# Patient Record
Sex: FEMALE | Race: WHITE | NOT HISPANIC OR LATINO
[De-identification: names, ages, dates, MRNs, and addresses within clinical notes are randomized per-mention and may not be internally consistent; named-entity substitution may affect disease eponyms.]

---

## 2017-09-21 ENCOUNTER — APPOINTMENT (OUTPATIENT)
Dept: ORTHOPEDIC SURGERY | Facility: CLINIC | Age: 67
End: 2017-09-21
Payer: COMMERCIAL

## 2017-09-21 VITALS
BODY MASS INDEX: 28.7 KG/M2 | WEIGHT: 152 LBS | HEART RATE: 78 BPM | SYSTOLIC BLOOD PRESSURE: 145 MMHG | DIASTOLIC BLOOD PRESSURE: 80 MMHG | HEIGHT: 61 IN

## 2017-09-21 VITALS — BODY MASS INDEX: 28.7 KG/M2 | HEIGHT: 61 IN | WEIGHT: 152 LBS

## 2017-09-21 DIAGNOSIS — Z78.9 OTHER SPECIFIED HEALTH STATUS: ICD-10-CM

## 2017-09-21 DIAGNOSIS — Z80.9 FAMILY HISTORY OF MALIGNANT NEOPLASM, UNSPECIFIED: ICD-10-CM

## 2017-09-21 DIAGNOSIS — Z82.61 FAMILY HISTORY OF ARTHRITIS: ICD-10-CM

## 2017-09-21 DIAGNOSIS — Z87.39 PERSONAL HISTORY OF OTHER DISEASES OF THE MUSCULOSKELETAL SYSTEM AND CONNECTIVE TISSUE: ICD-10-CM

## 2017-09-21 PROCEDURE — 73562 X-RAY EXAM OF KNEE 3: CPT | Mod: RT

## 2017-09-21 PROCEDURE — 99205 OFFICE O/P NEW HI 60 MIN: CPT

## 2017-09-21 PROCEDURE — 72170 X-RAY EXAM OF PELVIS: CPT

## 2017-10-05 ENCOUNTER — RX RENEWAL (OUTPATIENT)
Age: 67
End: 2017-10-05

## 2017-10-12 ENCOUNTER — APPOINTMENT (OUTPATIENT)
Dept: VASCULAR SURGERY | Facility: CLINIC | Age: 67
End: 2017-10-12
Payer: COMMERCIAL

## 2017-10-12 VITALS
DIASTOLIC BLOOD PRESSURE: 66 MMHG | HEIGHT: 61 IN | HEART RATE: 63 BPM | TEMPERATURE: 97.7 F | SYSTOLIC BLOOD PRESSURE: 112 MMHG | BODY MASS INDEX: 28.7 KG/M2 | WEIGHT: 152 LBS

## 2017-10-12 PROCEDURE — 99203 OFFICE O/P NEW LOW 30 MIN: CPT | Mod: 25

## 2017-10-12 PROCEDURE — 93970 EXTREMITY STUDY: CPT

## 2017-10-27 ENCOUNTER — APPOINTMENT (OUTPATIENT)
Dept: VASCULAR SURGERY | Facility: CLINIC | Age: 67
End: 2017-10-27

## 2017-11-03 ENCOUNTER — RX RENEWAL (OUTPATIENT)
Age: 67
End: 2017-11-03

## 2017-11-21 ENCOUNTER — OUTPATIENT (OUTPATIENT)
Dept: OUTPATIENT SERVICES | Facility: HOSPITAL | Age: 67
LOS: 1 days | End: 2017-11-21
Payer: COMMERCIAL

## 2017-11-21 VITALS
WEIGHT: 153 LBS | OXYGEN SATURATION: 97 % | RESPIRATION RATE: 16 BRPM | HEART RATE: 76 BPM | SYSTOLIC BLOOD PRESSURE: 102 MMHG | HEIGHT: 60.5 IN | TEMPERATURE: 97 F | DIASTOLIC BLOOD PRESSURE: 62 MMHG

## 2017-11-21 DIAGNOSIS — E03.9 HYPOTHYROIDISM, UNSPECIFIED: ICD-10-CM

## 2017-11-21 DIAGNOSIS — M34.1 CR(E)ST SYNDROME: ICD-10-CM

## 2017-11-21 DIAGNOSIS — Z98.890 OTHER SPECIFIED POSTPROCEDURAL STATES: Chronic | ICD-10-CM

## 2017-11-21 DIAGNOSIS — M17.11 UNILATERAL PRIMARY OSTEOARTHRITIS, RIGHT KNEE: ICD-10-CM

## 2017-11-21 DIAGNOSIS — I83.90 ASYMPTOMATIC VARICOSE VEINS OF UNSPECIFIED LOWER EXTREMITY: ICD-10-CM

## 2017-11-21 DIAGNOSIS — Z92.3 PERSONAL HISTORY OF IRRADIATION: Chronic | ICD-10-CM

## 2017-11-21 LAB
ALBUMIN SERPL ELPH-MCNC: 4.1 G/DL — SIGNIFICANT CHANGE UP (ref 3.3–5)
ALP SERPL-CCNC: 61 U/L — SIGNIFICANT CHANGE UP (ref 40–120)
ALT FLD-CCNC: 12 U/L — SIGNIFICANT CHANGE UP (ref 4–33)
APPEARANCE UR: CLEAR — SIGNIFICANT CHANGE UP
AST SERPL-CCNC: 14 U/L — SIGNIFICANT CHANGE UP (ref 4–32)
BACTERIA # UR AUTO: SIGNIFICANT CHANGE UP
BILIRUB SERPL-MCNC: 0.2 MG/DL — SIGNIFICANT CHANGE UP (ref 0.2–1.2)
BILIRUB UR-MCNC: NEGATIVE — SIGNIFICANT CHANGE UP
BLD GP AB SCN SERPL QL: NEGATIVE — SIGNIFICANT CHANGE UP
BLOOD UR QL VISUAL: NEGATIVE — SIGNIFICANT CHANGE UP
BUN SERPL-MCNC: 21 MG/DL — SIGNIFICANT CHANGE UP (ref 7–23)
CALCIUM SERPL-MCNC: 9.6 MG/DL — SIGNIFICANT CHANGE UP (ref 8.4–10.5)
CHLORIDE SERPL-SCNC: 108 MMOL/L — HIGH (ref 98–107)
CO2 SERPL-SCNC: 23 MMOL/L — SIGNIFICANT CHANGE UP (ref 22–31)
COLOR SPEC: YELLOW — SIGNIFICANT CHANGE UP
CREAT SERPL-MCNC: 0.8 MG/DL — SIGNIFICANT CHANGE UP (ref 0.5–1.3)
GLUCOSE SERPL-MCNC: 72 MG/DL — SIGNIFICANT CHANGE UP (ref 70–99)
GLUCOSE UR-MCNC: NEGATIVE — SIGNIFICANT CHANGE UP
GRAN CASTS # UR COMP ASSIST: SIGNIFICANT CHANGE UP
HBA1C BLD-MCNC: 5.7 % — HIGH (ref 4–5.6)
HCT VFR BLD CALC: 42.4 % — SIGNIFICANT CHANGE UP (ref 34.5–45)
HGB BLD-MCNC: 13 G/DL — SIGNIFICANT CHANGE UP (ref 11.5–15.5)
HYALINE CASTS # UR AUTO: SIGNIFICANT CHANGE UP (ref 0–?)
KETONES UR-MCNC: NEGATIVE — SIGNIFICANT CHANGE UP
LEUKOCYTE ESTERASE UR-ACNC: NEGATIVE — SIGNIFICANT CHANGE UP
MCHC RBC-ENTMCNC: 27.6 PG — SIGNIFICANT CHANGE UP (ref 27–34)
MCHC RBC-ENTMCNC: 30.7 % — LOW (ref 32–36)
MCV RBC AUTO: 90 FL — SIGNIFICANT CHANGE UP (ref 80–100)
MUCOUS THREADS # UR AUTO: SIGNIFICANT CHANGE UP
NITRITE UR-MCNC: NEGATIVE — SIGNIFICANT CHANGE UP
NRBC # FLD: 0 — SIGNIFICANT CHANGE UP
PH UR: 6 — SIGNIFICANT CHANGE UP (ref 4.6–8)
PLATELET # BLD AUTO: 246 K/UL — SIGNIFICANT CHANGE UP (ref 150–400)
PMV BLD: 12.1 FL — SIGNIFICANT CHANGE UP (ref 7–13)
POTASSIUM SERPL-MCNC: 4.1 MMOL/L — SIGNIFICANT CHANGE UP (ref 3.5–5.3)
POTASSIUM SERPL-SCNC: 4.1 MMOL/L — SIGNIFICANT CHANGE UP (ref 3.5–5.3)
PROT SERPL-MCNC: 7 G/DL — SIGNIFICANT CHANGE UP (ref 6–8.3)
PROT UR-MCNC: 20 — SIGNIFICANT CHANGE UP
RBC # BLD: 4.71 M/UL — SIGNIFICANT CHANGE UP (ref 3.8–5.2)
RBC # FLD: 13.4 % — SIGNIFICANT CHANGE UP (ref 10.3–14.5)
RBC CASTS # UR COMP ASSIST: SIGNIFICANT CHANGE UP (ref 0–?)
RH IG SCN BLD-IMP: POSITIVE — SIGNIFICANT CHANGE UP
SODIUM SERPL-SCNC: 145 MMOL/L — SIGNIFICANT CHANGE UP (ref 135–145)
SP GR SPEC: 1.02 — SIGNIFICANT CHANGE UP (ref 1–1.03)
SQUAMOUS # UR AUTO: SIGNIFICANT CHANGE UP
UROBILINOGEN FLD QL: NORMAL E.U. — SIGNIFICANT CHANGE UP (ref 0.1–0.2)
WBC # BLD: 7.66 K/UL — SIGNIFICANT CHANGE UP (ref 3.8–10.5)
WBC # FLD AUTO: 7.66 K/UL — SIGNIFICANT CHANGE UP (ref 3.8–10.5)
WBC UR QL: HIGH (ref 0–?)

## 2017-11-21 PROCEDURE — 93010 ELECTROCARDIOGRAM REPORT: CPT

## 2017-11-21 RX ORDER — SODIUM CHLORIDE 9 MG/ML
3 INJECTION INTRAMUSCULAR; INTRAVENOUS; SUBCUTANEOUS EVERY 8 HOURS
Qty: 0 | Refills: 0 | Status: DISCONTINUED | OUTPATIENT
Start: 2017-12-04 | End: 2017-12-06

## 2017-11-21 RX ORDER — TRAMADOL HYDROCHLORIDE 50 MG/1
50 TABLET ORAL ONCE
Qty: 0 | Refills: 0 | Status: DISCONTINUED | OUTPATIENT
Start: 2017-12-04 | End: 2017-12-04

## 2017-11-21 RX ORDER — SODIUM CHLORIDE 9 MG/ML
1000 INJECTION, SOLUTION INTRAVENOUS
Qty: 0 | Refills: 0 | Status: DISCONTINUED | OUTPATIENT
Start: 2017-12-04 | End: 2017-12-04

## 2017-11-21 RX ORDER — PANTOPRAZOLE SODIUM 20 MG/1
40 TABLET, DELAYED RELEASE ORAL ONCE
Qty: 0 | Refills: 0 | Status: COMPLETED | OUTPATIENT
Start: 2017-12-04 | End: 2017-12-04

## 2017-11-21 RX ORDER — ACETAMINOPHEN 500 MG
975 TABLET ORAL ONCE
Qty: 0 | Refills: 0 | Status: COMPLETED | OUTPATIENT
Start: 2017-12-04 | End: 2017-12-04

## 2017-11-21 RX ORDER — GABAPENTIN 400 MG/1
300 CAPSULE ORAL ONCE
Qty: 0 | Refills: 0 | Status: COMPLETED | OUTPATIENT
Start: 2017-12-04 | End: 2017-12-04

## 2017-11-21 NOTE — H&P PST ADULT - PROBLEM SELECTOR PLAN 3
Pt states she received clearance from her Rheumatologist Dr. Goldberg secondary to CREST syndrome   Copy of clearance requested  Pt also saw a Pulmonologist preop. States CXR and PFT's were performed  Copy of clearance and all reports requested

## 2017-11-21 NOTE — H&P PST ADULT - NEGATIVE ENMT SYMPTOMS
no gum bleeding/no hearing difficulty/no ear pain/no tinnitus/no abnormal taste sensation/no vertigo/no dysphagia/no nose bleeds

## 2017-11-21 NOTE — H&P PST ADULT - GENERAL GENITOURINARY SYMPTOMS
recently treated for bladder infection earlier this month/bladder infections flank pain L/recently treated for bladder infection earlier this month/bladder infections

## 2017-11-21 NOTE — H&P PST ADULT - OTHER CARE PROVIDERS
Pulmonary Dr. Clayton [phone] Rheumatology Dr. Goldberg [phone] Pulmonary Dr. Clayton [phone] Rheumatology Dr. Goldberg [phone] Vascular Dr. Briggs [phone]

## 2017-11-21 NOTE — H&P PST ADULT - NEGATIVE CARDIOVASCULAR SYMPTOMS
no orthopnea/no paroxysmal nocturnal dyspnea/no chest pain/no palpitations/no dyspnea on exertion/no claudication

## 2017-11-21 NOTE — H&P PST ADULT - NEUROLOGICAL DETAILS
alert and oriented x 3/responds to verbal commands/normal strength/responds to pain/sensation intact/no spontaneous movement

## 2017-11-21 NOTE — H&P PST ADULT - PSH
H/O lumpectomy  right breast in 2005 H/O lumpectomy  of right breast in 2005 with removal of 2 lymph nodes  S/P radiation therapy  for 9 weeks

## 2017-11-21 NOTE — H&P PST ADULT - NEGATIVE NEUROLOGICAL SYMPTOMS
no weakness/no generalized seizures/no focal seizures/no syncope/no paresthesias/no loss of consciousness/no hemiparesis/no headache/no tremors/no facial palsy/no loss of sensation/no transient paralysis/no confusion

## 2017-11-21 NOTE — H&P PST ADULT - PROBLEM SELECTOR PLAN 4
Pt states she was instructed to see a vascular surgeon secondary to her h/o varicose veins  Seen by Dr. Briggs for clearance   Copy of Note requested

## 2017-11-21 NOTE — H&P PST ADULT - NEGATIVE GENERAL SYMPTOMS
no weight loss/no chills/no polyuria/no fatigue/no weight gain/no polydipsia/no fever/no sweating/no polyphagia

## 2017-11-21 NOTE — H&P PST ADULT - PROBLEM SELECTOR PLAN 1
Scheduled for Right Total Knee Replacement on 12/4/2017.   Pre op instructions given , pt verbalized understanding   Chlorhexidine wash provided  Pt instructed to take her prescribed GI prophylaxis Omeprazole AM of surgery with a sip of water   She has an appt on 11/27 for medical evaluation  Evaluation note requested from PCP Dr. Amico  ECHO report requested

## 2017-11-21 NOTE — H&P PST ADULT - PMH
Bladder infection, chronic    Breast cancer  right breast CA in 2005  Chronic sinusitis    CREST (calcinosis, Raynaud's phenomenon, esophageal dysfunction, sclerodactyly, telangiectasia)    Hypothyroid    Lymphedema  right arm  Primary osteoarthritis of right knee    Scleroderma    Varicose veins Bladder infection, chronic    Breast cancer  right breast CA in 2005  Chronic sinusitis    CREST (calcinosis, Raynaud's phenomenon, esophageal dysfunction, sclerodactyly, telangiectasia)  affecting lungs, bladder, kidney's, esophagus, skin, eyes and ears  Hypothyroid    Lymphedema  right arm  Primary osteoarthritis of right knee    Scleroderma    Varicose veins

## 2017-11-21 NOTE — H&P PST ADULT - NEGATIVE GENERAL GENITOURINARY SYMPTOMS
no hematuria/no dysuria/no flank pain L/no incontinence/no flank pain R no incontinence/no dysuria/no hematuria/no flank pain R

## 2017-11-21 NOTE — H&P PST ADULT - HISTORY OF PRESENT ILLNESS
66 y/o female presents to PST for preoperative evaluation with dx of unilateral primary osteoarthritis right knee. Pt reports right knee pain started in December 2016. c/o chronic sharp throbbing pain that is made worse with activity limiting her ability to perform some ADLs. Scheduled for Right Total Knee Replacement on 12/4/2017. 68 y/o female presents to PST for preoperative evaluation with dx of unilateral primary osteoarthritis right knee. Pt reports right knee pain started in December 2016. c/o chronic sharp throbbing pain that is made worse with activity limiting her ability to perform some ADLs. Pt also states right knee pain keeps her up at night and despite conservative measures, right knee pain persists. Scheduled for Right Total Knee Replacement on 12/4/2017. 66 y/o female presents to PST for preoperative evaluation with dx of unilateral primary osteoarthritis right knee. Pt reports right knee pain started in December 2016. c/o chronic sharp throbbing pain that is made worse with activity limiting her ability to perform some ADLs. Pt also states right knee pain keeps her up at night and despite conservative measures, pain persists. Scheduled for Right Total Knee Replacement on 12/4/2017.

## 2017-11-21 NOTE — H&P PST ADULT - NEGATIVE GASTROINTESTINAL SYMPTOMS
no nausea/no abdominal pain/no diarrhea/no vomiting/no melena/no change in bowel habits/no constipation

## 2017-11-21 NOTE — H&P PST ADULT - NSANTHOSAYNRD_GEN_A_CORE
No. ANDRAE screening performed.  STOP BANG Legend: 0-2 = LOW Risk; 3-4 = INTERMEDIATE Risk; 5-8 = HIGH Risk

## 2017-11-21 NOTE — H&P PST ADULT - FAMILY HISTORY
Grandparent  Still living? Unknown  Family history of breast cancer, Age at diagnosis: Age Unknown  Family history of pancreatic cancer, Age at diagnosis: Age Unknown     Mother  Still living? Unknown  Family history of heart attack, Age at diagnosis: Age Unknown

## 2017-11-21 NOTE — H&P PST ADULT - LYMPHATIC
anterior cervical L/supraclavicular L/posterior cervical R/supraclavicular R/anterior cervical R/posterior cervical L

## 2017-11-21 NOTE — H&P PST ADULT - NEGATIVE OPHTHALMOLOGIC SYMPTOMS
no pain L/no photophobia/no loss of vision L/no loss of vision R/no diplopia/no blurred vision L/no blurred vision R/no pain R

## 2017-11-22 LAB
SPECIMEN SOURCE: SIGNIFICANT CHANGE UP
SPECIMEN SOURCE: SIGNIFICANT CHANGE UP

## 2017-11-23 LAB
BACTERIA NPH CULT: SIGNIFICANT CHANGE UP
BACTERIA UR CULT: SIGNIFICANT CHANGE UP

## 2017-11-27 ENCOUNTER — RX RENEWAL (OUTPATIENT)
Age: 67
End: 2017-11-27

## 2017-12-01 NOTE — ASU PATIENT PROFILE, ADULT - PSH
H/O lumpectomy  of right breast in 2005 with removal of 2 lymph nodes  S/P radiation therapy  for 9 weeks

## 2017-12-01 NOTE — ASU PATIENT PROFILE, ADULT - PMH
Bladder infection, chronic    Breast cancer  right breast CA in 2005  Chronic sinusitis    CREST (calcinosis, Raynaud's phenomenon, esophageal dysfunction, sclerodactyly, telangiectasia)  affecting lungs, bladder, kidney's, esophagus, skin, eyes and ears  Hypothyroid    Lymphedema  right arm  Primary osteoarthritis of right knee    Scleroderma    Varicose veins

## 2017-12-03 ENCOUNTER — FORM ENCOUNTER (OUTPATIENT)
Age: 67
End: 2017-12-03

## 2017-12-04 ENCOUNTER — APPOINTMENT (OUTPATIENT)
Dept: ORTHOPEDIC SURGERY | Facility: HOSPITAL | Age: 67
End: 2017-12-04

## 2017-12-04 ENCOUNTER — RESULT REVIEW (OUTPATIENT)
Age: 67
End: 2017-12-04

## 2017-12-04 ENCOUNTER — INPATIENT (INPATIENT)
Facility: HOSPITAL | Age: 67
LOS: 1 days | Discharge: HOME HEALTH | End: 2017-12-06
Attending: ORTHOPAEDIC SURGERY | Admitting: ORTHOPAEDIC SURGERY
Payer: COMMERCIAL

## 2017-12-04 VITALS
RESPIRATION RATE: 16 BRPM | WEIGHT: 153 LBS | OXYGEN SATURATION: 98 % | SYSTOLIC BLOOD PRESSURE: 131 MMHG | HEART RATE: 66 BPM | HEIGHT: 60.5 IN | TEMPERATURE: 97 F | DIASTOLIC BLOOD PRESSURE: 90 MMHG

## 2017-12-04 DIAGNOSIS — M17.11 UNILATERAL PRIMARY OSTEOARTHRITIS, RIGHT KNEE: ICD-10-CM

## 2017-12-04 DIAGNOSIS — Z98.890 OTHER SPECIFIED POSTPROCEDURAL STATES: Chronic | ICD-10-CM

## 2017-12-04 DIAGNOSIS — Z92.3 PERSONAL HISTORY OF IRRADIATION: Chronic | ICD-10-CM

## 2017-12-04 LAB
BUN SERPL-MCNC: 19 MG/DL — SIGNIFICANT CHANGE UP (ref 7–23)
CALCIUM SERPL-MCNC: 9.2 MG/DL — SIGNIFICANT CHANGE UP (ref 8.4–10.5)
CHLORIDE SERPL-SCNC: 108 MMOL/L — HIGH (ref 98–107)
CO2 SERPL-SCNC: 22 MMOL/L — SIGNIFICANT CHANGE UP (ref 22–31)
CREAT SERPL-MCNC: 0.76 MG/DL — SIGNIFICANT CHANGE UP (ref 0.5–1.3)
GLUCOSE SERPL-MCNC: 136 MG/DL — HIGH (ref 70–99)
HCT VFR BLD CALC: 36.9 % — SIGNIFICANT CHANGE UP (ref 34.5–45)
HGB BLD-MCNC: 11.3 G/DL — LOW (ref 11.5–15.5)
MCHC RBC-ENTMCNC: 27.4 PG — SIGNIFICANT CHANGE UP (ref 27–34)
MCHC RBC-ENTMCNC: 30.6 % — LOW (ref 32–36)
MCV RBC AUTO: 89.6 FL — SIGNIFICANT CHANGE UP (ref 80–100)
NRBC # FLD: 0 — SIGNIFICANT CHANGE UP
PLATELET # BLD AUTO: 214 K/UL — SIGNIFICANT CHANGE UP (ref 150–400)
PMV BLD: 12.2 FL — SIGNIFICANT CHANGE UP (ref 7–13)
POTASSIUM SERPL-MCNC: 4.2 MMOL/L — SIGNIFICANT CHANGE UP (ref 3.5–5.3)
POTASSIUM SERPL-SCNC: 4.2 MMOL/L — SIGNIFICANT CHANGE UP (ref 3.5–5.3)
RBC # BLD: 4.12 M/UL — SIGNIFICANT CHANGE UP (ref 3.8–5.2)
RBC # FLD: 13.3 % — SIGNIFICANT CHANGE UP (ref 10.3–14.5)
RH IG SCN BLD-IMP: POSITIVE — SIGNIFICANT CHANGE UP
SODIUM SERPL-SCNC: 143 MMOL/L — SIGNIFICANT CHANGE UP (ref 135–145)
WBC # BLD: 7.64 K/UL — SIGNIFICANT CHANGE UP (ref 3.8–10.5)
WBC # FLD AUTO: 7.64 K/UL — SIGNIFICANT CHANGE UP (ref 3.8–10.5)

## 2017-12-04 PROCEDURE — 73560 X-RAY EXAM OF KNEE 1 OR 2: CPT | Mod: 26,RT

## 2017-12-04 PROCEDURE — 88305 TISSUE EXAM BY PATHOLOGIST: CPT | Mod: 26

## 2017-12-04 PROCEDURE — 27447 TOTAL KNEE ARTHROPLASTY: CPT | Mod: RT

## 2017-12-04 PROCEDURE — 88311 DECALCIFY TISSUE: CPT | Mod: 26

## 2017-12-04 RX ORDER — OXYCODONE HYDROCHLORIDE 5 MG/1
10 TABLET ORAL EVERY 4 HOURS
Qty: 0 | Refills: 0 | Status: DISCONTINUED | OUTPATIENT
Start: 2017-12-04 | End: 2017-12-06

## 2017-12-04 RX ORDER — LORATADINE 10 MG/1
10 TABLET ORAL DAILY
Qty: 0 | Refills: 0 | Status: DISCONTINUED | OUTPATIENT
Start: 2017-12-04 | End: 2017-12-06

## 2017-12-04 RX ORDER — SODIUM CHLORIDE 9 MG/ML
1000 INJECTION INTRAMUSCULAR; INTRAVENOUS; SUBCUTANEOUS ONCE
Qty: 0 | Refills: 0 | Status: COMPLETED | OUTPATIENT
Start: 2017-12-04 | End: 2017-12-04

## 2017-12-04 RX ORDER — FLUTICASONE PROPIONATE 50 MCG
2 SPRAY, SUSPENSION NASAL DAILY
Qty: 0 | Refills: 0 | Status: DISCONTINUED | OUTPATIENT
Start: 2017-12-04 | End: 2017-12-06

## 2017-12-04 RX ORDER — KETOROLAC TROMETHAMINE 30 MG/ML
15 SYRINGE (ML) INJECTION EVERY 6 HOURS
Qty: 0 | Refills: 0 | Status: DISCONTINUED | OUTPATIENT
Start: 2017-12-04 | End: 2017-12-04

## 2017-12-04 RX ORDER — PANTOPRAZOLE SODIUM 20 MG/1
40 TABLET, DELAYED RELEASE ORAL DAILY
Qty: 0 | Refills: 0 | Status: DISCONTINUED | OUTPATIENT
Start: 2017-12-04 | End: 2017-12-06

## 2017-12-04 RX ORDER — DOCUSATE SODIUM 100 MG
100 CAPSULE ORAL THREE TIMES A DAY
Qty: 0 | Refills: 0 | Status: DISCONTINUED | OUTPATIENT
Start: 2017-12-04 | End: 2017-12-06

## 2017-12-04 RX ORDER — METOCLOPRAMIDE HCL 10 MG
10 TABLET ORAL ONCE
Qty: 0 | Refills: 0 | Status: DISCONTINUED | OUTPATIENT
Start: 2017-12-04 | End: 2017-12-04

## 2017-12-04 RX ORDER — CEFAZOLIN SODIUM 1 G
2000 VIAL (EA) INJECTION EVERY 8 HOURS
Qty: 0 | Refills: 0 | Status: COMPLETED | OUTPATIENT
Start: 2017-12-04 | End: 2017-12-05

## 2017-12-04 RX ORDER — KETOROLAC TROMETHAMINE 30 MG/ML
15 SYRINGE (ML) INJECTION EVERY 6 HOURS
Qty: 0 | Refills: 0 | Status: DISCONTINUED | OUTPATIENT
Start: 2017-12-04 | End: 2017-12-05

## 2017-12-04 RX ORDER — ACETAMINOPHEN 500 MG
650 TABLET ORAL EVERY 6 HOURS
Qty: 0 | Refills: 0 | Status: DISCONTINUED | OUTPATIENT
Start: 2017-12-04 | End: 2017-12-06

## 2017-12-04 RX ORDER — POLYETHYLENE GLYCOL 3350 17 G/17G
17 POWDER, FOR SOLUTION ORAL DAILY
Qty: 0 | Refills: 0 | Status: DISCONTINUED | OUTPATIENT
Start: 2017-12-04 | End: 2017-12-06

## 2017-12-04 RX ORDER — SODIUM CHLORIDE 9 MG/ML
1000 INJECTION INTRAMUSCULAR; INTRAVENOUS; SUBCUTANEOUS ONCE
Qty: 0 | Refills: 0 | Status: COMPLETED | OUTPATIENT
Start: 2017-12-05 | End: 2017-12-05

## 2017-12-04 RX ORDER — CELECOXIB 200 MG/1
200 CAPSULE ORAL
Qty: 0 | Refills: 0 | Status: DISCONTINUED | OUTPATIENT
Start: 2017-12-06 | End: 2017-12-06

## 2017-12-04 RX ORDER — ONDANSETRON 8 MG/1
4 TABLET, FILM COATED ORAL EVERY 6 HOURS
Qty: 0 | Refills: 0 | Status: DISCONTINUED | OUTPATIENT
Start: 2017-12-04 | End: 2017-12-06

## 2017-12-04 RX ORDER — FENTANYL CITRATE 50 UG/ML
25 INJECTION INTRAVENOUS
Qty: 0 | Refills: 0 | Status: DISCONTINUED | OUTPATIENT
Start: 2017-12-04 | End: 2017-12-04

## 2017-12-04 RX ORDER — MORPHINE SULFATE 50 MG/1
4 CAPSULE, EXTENDED RELEASE ORAL ONCE
Qty: 0 | Refills: 0 | Status: DISCONTINUED | OUTPATIENT
Start: 2017-12-04 | End: 2017-12-04

## 2017-12-04 RX ORDER — SENNA PLUS 8.6 MG/1
2 TABLET ORAL AT BEDTIME
Qty: 0 | Refills: 0 | Status: DISCONTINUED | OUTPATIENT
Start: 2017-12-04 | End: 2017-12-06

## 2017-12-04 RX ORDER — PHENAZOPYRIDINE HCL 100 MG
100 TABLET ORAL DAILY
Qty: 0 | Refills: 0 | Status: DISCONTINUED | OUTPATIENT
Start: 2017-12-04 | End: 2017-12-06

## 2017-12-04 RX ORDER — DEXAMETHASONE 0.5 MG/5ML
10 ELIXIR ORAL ONCE
Qty: 0 | Refills: 0 | Status: COMPLETED | OUTPATIENT
Start: 2017-12-05 | End: 2017-12-05

## 2017-12-04 RX ORDER — SODIUM CHLORIDE 9 MG/ML
1000 INJECTION, SOLUTION INTRAVENOUS
Qty: 0 | Refills: 0 | Status: DISCONTINUED | OUTPATIENT
Start: 2017-12-04 | End: 2017-12-05

## 2017-12-04 RX ORDER — TRAMADOL HYDROCHLORIDE 50 MG/1
50 TABLET ORAL EVERY 8 HOURS
Qty: 0 | Refills: 0 | Status: DISCONTINUED | OUTPATIENT
Start: 2017-12-04 | End: 2017-12-04

## 2017-12-04 RX ORDER — TRAMADOL HYDROCHLORIDE 50 MG/1
50 TABLET ORAL EVERY 8 HOURS
Qty: 0 | Refills: 0 | Status: DISCONTINUED | OUTPATIENT
Start: 2017-12-04 | End: 2017-12-06

## 2017-12-04 RX ORDER — ASPIRIN/CALCIUM CARB/MAGNESIUM 324 MG
325 TABLET ORAL
Qty: 0 | Refills: 0 | Status: DISCONTINUED | OUTPATIENT
Start: 2017-12-04 | End: 2017-12-06

## 2017-12-04 RX ORDER — FENTANYL CITRATE 50 UG/ML
50 INJECTION INTRAVENOUS
Qty: 0 | Refills: 0 | Status: DISCONTINUED | OUTPATIENT
Start: 2017-12-04 | End: 2017-12-04

## 2017-12-04 RX ORDER — OXYCODONE HYDROCHLORIDE 5 MG/1
5 TABLET ORAL EVERY 4 HOURS
Qty: 0 | Refills: 0 | Status: DISCONTINUED | OUTPATIENT
Start: 2017-12-04 | End: 2017-12-06

## 2017-12-04 RX ORDER — GABAPENTIN 400 MG/1
100 CAPSULE ORAL EVERY 8 HOURS
Qty: 0 | Refills: 0 | Status: DISCONTINUED | OUTPATIENT
Start: 2017-12-04 | End: 2017-12-06

## 2017-12-04 RX ORDER — NIFEDIPINE 30 MG
30 TABLET, EXTENDED RELEASE 24 HR ORAL DAILY
Qty: 0 | Refills: 0 | Status: DISCONTINUED | OUTPATIENT
Start: 2017-12-04 | End: 2017-12-06

## 2017-12-04 RX ORDER — LEVOTHYROXINE SODIUM 125 MCG
125 TABLET ORAL DAILY
Qty: 0 | Refills: 0 | Status: DISCONTINUED | OUTPATIENT
Start: 2017-12-04 | End: 2017-12-06

## 2017-12-04 RX ORDER — MORPHINE SULFATE 50 MG/1
2 CAPSULE, EXTENDED RELEASE ORAL EVERY 4 HOURS
Qty: 0 | Refills: 0 | Status: DISCONTINUED | OUTPATIENT
Start: 2017-12-04 | End: 2017-12-06

## 2017-12-04 RX ADMIN — TRAMADOL HYDROCHLORIDE 50 MILLIGRAM(S): 50 TABLET ORAL at 06:46

## 2017-12-04 RX ADMIN — SODIUM CHLORIDE 1000 MILLILITER(S): 9 INJECTION INTRAMUSCULAR; INTRAVENOUS; SUBCUTANEOUS at 13:56

## 2017-12-04 RX ADMIN — TRAMADOL HYDROCHLORIDE 50 MILLIGRAM(S): 50 TABLET ORAL at 23:00

## 2017-12-04 RX ADMIN — PANTOPRAZOLE SODIUM 40 MILLIGRAM(S): 20 TABLET, DELAYED RELEASE ORAL at 12:37

## 2017-12-04 RX ADMIN — GABAPENTIN 100 MILLIGRAM(S): 400 CAPSULE ORAL at 21:55

## 2017-12-04 RX ADMIN — Medication 650 MILLIGRAM(S): at 12:38

## 2017-12-04 RX ADMIN — Medication 2 SPRAY(S): at 16:01

## 2017-12-04 RX ADMIN — Medication 100 MILLIGRAM(S): at 21:55

## 2017-12-04 RX ADMIN — FENTANYL CITRATE 50 MICROGRAM(S): 50 INJECTION INTRAVENOUS at 12:19

## 2017-12-04 RX ADMIN — Medication 325 MILLIGRAM(S): at 18:57

## 2017-12-04 RX ADMIN — GABAPENTIN 100 MILLIGRAM(S): 400 CAPSULE ORAL at 14:49

## 2017-12-04 RX ADMIN — Medication 975 MILLIGRAM(S): at 06:45

## 2017-12-04 RX ADMIN — FENTANYL CITRATE 50 MICROGRAM(S): 50 INJECTION INTRAVENOUS at 11:45

## 2017-12-04 RX ADMIN — Medication 100 MILLIGRAM(S): at 18:34

## 2017-12-04 RX ADMIN — OXYCODONE HYDROCHLORIDE 10 MILLIGRAM(S): 5 TABLET ORAL at 22:35

## 2017-12-04 RX ADMIN — Medication 650 MILLIGRAM(S): at 18:57

## 2017-12-04 RX ADMIN — SENNA PLUS 2 TABLET(S): 8.6 TABLET ORAL at 21:55

## 2017-12-04 RX ADMIN — LORATADINE 10 MILLIGRAM(S): 10 TABLET ORAL at 16:01

## 2017-12-04 RX ADMIN — OXYCODONE HYDROCHLORIDE 10 MILLIGRAM(S): 5 TABLET ORAL at 22:03

## 2017-12-04 RX ADMIN — SODIUM CHLORIDE 150 MILLILITER(S): 9 INJECTION, SOLUTION INTRAVENOUS at 13:57

## 2017-12-04 RX ADMIN — OXYCODONE HYDROCHLORIDE 10 MILLIGRAM(S): 5 TABLET ORAL at 15:02

## 2017-12-04 RX ADMIN — Medication 15 MILLIGRAM(S): at 18:57

## 2017-12-04 RX ADMIN — GABAPENTIN 300 MILLIGRAM(S): 400 CAPSULE ORAL at 06:46

## 2017-12-04 RX ADMIN — PANTOPRAZOLE SODIUM 40 MILLIGRAM(S): 20 TABLET, DELAYED RELEASE ORAL at 06:45

## 2017-12-04 RX ADMIN — OXYCODONE HYDROCHLORIDE 10 MILLIGRAM(S): 5 TABLET ORAL at 14:02

## 2017-12-04 RX ADMIN — SODIUM CHLORIDE 3 MILLILITER(S): 9 INJECTION INTRAMUSCULAR; INTRAVENOUS; SUBCUTANEOUS at 13:51

## 2017-12-04 RX ADMIN — SODIUM CHLORIDE 150 MILLILITER(S): 9 INJECTION, SOLUTION INTRAVENOUS at 11:39

## 2017-12-04 RX ADMIN — TRAMADOL HYDROCHLORIDE 50 MILLIGRAM(S): 50 TABLET ORAL at 21:55

## 2017-12-04 NOTE — BRIEF OPERATIVE NOTE - PROCEDURE
<<-----Click on this checkbox to enter Procedure Arthroplasty of right knee  12/04/2017    Active  RACHAEL

## 2017-12-04 NOTE — PHYSICAL THERAPY INITIAL EVALUATION ADULT - RANGE OF MOTION EXAMINATION, REHAB EVAL
no ROM deficits were identified/right knee flexion to 45 degrees and right knee extension -10 degrees/deficits as listed below

## 2017-12-04 NOTE — ASU PREOP CHECKLIST - SELECT TESTS ORDERED
Type and Screen/EKG/CXR/BMP/CBC/PT/PTT/Urinalysis/INR/Type and Cross/urine culture, nasal swab,pulmonary eval,echo,stress test

## 2017-12-05 ENCOUNTER — TRANSCRIPTION ENCOUNTER (OUTPATIENT)
Age: 67
End: 2017-12-05

## 2017-12-05 DIAGNOSIS — E03.9 HYPOTHYROIDISM, UNSPECIFIED: ICD-10-CM

## 2017-12-05 DIAGNOSIS — D50.0 IRON DEFICIENCY ANEMIA SECONDARY TO BLOOD LOSS (CHRONIC): ICD-10-CM

## 2017-12-05 DIAGNOSIS — C50.919 MALIGNANT NEOPLASM OF UNSPECIFIED SITE OF UNSPECIFIED FEMALE BREAST: ICD-10-CM

## 2017-12-05 DIAGNOSIS — Z96.651 PRESENCE OF RIGHT ARTIFICIAL KNEE JOINT: ICD-10-CM

## 2017-12-05 DIAGNOSIS — D72.829 ELEVATED WHITE BLOOD CELL COUNT, UNSPECIFIED: ICD-10-CM

## 2017-12-05 LAB
BUN SERPL-MCNC: 15 MG/DL — SIGNIFICANT CHANGE UP (ref 7–23)
CALCIUM SERPL-MCNC: 8 MG/DL — LOW (ref 8.4–10.5)
CHLORIDE SERPL-SCNC: 109 MMOL/L — HIGH (ref 98–107)
CO2 SERPL-SCNC: 21 MMOL/L — LOW (ref 22–31)
CREAT SERPL-MCNC: 0.74 MG/DL — SIGNIFICANT CHANGE UP (ref 0.5–1.3)
GLUCOSE SERPL-MCNC: 107 MG/DL — HIGH (ref 70–99)
HCT VFR BLD CALC: 28.7 % — LOW (ref 34.5–45)
HGB BLD-MCNC: 9.2 G/DL — LOW (ref 11.5–15.5)
MCHC RBC-ENTMCNC: 29 PG — SIGNIFICANT CHANGE UP (ref 27–34)
MCHC RBC-ENTMCNC: 32.1 % — SIGNIFICANT CHANGE UP (ref 32–36)
MCV RBC AUTO: 90.5 FL — SIGNIFICANT CHANGE UP (ref 80–100)
NRBC # FLD: 0 — SIGNIFICANT CHANGE UP
PLATELET # BLD AUTO: 171 K/UL — SIGNIFICANT CHANGE UP (ref 150–400)
PMV BLD: 12.4 FL — SIGNIFICANT CHANGE UP (ref 7–13)
POTASSIUM SERPL-MCNC: 4.4 MMOL/L — SIGNIFICANT CHANGE UP (ref 3.5–5.3)
POTASSIUM SERPL-SCNC: 4.4 MMOL/L — SIGNIFICANT CHANGE UP (ref 3.5–5.3)
RBC # BLD: 3.17 M/UL — LOW (ref 3.8–5.2)
RBC # FLD: 13.3 % — SIGNIFICANT CHANGE UP (ref 10.3–14.5)
SODIUM SERPL-SCNC: 139 MMOL/L — SIGNIFICANT CHANGE UP (ref 135–145)
WBC # BLD: 11.61 K/UL — HIGH (ref 3.8–10.5)
WBC # FLD AUTO: 11.61 K/UL — HIGH (ref 3.8–10.5)

## 2017-12-05 PROCEDURE — 99223 1ST HOSP IP/OBS HIGH 75: CPT

## 2017-12-05 RX ORDER — GLYCERIN ADULT
1 SUPPOSITORY, RECTAL RECTAL ONCE
Qty: 0 | Refills: 0 | Status: COMPLETED | OUTPATIENT
Start: 2017-12-05 | End: 2017-12-05

## 2017-12-05 RX ADMIN — Medication 125 MICROGRAM(S): at 06:05

## 2017-12-05 RX ADMIN — GABAPENTIN 100 MILLIGRAM(S): 400 CAPSULE ORAL at 23:15

## 2017-12-05 RX ADMIN — Medication 2 SPRAY(S): at 07:01

## 2017-12-05 RX ADMIN — Medication 100 MILLIGRAM(S): at 13:40

## 2017-12-05 RX ADMIN — Medication 15 MILLIGRAM(S): at 00:54

## 2017-12-05 RX ADMIN — Medication 325 MILLIGRAM(S): at 07:00

## 2017-12-05 RX ADMIN — Medication 30 MILLIGRAM(S): at 12:15

## 2017-12-05 RX ADMIN — GABAPENTIN 100 MILLIGRAM(S): 400 CAPSULE ORAL at 13:40

## 2017-12-05 RX ADMIN — SODIUM CHLORIDE 3 MILLILITER(S): 9 INJECTION INTRAMUSCULAR; INTRAVENOUS; SUBCUTANEOUS at 13:38

## 2017-12-05 RX ADMIN — Medication 102 MILLIGRAM(S): at 07:02

## 2017-12-05 RX ADMIN — OXYCODONE HYDROCHLORIDE 10 MILLIGRAM(S): 5 TABLET ORAL at 07:30

## 2017-12-05 RX ADMIN — Medication 325 MILLIGRAM(S): at 18:09

## 2017-12-05 RX ADMIN — Medication 15 MILLIGRAM(S): at 07:02

## 2017-12-05 RX ADMIN — Medication 100 MILLIGRAM(S): at 23:16

## 2017-12-05 RX ADMIN — SENNA PLUS 2 TABLET(S): 8.6 TABLET ORAL at 23:16

## 2017-12-05 RX ADMIN — Medication 15 MILLIGRAM(S): at 01:31

## 2017-12-05 RX ADMIN — OXYCODONE HYDROCHLORIDE 10 MILLIGRAM(S): 5 TABLET ORAL at 07:00

## 2017-12-05 RX ADMIN — GABAPENTIN 100 MILLIGRAM(S): 400 CAPSULE ORAL at 07:00

## 2017-12-05 RX ADMIN — Medication 100 MILLIGRAM(S): at 07:01

## 2017-12-05 RX ADMIN — SODIUM CHLORIDE 150 MILLILITER(S): 9 INJECTION, SOLUTION INTRAVENOUS at 12:11

## 2017-12-05 RX ADMIN — OXYCODONE HYDROCHLORIDE 10 MILLIGRAM(S): 5 TABLET ORAL at 14:22

## 2017-12-05 RX ADMIN — TRAMADOL HYDROCHLORIDE 50 MILLIGRAM(S): 50 TABLET ORAL at 23:15

## 2017-12-05 RX ADMIN — LORATADINE 10 MILLIGRAM(S): 10 TABLET ORAL at 12:13

## 2017-12-05 RX ADMIN — Medication 650 MILLIGRAM(S): at 12:12

## 2017-12-05 RX ADMIN — Medication 650 MILLIGRAM(S): at 07:00

## 2017-12-05 RX ADMIN — TRAMADOL HYDROCHLORIDE 50 MILLIGRAM(S): 50 TABLET ORAL at 07:00

## 2017-12-05 RX ADMIN — Medication 15 MILLIGRAM(S): at 06:59

## 2017-12-05 RX ADMIN — Medication 15 MILLIGRAM(S): at 12:12

## 2017-12-05 RX ADMIN — SODIUM CHLORIDE 1000 MILLILITER(S): 9 INJECTION INTRAMUSCULAR; INTRAVENOUS; SUBCUTANEOUS at 06:01

## 2017-12-05 RX ADMIN — Medication 650 MILLIGRAM(S): at 00:54

## 2017-12-05 RX ADMIN — Medication 650 MILLIGRAM(S): at 18:09

## 2017-12-05 RX ADMIN — Medication 1 SUPPOSITORY(S): at 15:23

## 2017-12-05 RX ADMIN — POLYETHYLENE GLYCOL 3350 17 GRAM(S): 17 POWDER, FOR SOLUTION ORAL at 12:13

## 2017-12-05 RX ADMIN — TRAMADOL HYDROCHLORIDE 50 MILLIGRAM(S): 50 TABLET ORAL at 07:03

## 2017-12-05 RX ADMIN — SODIUM CHLORIDE 3 MILLILITER(S): 9 INJECTION INTRAMUSCULAR; INTRAVENOUS; SUBCUTANEOUS at 21:08

## 2017-12-05 RX ADMIN — TRAMADOL HYDROCHLORIDE 50 MILLIGRAM(S): 50 TABLET ORAL at 13:40

## 2017-12-05 RX ADMIN — PANTOPRAZOLE SODIUM 40 MILLIGRAM(S): 20 TABLET, DELAYED RELEASE ORAL at 12:13

## 2017-12-05 RX ADMIN — OXYCODONE HYDROCHLORIDE 10 MILLIGRAM(S): 5 TABLET ORAL at 19:00

## 2017-12-05 RX ADMIN — OXYCODONE HYDROCHLORIDE 10 MILLIGRAM(S): 5 TABLET ORAL at 13:41

## 2017-12-05 RX ADMIN — OXYCODONE HYDROCHLORIDE 10 MILLIGRAM(S): 5 TABLET ORAL at 18:10

## 2017-12-05 RX ADMIN — Medication 100 MILLIGRAM(S): at 00:54

## 2017-12-05 RX ADMIN — Medication 650 MILLIGRAM(S): at 07:02

## 2017-12-05 RX ADMIN — Medication 650 MILLIGRAM(S): at 01:31

## 2017-12-05 RX ADMIN — Medication 650 MILLIGRAM(S): at 23:16

## 2017-12-05 NOTE — OCCUPATIONAL THERAPY INITIAL EVALUATION ADULT - PERTINENT HX OF CURRENT PROBLEM, REHAB EVAL
66 y/o female with dx of unilateral primary osteoarthritis right knee. Pt reports right knee pain started in December 2016. C/o chronic sharp throbbing pain that is made worse with activity limiting her ability to perform some ADLs. Pt also states right knee pain keeps her up at night and despite conservative measures, pain persists. Pt now s/p Right TKA.

## 2017-12-05 NOTE — DISCHARGE NOTE ADULT - NS AS DC FOLLOWUP STROKE INST
knee, exercise worksheet, dilaudid, tramadol, ecotrin, gabapentin, colace/Influenza vaccination ([facility] Pub Date: August 19, 2014)/Smoking Cessation knee, exercise worksheet, dilaudid, tramadol, ecotrin, gabapentin, colace

## 2017-12-05 NOTE — DISCHARGE NOTE ADULT - INSTRUCTIONS
may resume diet as prior to surgery You have a post op appointment with Dr. Farah on December 21, 2017 @ 8:30am in the [address] [place] [address] office. If you are unable to keep this appointment, please call the office to reschedule. Call MD if you develop a fever, or if there is redness, swelling, drainage or pain not relieved by pain medication. No heavy lifting, bending, or straining to move your bowels. Take over the counter stool softeners as needed to prevent constipation which may be caused by pain medication.

## 2017-12-05 NOTE — DISCHARGE NOTE ADULT - HOSPITAL COURSE
Patient is a 66 y/o female s/p right knee arthroplasty on 12/4/17. Patient tolerated the procedure well without any intraoperative complications. Patient states pain is controlled. Tolerated Physical Therapy well. As per Dr Farah patient is stable and ready for discharge. Patient was seen by Dr Franklin, medical attending for continued care and management and pain is controlled.     Please follow up with Dr Farah in 1-2 weeks. Call office to make an appointment. Please follow up with your PMD for continuity of care and management. Please keep aquacel dressing in place until post op day # 14. Any sutures/staples to be removed on post op day # 14.  Please take aspirin twice daily for DVT Prophylaxis. Weight bearing as tolerated. Call orthopaedics with any questions. Patient is a 68 y/o female s/p right knee arthroplasty on 12/4/17. Patient tolerated the procedure well without any intraoperative complications. Patient states pain is controlled. Tolerated Physical Therapy well. As per Dr Farah patient is stable and ready for discharge. Patient was seen by Dr Franklin, medical attending for continued care and management and pain is controlled.     Please follow up with Dr Farah in 1-2 weeks. Call office to make an appointment. Please follow up with your PMD for continuity of care and management. Please keep aquacel dressing in place until post op day # 14. Any sutures/staples to be removed on post op day # 14.  Please take aspirin twice daily for DVT Prophylaxis / blood thinner. Weight bearing as tolerated. Call orthopaedics with any questions.

## 2017-12-05 NOTE — DISCHARGE NOTE ADULT - PATIENT PORTAL LINK FT
“You can access the [facility] Patient Portal, offered by [facility], by registering with the following website: [email or web address]”

## 2017-12-05 NOTE — CONSULT NOTE ADULT - ASSESSMENT
67F with OA s/p Rt THR on 12/4 complicated by post-op anemia, leukocytosis, CREST, hypothyroid, Breast CA s/p lumpectomy/rad tx.

## 2017-12-05 NOTE — DISCHARGE NOTE ADULT - PLAN OF CARE
Pain reduction, improve ambulation and ADLs Please follow up with Dr Farah in 1-2 weeks. Call office to make an appointment. Please follow up with your PMD for continuity of care and management. Please keep aquacel dressing in place until post op day # 14. Any sutures/staples to be removed on post op day # 14.  Please take aspirin twice daily for DVT Prophylaxis / blood thinner. Weight bearing as tolerated. Call orthopaedics with any questions.

## 2017-12-05 NOTE — DISCHARGE NOTE ADULT - MEDICATION SUMMARY - MEDICATIONS TO STOP TAKING
I will STOP taking the medications listed below when I get home from the hospital:  None I will STOP taking the medications listed below when I get home from the hospital:    doxycycline  -- 1 tab(s) by mouth 2 times a day last dose 11/22/17    ibuprofen 800 mg oral tablet  -- 1 tab(s) by mouth 3 times a day  last dose 11/27/17

## 2017-12-05 NOTE — OCCUPATIONAL THERAPY INITIAL EVALUATION ADULT - GENERAL OBSERVATIONS, REHAB EVAL
Pt received sitting in chair. + Hemovac. +right LE dressing. + IV fluids. Pt able to perform left single leg raise.

## 2017-12-05 NOTE — DISCHARGE NOTE ADULT - CARE PROVIDER_API CALL
Shamir Farah), Orthopaedic Surgery  [address] [address]  Phone: [phone]  Fax: [phone]

## 2017-12-05 NOTE — DISCHARGE NOTE ADULT - CONDITIONS AT DISCHARGE
Pt. is afebrile and offers no complaints. In no acute distress. Right knee aquacel dressing: clean, dry and intact. Pt is ambulating with a walker, tolerating diet well, and voiding in adequate amounts.

## 2017-12-05 NOTE — DISCHARGE NOTE ADULT - CARE PLAN
Principal Discharge DX:	Primary osteoarthritis of right knee  Goal:	Pain reduction, improve ambulation and ADLs  Instructions for follow-up, activity and diet:	Please follow up with Dr Farah in 1-2 weeks. Call office to make an appointment. Please follow up with your PMD for continuity of care and management. Please keep aquacel dressing in place until post op day # 14. Any sutures/staples to be removed on post op day # 14.  Please take aspirin twice daily for DVT Prophylaxis / blood thinner. Weight bearing as tolerated. Call orthopaedics with any questions.

## 2017-12-05 NOTE — CONSULT NOTE ADULT - SUBJECTIVE AND OBJECTIVE BOX
Patient is a 67y old  Female who presents with a chief complaint of "Right Knee Replacement" (2017 08:40)      HPI:  68 y/o female presents to PST for preoperative evaluation with dx of unilateral primary osteoarthritis right knee. Pt reports right knee pain started in 2016. c/o chronic sharp throbbing pain that is made worse with activity limiting her ability to perform some ADLs. Pt also states right knee pain keeps her up at night and despite conservative measures, pain persists. Scheduled for Right Total Knee Replacement on 2017. (2017 08:40) Patient tolerated the procedure well.  Pain is described as throbbing, 10/10 without pain meds, 5/10 with pain medications.  Non-radiating, worse with movement, located on the surgical area.  Also complaining of constipation.      No F/C, N/V, CP, SOB, Cough, lightheadedness, dizziness, abdominal pain, diarrhea, dysuria.    Pain Symptoms if applicable:             	                         none	   mild         moderate         severe  Pain:	            5                0	    1-3	     4-6	         7-10  Location:	Surgical site  Modifying factors:	Worse with movement  Associated symptoms:	    Allergies    No Known Allergies    Intolerances        HOME MEDICATIONS: Reviewed    MEDICATIONS  (STANDING):  acetaminophen   Tablet. 650 milliGRAM(s) Oral every 6 hours  aspirin enteric coated 325 milliGRAM(s) Oral two times a day  docusate sodium 100 milliGRAM(s) Oral three times a day  fluticasone propionate 50 MICROgram(s)/spray Nasal Spray 2 Spray(s) Both Nostrils daily  gabapentin 100 milliGRAM(s) Oral every 8 hours  ketorolac   Injectable 15 milliGRAM(s) IV Push every 6 hours  lactated ringers. 1000 milliLiter(s) (150 mL/Hr) IV Continuous <Continuous>  levothyroxine 125 MICROGram(s) Oral daily  loratadine 10 milliGRAM(s) Oral daily  NIFEdipine XL 30 milliGRAM(s) Oral daily  pantoprazole    Tablet 40 milliGRAM(s) Oral daily  polyethylene glycol 3350 17 Gram(s) Oral daily  senna 2 Tablet(s) Oral at bedtime  sodium chloride 0.9% lock flush 3 milliLiter(s) IV Push every 8 hours  traMADol 50 milliGRAM(s) Oral every 8 hours    MEDICATIONS  (PRN):  acetaminophen   Tablet 650 milliGRAM(s) Oral every 6 hours PRN For Temp over 38.3 C (100.94 F)  aluminum hydroxide/magnesium hydroxide/simethicone Suspension 30 milliLiter(s) Oral four times a day PRN Indigestion  morphine  - Injectable 2 milliGRAM(s) IV Push every 4 hours PRN breakthrough pain  ondansetron Injectable 4 milliGRAM(s) IV Push every 6 hours PRN Nausea and/or Vomiting  oxyCODONE    IR 5 milliGRAM(s) Oral every 4 hours PRN mild and moderate pain  oxyCODONE    IR 10 milliGRAM(s) Oral every 4 hours PRN Severe Pain (7 - 10)  phenazopyridine 100 milliGRAM(s) Oral daily PRN urinary pain relief for chronic bladder infection      PAST MEDICAL & SURGICAL HISTORY:  Bladder infection, chronic  Chronic sinusitis  Varicose veins  Lymphedema: right arm  Hypothyroid  Primary osteoarthritis of right knee  Breast cancer: right breast CA in   Scleroderma  CREST (calcinosis, Raynaud's phenomenon, esophageal dysfunction, sclerodactyly, telangiectasia): affecting lungs, bladder, kidney&#x27;s, esophagus, skin, eyes and ears  S/P radiation therapy: for 9 weeks  H/O lumpectomy: of right breast in  with removal of 2 lymph nodes      SOCIAL HISTORY:  No tobacco/alcohol use/abuse.    FAMILY HISTORY:  Family history of pancreatic cancer (Grandparent)  Family history of heart attack (Mother): mother  age 69  Family history of breast cancer (Grandparent)      REVIEW OF SYSTEMS:    CONSTITUTIONAL: No fever, weight loss, or fatigue  EYES: No eye pain, visual disturbances, or discharge  ENMT:  No difficulty hearing, tinnitus, vertigo; No sinus or throat pain  NECK: No pain or stiffness  BREASTS: No pain, masses, or nipple discharge  RESPIRATORY: No cough, wheezing, chills or hemoptysis; No shortness of breath  CARDIOVASCULAR: No chest pain, palpitations, dizziness, or leg swelling  GASTROINTESTINAL: No abdominal or epigastric pain. No nausea, vomiting, or hematemesis; No diarrhea or constipation. No melena or hematochezia.  GENITOURINARY: No dysuria, frequency, hematuria, or incontinence  NEUROLOGICAL: No headaches, memory loss, loss of strength, numbness, or tremors  SKIN: No itching, burning, rashes, or lesions   LYMPH NODES: No enlarged glands  ENDOCRINE: No heat or cold intolerance; No hair loss  MUSCULOSKELETAL: Rt knee pain.  PSYCHIATRIC: No depression, anxiety, mood swings, or difficulty sleeping  HEME/LYMPH: No easy bruising, or bleeding gums  ALLERGY AND IMMUNOLOGIC: No hives or eczema      Vital Signs Last 24 Hrs  T(C): 36.8 (05 Dec 2017 09:27), Max: 37 (05 Dec 2017 01:27)  T(F): 98.2 (05 Dec 2017 09:27), Max: 98.6 (05 Dec 2017 01:27)  HR: 69 (05 Dec 2017 09:) (69 - 79)  BP: 132/62 (05 Dec 2017 09:) (93/71 - 132/62)  BP(mean): --  RR: 16 (05 Dec 2017 09:) (12 - 20)  SpO2: 95% (05 Dec 2017 09:) (95% - 100%)  CAPILLARY BLOOD GLUCOSE          PHYSICAL EXAM:    GENERAL: NAD, well-groomed, well-developed  HEAD:  Atraumatic, Normocephalic  EYES: EOMI, PERRLA, conjunctiva and sclera clear  ENMT: Moist mucous membranes  NECK: Supple, No JVD  RESPIRATORY: Clear to auscultation bilaterally; No rales, rhonchi, wheezing, or rubs  CARDIOVASCULAR: Regular rate and rhythm; No murmurs, rubs, or gallops  GASTROINTESTINAL: Soft, Nontender, Nondistended; Bowel sounds present  BACK: No tenderness  GENITOURINARY: Not examined  EXTREMITIES:  2+ Peripheral Pulses, No clubbing, cyanosis, or edema. Rt knee limited ROM due to pain.  Drain in place.  NERVOUS SYSTEM:  Alert & Oriented X3; Moving all 4 extremities; No gross sensory deficits  PSYCH: Calm  HEME/LYMPH: No lymphadenopathy noted  SKIN: No rashes or lesions; telangiectasias, Surgical dressing C/D/I    LABS:                        9.2    11.61 )-----------( 171      ( 05 Dec 2017 06:15 )             28.7     12-05    139  |  109<H>  |  15  ----------------------------<  107<H>  4.4   |  21<L>  |  0.74    Ca    8.0<L>      05 Dec 2017 06:15          CAPILLARY BLOOD GLUCOSE          RADIOLOGY & ADDITIONAL STUDIES:    Imaging:   < from: Xray Knee 1 or 2 Views, Right (12.04.17 @ 11:08) >  EXAM:  RAD KNEE 1 OR 2 VIEWS RIGHT        PROCEDURE DATE:  Dec  4 2017         INTERPRETATION:  CLINICAL INDICATION: baseline postoperative evaluation   status post right total knee replacement for osteoarthritis    EXAM:  Frontal crosstable lateral right knee from 2017 at 1108.    IMPRESSION:  Unconstrained right total knee prosthesis implanted.    Intact and aligned hardware and no periprosthetic fractures.    Postoperative soft tissue changes.    Surgical drain overlies operative site.    Correlate with intraoperative findings.                   RALPH GARBER M.D., ATTENDING RADIOLOGIST  This document has been electronically signed. Dec  4 2017 12:04PM    < end of copied text >    Personally Reviewed:  [x] YES               EKG:   Personally Reviewed:  [ ] YES       Care Discussed with Consultant(s)/Other Providers:  Care Discussed with Primary Team.      [] Increased delirium risk  [] Delirium and other risks can be reduced by:          -early ambulation          -minimizing "tethers" - IV, oxygen, catheters, etc          -avoiding hypnotics and sedatives          -maintaining hydration/nutrition          -avoid anticholinergics - diphenhydramine, etc          -pain control          -supportive environment

## 2017-12-05 NOTE — DISCHARGE NOTE ADULT - MEDICATION SUMMARY - MEDICATIONS TO TAKE
I will START or STAY ON the medications listed below when I get home from the hospital:    loratadine 10 mg oral tablet  -- 1 tab(s) by mouth once a day  -- Indication: For Home med     NIFEdipine 30 mg oral tablet, extended release  -- 1 tab(s) by mouth once a day  -- Indication: For Home med     docusate sodium 100 mg oral capsule  -- 1 cap(s) by mouth 3 times a day  -- Indication: For Stool softener     phenazopyridine 100 mg oral tablet  -- 1 tab(s) by mouth , As Needed  -- Indication: For Home med     Nasonex 50 mcg/inh nasal spray  -- 2 spray(s) into nose once a day  -- Indication: For Home med     omeprazole 40 mg oral delayed release capsule  -- 1 cap(s) by mouth once a day am  -- Indication: For gerd    levothyroxine 125 mcg (0.125 mg) oral capsule  -- 1 cap(s) by mouth once a day  -- Indication: For Hypothryoidism     Vitamin D3 5000 intl units oral capsule  -- 1 cap(s) by mouth once a day  -- Indication: For Supplement I will START or STAY ON the medications listed below when I get home from the hospital:    Mobic 15 mg oral tablet  -- 1 tab(s) by mouth once a day MDD:1  -- Indication: For Pain med     traMADol 50 mg oral tablet  -- 1 tab(s) by mouth every 8 hours MDD:3  -- Indication: For Pain med     aspirin 325 mg oral delayed release tablet  -- 1 tab(s) by mouth 2 times a day MDD:2  -- Indication: For Blood thinner     HYDROmorphone 2 mg oral tablet  -- 1-2  tab(s) by mouth every 4-6 hours, As needed for pain MDD:8  -- Indication: For Pain med     gabapentin 100 mg oral capsule  -- 1 cap(s) by mouth every 8 hours MDD:2  -- Indication: For Pain med     loratadine 10 mg oral tablet  -- 1 tab(s) by mouth once a day  -- Indication: For Home med     NIFEdipine 30 mg oral tablet, extended release  -- 1 tab(s) by mouth once a day  -- Indication: For Home med     docusate sodium 100 mg oral capsule  -- 1 cap(s) by mouth 3 times a day  -- Indication: For Stool softener     senna oral tablet  -- 2 tab(s) by mouth once a day (at bedtime) MDD:2  -- Indication: For Stool softener     phenazopyridine 100 mg oral tablet  -- 1 tab(s) by mouth , As Needed  -- Indication: For Home med     Nasonex 50 mcg/inh nasal spray  -- 2 spray(s) into nose once a day  -- Indication: For Home med     omeprazole 40 mg oral delayed release capsule  -- 1 cap(s) by mouth once a day am  -- Indication: For gerd    levothyroxine 125 mcg (0.125 mg) oral capsule  -- 1 cap(s) by mouth once a day  -- Indication: For Hypothryoidism     Vitamin D3 5000 intl units oral capsule  -- 1 cap(s) by mouth once a day  -- Indication: For Supplement I will START or STAY ON the medications listed below when I get home from the hospital:    Mobic 15 mg oral tablet  -- 1 tab(s) by mouth once a day MDD:1  -- Indication: For Pain med     traMADol 50 mg oral tablet  -- 1 tab(s) by mouth every 8 hours MDD:3  -- Indication: For Pain med     aspirin 325 mg oral delayed release tablet  -- 1 tab(s) by mouth 2 times a day MDD:2  -- Indication: For Blood thinner     HYDROmorphone 2 mg oral tablet  -- 1-2  tab(s) by mouth every 4-6 hours, As needed for pain MDD:8  -- Indication: For Pain med     Valium 5 mg oral tablet  -- 1 tab(s) by mouth every 12 hours x 3 days  PRN Anxiety MDD:2     -- Caution federal law prohibits the transfer of this drug to any person other  than the person for whom it was prescribed.  Do not take this drug if you are pregnant.  May cause drowsiness.  Alcohol may intensify this effect.  Use care when operating dangerous machinery.    -- Indication: For Anxiety    gabapentin 100 mg oral capsule  -- 1 cap(s) by mouth every 8 hours MDD:2  -- Indication: For Pain med     loratadine 10 mg oral tablet  -- 1 tab(s) by mouth once a day  -- Indication: For Home med     NIFEdipine 30 mg oral tablet, extended release  -- 1 tab(s) by mouth once a day  -- Indication: For Home med     docusate sodium 100 mg oral capsule  -- 1 cap(s) by mouth 3 times a day  -- Indication: For Stool softener     senna oral tablet  -- 2 tab(s) by mouth once a day (at bedtime) MDD:2  -- Indication: For Stool softener     phenazopyridine 100 mg oral tablet  -- 1 tab(s) by mouth , As Needed  -- Indication: For Home med     Nasonex 50 mcg/inh nasal spray  -- 2 spray(s) into nose once a day  -- Indication: For Home med     omeprazole 40 mg oral delayed release capsule  -- 1 cap(s) by mouth once a day am  -- Indication: For gerd    levothyroxine 125 mcg (0.125 mg) oral capsule  -- 1 cap(s) by mouth once a day  -- Indication: For Hypothryoidism     Vitamin D3 5000 intl units oral capsule  -- 1 cap(s) by mouth once a day  -- Indication: For Supplement

## 2017-12-06 VITALS
TEMPERATURE: 98 F | OXYGEN SATURATION: 95 % | HEART RATE: 72 BPM | SYSTOLIC BLOOD PRESSURE: 116 MMHG | DIASTOLIC BLOOD PRESSURE: 55 MMHG | RESPIRATION RATE: 16 BRPM

## 2017-12-06 PROCEDURE — 99233 SBSQ HOSP IP/OBS HIGH 50: CPT

## 2017-12-06 PROCEDURE — 99238 HOSP IP/OBS DSCHRG MGMT 30/<: CPT

## 2017-12-06 RX ORDER — DIAZEPAM 5 MG
1 TABLET ORAL
Qty: 6 | Refills: 0 | OUTPATIENT
Start: 2017-12-06 | End: 2017-12-09

## 2017-12-06 RX ORDER — HYDROMORPHONE HYDROCHLORIDE 2 MG/ML
2 INJECTION INTRAMUSCULAR; INTRAVENOUS; SUBCUTANEOUS EVERY 4 HOURS
Qty: 0 | Refills: 0 | Status: DISCONTINUED | OUTPATIENT
Start: 2017-12-06 | End: 2017-12-06

## 2017-12-06 RX ORDER — TRAMADOL HYDROCHLORIDE 50 MG/1
1 TABLET ORAL
Qty: 0 | Refills: 0 | COMMUNITY

## 2017-12-06 RX ORDER — TRAMADOL HYDROCHLORIDE 50 MG/1
1 TABLET ORAL
Qty: 21 | Refills: 0
Start: 2017-12-06 | End: 2017-12-13

## 2017-12-06 RX ORDER — LORATADINE 10 MG/1
1 TABLET ORAL
Qty: 0 | Refills: 0 | DISCHARGE
Start: 2017-12-06

## 2017-12-06 RX ORDER — GABAPENTIN 400 MG/1
1 CAPSULE ORAL
Qty: 42 | Refills: 0
Start: 2017-12-06 | End: 2017-12-20

## 2017-12-06 RX ORDER — DOCUSATE SODIUM 100 MG
1 CAPSULE ORAL
Qty: 0 | Refills: 0 | DISCHARGE
Start: 2017-12-06

## 2017-12-06 RX ORDER — MELOXICAM 15 MG/1
1 TABLET ORAL
Qty: 30 | Refills: 0
Start: 2017-12-06 | End: 2018-01-05

## 2017-12-06 RX ORDER — LORATADINE 10 MG/1
1 TABLET ORAL
Qty: 0 | Refills: 0 | COMMUNITY

## 2017-12-06 RX ORDER — HYDROMORPHONE HYDROCHLORIDE 2 MG/ML
1 INJECTION INTRAMUSCULAR; INTRAVENOUS; SUBCUTANEOUS
Qty: 60 | Refills: 0
Start: 2017-12-06 | End: 2017-12-13

## 2017-12-06 RX ORDER — NIFEDIPINE 30 MG
1 TABLET, EXTENDED RELEASE 24 HR ORAL
Qty: 0 | Refills: 0 | DISCHARGE
Start: 2017-12-06

## 2017-12-06 RX ORDER — SENNA PLUS 8.6 MG/1
2 TABLET ORAL
Qty: 10 | Refills: 0
Start: 2017-12-06 | End: 2017-12-11

## 2017-12-06 RX ORDER — NIFEDIPINE 30 MG
1 TABLET, EXTENDED RELEASE 24 HR ORAL
Qty: 0 | Refills: 0 | COMMUNITY

## 2017-12-06 RX ORDER — ASPIRIN/CALCIUM CARB/MAGNESIUM 324 MG
1 TABLET ORAL
Qty: 84 | Refills: 0
Start: 2017-12-06 | End: 2018-01-17

## 2017-12-06 RX ORDER — DIAZEPAM 5 MG
1 TABLET ORAL
Qty: 6 | Refills: 0
Start: 2017-12-06 | End: 2017-12-09

## 2017-12-06 RX ORDER — DIAZEPAM 5 MG
5 TABLET ORAL ONCE
Qty: 0 | Refills: 0 | Status: DISCONTINUED | OUTPATIENT
Start: 2017-12-06 | End: 2017-12-06

## 2017-12-06 RX ORDER — IBUPROFEN 200 MG
1 TABLET ORAL
Qty: 0 | Refills: 0 | COMMUNITY

## 2017-12-06 RX ORDER — HYDROMORPHONE HYDROCHLORIDE 2 MG/ML
0.5 INJECTION INTRAMUSCULAR; INTRAVENOUS; SUBCUTANEOUS ONCE
Qty: 0 | Refills: 0 | Status: DISCONTINUED | OUTPATIENT
Start: 2017-12-06 | End: 2017-12-06

## 2017-12-06 RX ORDER — DIPHENHYDRAMINE HCL 50 MG
25 CAPSULE ORAL EVERY 4 HOURS
Qty: 0 | Refills: 0 | Status: DISCONTINUED | OUTPATIENT
Start: 2017-12-06 | End: 2017-12-06

## 2017-12-06 RX ORDER — HYDROMORPHONE HYDROCHLORIDE 2 MG/ML
4 INJECTION INTRAMUSCULAR; INTRAVENOUS; SUBCUTANEOUS EVERY 6 HOURS
Qty: 0 | Refills: 0 | Status: DISCONTINUED | OUTPATIENT
Start: 2017-12-06 | End: 2017-12-06

## 2017-12-06 RX ADMIN — Medication 5 MILLIGRAM(S): at 06:46

## 2017-12-06 RX ADMIN — Medication 650 MILLIGRAM(S): at 12:11

## 2017-12-06 RX ADMIN — CELECOXIB 200 MILLIGRAM(S): 200 CAPSULE ORAL at 09:01

## 2017-12-06 RX ADMIN — Medication 325 MILLIGRAM(S): at 05:45

## 2017-12-06 RX ADMIN — OXYCODONE HYDROCHLORIDE 10 MILLIGRAM(S): 5 TABLET ORAL at 06:15

## 2017-12-06 RX ADMIN — HYDROMORPHONE HYDROCHLORIDE 2 MILLIGRAM(S): 2 INJECTION INTRAMUSCULAR; INTRAVENOUS; SUBCUTANEOUS at 12:00

## 2017-12-06 RX ADMIN — HYDROMORPHONE HYDROCHLORIDE 2 MILLIGRAM(S): 2 INJECTION INTRAMUSCULAR; INTRAVENOUS; SUBCUTANEOUS at 11:32

## 2017-12-06 RX ADMIN — LORATADINE 10 MILLIGRAM(S): 10 TABLET ORAL at 12:11

## 2017-12-06 RX ADMIN — Medication 2 SPRAY(S): at 06:54

## 2017-12-06 RX ADMIN — TRAMADOL HYDROCHLORIDE 50 MILLIGRAM(S): 50 TABLET ORAL at 00:15

## 2017-12-06 RX ADMIN — Medication 25 MILLIGRAM(S): at 01:59

## 2017-12-06 RX ADMIN — TRAMADOL HYDROCHLORIDE 50 MILLIGRAM(S): 50 TABLET ORAL at 05:45

## 2017-12-06 RX ADMIN — OXYCODONE HYDROCHLORIDE 10 MILLIGRAM(S): 5 TABLET ORAL at 05:45

## 2017-12-06 RX ADMIN — GABAPENTIN 100 MILLIGRAM(S): 400 CAPSULE ORAL at 05:45

## 2017-12-06 RX ADMIN — Medication 650 MILLIGRAM(S): at 05:45

## 2017-12-06 RX ADMIN — HYDROMORPHONE HYDROCHLORIDE 0.5 MILLIGRAM(S): 2 INJECTION INTRAMUSCULAR; INTRAVENOUS; SUBCUTANEOUS at 01:59

## 2017-12-06 RX ADMIN — Medication 100 MILLIGRAM(S): at 05:46

## 2017-12-06 RX ADMIN — Medication 125 MICROGRAM(S): at 05:45

## 2017-12-06 RX ADMIN — OXYCODONE HYDROCHLORIDE 10 MILLIGRAM(S): 5 TABLET ORAL at 00:34

## 2017-12-06 RX ADMIN — HYDROMORPHONE HYDROCHLORIDE 0.5 MILLIGRAM(S): 2 INJECTION INTRAMUSCULAR; INTRAVENOUS; SUBCUTANEOUS at 02:30

## 2017-12-06 RX ADMIN — PANTOPRAZOLE SODIUM 40 MILLIGRAM(S): 20 TABLET, DELAYED RELEASE ORAL at 12:11

## 2017-12-06 RX ADMIN — SODIUM CHLORIDE 3 MILLILITER(S): 9 INJECTION INTRAMUSCULAR; INTRAVENOUS; SUBCUTANEOUS at 05:46

## 2017-12-06 RX ADMIN — TRAMADOL HYDROCHLORIDE 50 MILLIGRAM(S): 50 TABLET ORAL at 06:15

## 2017-12-06 RX ADMIN — OXYCODONE HYDROCHLORIDE 10 MILLIGRAM(S): 5 TABLET ORAL at 01:00

## 2017-12-06 RX ADMIN — Medication 650 MILLIGRAM(S): at 06:15

## 2017-12-06 RX ADMIN — Medication 650 MILLIGRAM(S): at 00:15

## 2017-12-06 NOTE — PROGRESS NOTE ADULT - PROBLEM SELECTOR PLAN 3
Likely due to post-op blood loss.  No indication for transfusion.  Monitor CBC if clinically indicated.

## 2017-12-06 NOTE — PROGRESS NOTE ADULT - ASSESSMENT
67y Female s/p R total knee arthroplasty, POD # 2  - Pain control / Analgesia  - Anticoagulation ASA BID  -PT/OT  -WBAT  -OOB  -Inc Spirometry  -Foot Pumps  - Dispo planning
A/P: Patient is a 67y Female s/p R total knee arthroplasty, POD # 1    - Pain control / Analgesia  -Monitor HV output  - Anticoagulation ASA BID  -PT/OT  -WBAT  -OOB  -Inc Spirometry  -Foot Pumps  -F/U AM Labs  -Notify Ortho with any questions
67F with OA s/p Rt THR on 12/4 complicated by post-op anemia, leukocytosis, CREST, hypothyroid, Breast CA s/p lumpectomy/rad tx.

## 2017-12-06 NOTE — PROGRESS NOTE ADULT - SUBJECTIVE AND OBJECTIVE BOX
ANESTHESIA POSTOP CHECK    67y Female POSTOP DAY 1 S/P right TKR    Vital Signs Last 24 Hrs  T(C): 36.8 (05 Dec 2017 09:27), Max: 37 (05 Dec 2017 01:27)  T(F): 98.2 (05 Dec 2017 09:27), Max: 98.6 (05 Dec 2017 01:27)  HR: 69 (05 Dec 2017 09:27) (69 - 79)  BP: 132/62 (05 Dec 2017 09:27) (93/71 - 132/62)  BP(mean): --  RR: 16 (05 Dec 2017 09:27) (12 - 20)  SpO2: 95% (05 Dec 2017 09:27) (95% - 100%)  I&O's Summary    04 Dec 2017 07:01  -  05 Dec 2017 07:00  --------------------------------------------------------  IN: 1720 mL / OUT: 2270 mL / NET: -550 mL    05 Dec 2017 07:01  -  05 Dec 2017 10:55  --------------------------------------------------------  IN: 0 mL / OUT: 415 mL / NET: -415 mL        [x ] NO APPARENT ANESTHESIA COMPLICATIONS
Ortho PA Post Op Check    Patient resting comfortably, pain controlled, no acute events.  Patient denies any chest pain, shortness of breath, nausea, vomiting, diarrhea, headache or dizziness.  Patient currently has no complaints.    Vital Signs: Vital Signs Last 24 Hrs  T(C): 36.4 (04 Dec 2017 11:20), Max: 36.4 (04 Dec 2017 11:20)  T(F): 97.5 (04 Dec 2017 11:20), Max: 97.5 (04 Dec 2017 11:20)  HR: 72 (04 Dec 2017 12:00) (66 - 79)  BP: 115/61 (04 Dec 2017 12:00) (93/71 - 131/90)  BP(mean): --  RR: 12 (04 Dec 2017 12:00) (12 - 20)  SpO2: 95% (04 Dec 2017 12:00) (95% - 98%)    Gen: Alert, NAD  RLE: HV unsewn inplace to gravity, dressing clean, dry and intact.  Extremity warm, brisk capillary refill, compartments soft, +2 DP pulse, no calf tenderness.  5/5 EHL/FHL/GS/TA, SILT S/S/DP/SP/T    Labs:     A/P: 67y Female s/p right total knee replacement  1. Pain management  2. Ancef x24 hours  3. WBAT   4. PT/OT  5. ASA BID for DVT ppx   6. Venodynes  7. Incentive spirometry  8. F/u HV output  9. D/c planning
Patient is seen and examined.   C/o R knee pain.  States that she feels anxious this am, requests something for her "nerves."  No acute events overnight.   Pulled out her hemovac by accident overnight.     ICU Vital Signs Last 24 Hrs  T(C): 36.8 (06 Dec 2017 01:51), Max: 37.1 (05 Dec 2017 23:10)  T(F): 98.2 (06 Dec 2017 01:51), Max: 98.7 (05 Dec 2017 23:10)  HR: 86 (06 Dec 2017 01:51) (69 - 86)  BP: 109/60 (06 Dec 2017 01:51) (109/42 - 132/62)  BP(mean): --  ABP: --  ABP(mean): --  RR: 16 (06 Dec 2017 01:51) (16 - 16)  SpO2: 94% (06 Dec 2017 01:51) (94% - 97%)    Gen: NAD  R knee in BJ dressing, C/D/I  Motor intact 5/5 RLE. Sensation is grossly intact in the RLE.   Compartments are soft, extremities are warm. DP 1+ RLE.
Patient is seen and examined. Denies CP/SOB/DIzziness/N/V/D/HA. Pain is controlled. No acute events overnight.     Vital Signs Last 24 Hrs  T(C): 37 (05 Dec 2017 01:27), Max: 37 (05 Dec 2017 01:27)  T(F): 98.6 (05 Dec 2017 01:27), Max: 98.6 (05 Dec 2017 01:27)  HR: 73 (05 Dec 2017 01:27) (66 - 79)  BP: 120/57 (05 Dec 2017 01:27) (93/71 - 132/52)  BP(mean): --  RR: 18 (05 Dec 2017 01:27) (12 - 20)  SpO2: 95% (05 Dec 2017 01:27) (95% - 100%)    Gen: NAD    RLE: Dressing C/D/I. HV is in place.   Motor intact 5/5 RLE. Sensation is grossly intact in the RLE. Compartments are soft, extremities are warm. DP 1+ RLE.    Labs:                        11.3   7.64  )-----------( 214      ( 04 Dec 2017 11:54 )             36.9       12-04    143  |  108<H>  |  19  ----------------------------<  136<H>  4.2   |  22  |  0.76    Ca    9.2      04 Dec 2017 11:54
Patient spoken to in regards to outpatient prescription for Valium. Patient is requesting 7-10 days supply, however patient is not medically diagnosed with anxiety as this time. Discussion with patient PMD Dr Amico and Hospitalist Dr Franklin, all parties agree that a 3 day supply of BID valium 5mg PRN Anxiety is sufficient. Patient understands that if she requires further evaluation, may visit PMD for psychiatric evaluation. Patient had inpatient psychiatric evaluation and consultation offered for further care and management offered, however patient declined.
CC: F/U for pain control.    SUBJECTIVE / OVERNIGHT EVENTS:  Difficulty with pain control overnight.  describes as "11/10" pain, not helping with oxycodone.  Given dilaudid and valium.  Currently severity is decreased to 4/10, at the surgical site, throbbing in nature.  No F/C, N/V, CP, SOB, Cough, lightheadedness, dizziness, abdominal pain, diarrhea, dysuria.    MEDICATIONS  (STANDING):  acetaminophen   Tablet. 650 milliGRAM(s) Oral every 6 hours  aspirin enteric coated 325 milliGRAM(s) Oral two times a day  celecoxib 200 milliGRAM(s) Oral with breakfast  docusate sodium 100 milliGRAM(s) Oral three times a day  fluticasone propionate 50 MICROgram(s)/spray Nasal Spray 2 Spray(s) Both Nostrils daily  gabapentin 100 milliGRAM(s) Oral every 8 hours  levothyroxine 125 MICROGram(s) Oral daily  loratadine 10 milliGRAM(s) Oral daily  NIFEdipine XL 30 milliGRAM(s) Oral daily  pantoprazole    Tablet 40 milliGRAM(s) Oral daily  polyethylene glycol 3350 17 Gram(s) Oral daily  senna 2 Tablet(s) Oral at bedtime  sodium chloride 0.9% lock flush 3 milliLiter(s) IV Push every 8 hours  traMADol 50 milliGRAM(s) Oral every 8 hours    MEDICATIONS  (PRN):  acetaminophen   Tablet 650 milliGRAM(s) Oral every 6 hours PRN For Temp over 38.3 C (100.94 F)  aluminum hydroxide/magnesium hydroxide/simethicone Suspension 30 milliLiter(s) Oral four times a day PRN Indigestion  bisacodyl Suppository 10 milliGRAM(s) Rectal daily PRN If no bowel movement by postoperative day #2  diphenhydrAMINE   Capsule 25 milliGRAM(s) Oral every 4 hours PRN Rash and/or Itching or Insomnia  HYDROmorphone   Tablet 2 milliGRAM(s) Oral every 4 hours PRN Mild Pain (1 - 3)  HYDROmorphone   Tablet 4 milliGRAM(s) Oral every 6 hours PRN Moderate Pain (4 - 6)  morphine  - Injectable 2 milliGRAM(s) IV Push every 4 hours PRN breakthrough pain  ondansetron Injectable 4 milliGRAM(s) IV Push every 6 hours PRN Nausea and/or Vomiting  phenazopyridine 100 milliGRAM(s) Oral daily PRN urinary pain relief for chronic bladder infection      Vital Signs Last 24 Hrs  T(C): 36.6 (06 Dec 2017 10:03), Max: 37.1 (05 Dec 2017 23:10)  T(F): 97.9 (06 Dec 2017 10:03), Max: 98.7 (05 Dec 2017 23:10)  HR: 72 (06 Dec 2017 10:03) (72 - 89)  BP: 116/55 (06 Dec 2017 10:03) (109/42 - 127/60)  BP(mean): --  RR: 16 (06 Dec 2017 10:03) (16 - 16)  SpO2: 95% (06 Dec 2017 10:03) (94% - 97%)  CAPILLARY BLOOD GLUCOSE        I&O's Summary    05 Dec 2017 07:01  -  06 Dec 2017 07:00  --------------------------------------------------------  IN: 0 mL / OUT: 725 mL / NET: -725 mL        PHYSICAL EXAM:  GENERAL: NAD, well-developed  HEAD:  Atraumatic, Normocephalic  EYES: EOMI, PERRLA, conjunctiva and sclera clear  NECK: Supple, No JVD  CHEST/LUNG: Clear to auscultation bilaterally; No wheeze  HEART: Regular rate and rhythm; No murmurs, rubs, or gallops  ABDOMEN: Soft, Nontender, Nondistended; Bowel sounds present  BACK: Non tender, ROM intact.  EXTREMITIES:  2+ Peripheral Pulses, No clubbing, cyanosis. RLE limited ROM due to pain, mild edema.  PSYCH: Calm  NEUROLOGY: AAOx3, non-focal neurological exam  SKIN: Surgical dressing C/D/I    LABS:                        9.2    11.61 )-----------( 171      ( 05 Dec 2017 06:15 )             28.7     12-05    139  |  109<H>  |  15  ----------------------------<  107<H>  4.4   |  21<L>  |  0.74    Ca    8.0<L>      05 Dec 2017 06:15                RADIOLOGY & ADDITIONAL TESTS:    Imaging Personally Reviewed:    Care Discussed with Consultants/Other Providers:    Care Discussed with primary team.

## 2017-12-08 ENCOUNTER — TRANSCRIPTION ENCOUNTER (OUTPATIENT)
Age: 67
End: 2017-12-08

## 2017-12-08 ENCOUNTER — RESULT REVIEW (OUTPATIENT)
Age: 67
End: 2017-12-08

## 2017-12-08 LAB — SURGICAL PATHOLOGY STUDY: SIGNIFICANT CHANGE UP

## 2017-12-14 ENCOUNTER — APPOINTMENT (OUTPATIENT)
Dept: ORTHOPEDIC SURGERY | Facility: CLINIC | Age: 67
End: 2017-12-14
Payer: COMMERCIAL

## 2017-12-14 VITALS
DIASTOLIC BLOOD PRESSURE: 78 MMHG | SYSTOLIC BLOOD PRESSURE: 125 MMHG | HEIGHT: 61 IN | WEIGHT: 152 LBS | BODY MASS INDEX: 28.7 KG/M2 | HEART RATE: 81 BPM

## 2017-12-14 PROCEDURE — 99024 POSTOP FOLLOW-UP VISIT: CPT

## 2017-12-14 PROCEDURE — 73562 X-RAY EXAM OF KNEE 3: CPT | Mod: RT

## 2017-12-27 ENCOUNTER — RX RENEWAL (OUTPATIENT)
Age: 67
End: 2017-12-27

## 2018-01-16 ENCOUNTER — RX RENEWAL (OUTPATIENT)
Age: 68
End: 2018-01-16

## 2018-01-18 ENCOUNTER — RX RENEWAL (OUTPATIENT)
Age: 68
End: 2018-01-18

## 2018-01-19 ENCOUNTER — RX RENEWAL (OUTPATIENT)
Age: 68
End: 2018-01-19

## 2018-01-19 ENCOUNTER — OTHER (OUTPATIENT)
Age: 68
End: 2018-01-19

## 2018-01-25 ENCOUNTER — APPOINTMENT (OUTPATIENT)
Dept: ORTHOPEDIC SURGERY | Facility: CLINIC | Age: 68
End: 2018-01-25
Payer: COMMERCIAL

## 2018-01-25 VITALS — DIASTOLIC BLOOD PRESSURE: 74 MMHG | SYSTOLIC BLOOD PRESSURE: 129 MMHG | HEART RATE: 70 BPM

## 2018-01-25 PROCEDURE — 99024 POSTOP FOLLOW-UP VISIT: CPT

## 2018-01-29 ENCOUNTER — OTHER (OUTPATIENT)
Age: 68
End: 2018-01-29

## 2018-02-28 ENCOUNTER — RX RENEWAL (OUTPATIENT)
Age: 68
End: 2018-02-28

## 2018-04-12 ENCOUNTER — APPOINTMENT (OUTPATIENT)
Dept: ORTHOPEDIC SURGERY | Facility: CLINIC | Age: 68
End: 2018-04-12
Payer: COMMERCIAL

## 2018-04-12 VITALS
DIASTOLIC BLOOD PRESSURE: 80 MMHG | SYSTOLIC BLOOD PRESSURE: 125 MMHG | HEIGHT: 61 IN | BODY MASS INDEX: 26.81 KG/M2 | WEIGHT: 142 LBS | HEART RATE: 75 BPM

## 2018-04-12 DIAGNOSIS — I83.93 ASYMPTOMATIC VARICOSE VEINS OF BILATERAL LOWER EXTREMITIES: ICD-10-CM

## 2018-04-12 PROCEDURE — 99214 OFFICE O/P EST MOD 30 MIN: CPT

## 2018-04-12 PROCEDURE — 73562 X-RAY EXAM OF KNEE 3: CPT | Mod: RT

## 2018-05-16 ENCOUNTER — RX RENEWAL (OUTPATIENT)
Age: 68
End: 2018-05-16

## 2018-06-22 ENCOUNTER — OTHER (OUTPATIENT)
Age: 68
End: 2018-06-22

## 2018-07-05 ENCOUNTER — APPOINTMENT (OUTPATIENT)
Dept: VASCULAR SURGERY | Facility: CLINIC | Age: 68
End: 2018-07-05

## 2018-07-16 PROBLEM — M34.1 CR(E)ST SYNDROME: Chronic | Status: ACTIVE | Noted: 2017-11-21

## 2018-11-16 PROBLEM — C50.919 MALIGNANT NEOPLASM OF UNSPECIFIED SITE OF UNSPECIFIED FEMALE BREAST: Chronic | Status: ACTIVE | Noted: 2017-11-21

## 2018-11-16 PROBLEM — M17.11 UNILATERAL PRIMARY OSTEOARTHRITIS, RIGHT KNEE: Chronic | Status: ACTIVE | Noted: 2017-11-21

## 2018-11-16 PROBLEM — J32.9 CHRONIC SINUSITIS, UNSPECIFIED: Chronic | Status: ACTIVE | Noted: 2017-11-21

## 2018-11-16 PROBLEM — I89.0 LYMPHEDEMA, NOT ELSEWHERE CLASSIFIED: Chronic | Status: ACTIVE | Noted: 2017-11-21

## 2018-11-16 PROBLEM — N30.20 OTHER CHRONIC CYSTITIS WITHOUT HEMATURIA: Chronic | Status: ACTIVE | Noted: 2017-11-21

## 2018-11-16 PROBLEM — M34.9 SYSTEMIC SCLEROSIS, UNSPECIFIED: Chronic | Status: ACTIVE | Noted: 2017-11-21

## 2018-11-16 PROBLEM — I83.90 ASYMPTOMATIC VARICOSE VEINS OF UNSPECIFIED LOWER EXTREMITY: Chronic | Status: ACTIVE | Noted: 2017-11-21

## 2018-11-16 PROBLEM — E03.9 HYPOTHYROIDISM, UNSPECIFIED: Chronic | Status: ACTIVE | Noted: 2017-11-21

## 2018-12-13 ENCOUNTER — APPOINTMENT (OUTPATIENT)
Dept: ORTHOPEDIC SURGERY | Facility: CLINIC | Age: 68
End: 2018-12-13
Payer: COMMERCIAL

## 2018-12-13 VITALS — HEART RATE: 74 BPM | SYSTOLIC BLOOD PRESSURE: 128 MMHG | DIASTOLIC BLOOD PRESSURE: 74 MMHG

## 2018-12-13 PROCEDURE — 73562 X-RAY EXAM OF KNEE 3: CPT | Mod: RT

## 2018-12-13 PROCEDURE — 99212 OFFICE O/P EST SF 10 MIN: CPT

## 2019-01-12 ENCOUNTER — TRANSCRIPTION ENCOUNTER (OUTPATIENT)
Age: 69
End: 2019-01-12

## 2019-05-10 ENCOUNTER — TRANSCRIPTION ENCOUNTER (OUTPATIENT)
Age: 69
End: 2019-05-10

## 2019-05-12 ENCOUNTER — TRANSCRIPTION ENCOUNTER (OUTPATIENT)
Age: 69
End: 2019-05-12

## 2019-11-15 ENCOUNTER — FORM ENCOUNTER (OUTPATIENT)
Age: 69
End: 2019-11-15

## 2020-01-01 NOTE — OCCUPATIONAL THERAPY INITIAL EVALUATION ADULT - NS ASR WT BEARING DETAIL RLE
"Father of baby came to desk and told me that baby was spitting up. I walked in and saw mom holding the baby and clear fluid on mom's chest and baby's blanket. I picked baby up and took him to the warmer. He started gagging and spit up more clear fluid. I changed his blanket and swaddled him and explained to the parents that this sometimes happens with  babies and that he has fluid on his belly that he will have to spit up. I informed them that he could continue to spit up throughout the night and showed them how to prop the head of the crib up.   After swaddling baby, I noticed that he was starting to \"sing\". He stopped shortly after starting. I explained to the parents that if he continues to make that noise then NICU will have to come examine him. They acknowledged that they understood. I passed this information along to KEVIN Levy RN after transferring mom and baby to the postpartum unit.  "
Baby 
Md Dhillon) called multiple times on hospital phone, switch board called MD no answer, voicemail left per switch board. Nelia Costello called, no answer. Pt admitted to NICU. Report given to Christa Zuñiga.  
Mother of pt called out from room stating baby was choking. Pt was pink, warm and spitty upon entering room. Mother of pt stated he turned blue choking on clear fluid. Pt taken to nursery for assessment. Stats on room air 100%, blood sugar 68, Jitters noted. Respirations WLD , Pt grunting, NICU called for consult.   
Vitamin k  was given at 2112 in the right quadricep. Upon sticking the needle into the leg, a bump swelled at injection site. Knot went away after needle was removed. Will continue to monitor.  
weight-bearing as tolerated

## 2020-01-07 ENCOUNTER — TRANSCRIPTION ENCOUNTER (OUTPATIENT)
Age: 70
End: 2020-01-07

## 2020-01-24 ENCOUNTER — TRANSCRIPTION ENCOUNTER (OUTPATIENT)
Age: 70
End: 2020-01-24

## 2020-04-19 ENCOUNTER — FORM ENCOUNTER (OUTPATIENT)
Age: 70
End: 2020-04-19

## 2020-05-20 ENCOUNTER — TRANSCRIPTION ENCOUNTER (OUTPATIENT)
Age: 70
End: 2020-05-20

## 2020-09-16 ENCOUNTER — TRANSCRIPTION ENCOUNTER (OUTPATIENT)
Age: 70
End: 2020-09-16

## 2020-11-03 ENCOUNTER — FORM ENCOUNTER (OUTPATIENT)
Age: 70
End: 2020-11-03

## 2020-11-12 ENCOUNTER — APPOINTMENT (OUTPATIENT)
Dept: ORTHOPEDIC SURGERY | Facility: CLINIC | Age: 70
End: 2020-11-12
Payer: COMMERCIAL

## 2020-11-12 VITALS — WEIGHT: 148 LBS | HEIGHT: 61 IN | BODY MASS INDEX: 27.94 KG/M2

## 2020-11-12 DIAGNOSIS — M19.039 PRIMARY OSTEOARTHRITIS, UNSPECIFIED WRIST: ICD-10-CM

## 2020-11-12 DIAGNOSIS — M19.011 PRIMARY OSTEOARTHRITIS, RIGHT SHOULDER: ICD-10-CM

## 2020-11-12 PROCEDURE — 99214 OFFICE O/P EST MOD 30 MIN: CPT

## 2020-11-12 PROCEDURE — 73110 X-RAY EXAM OF WRIST: CPT | Mod: RT

## 2020-11-12 PROCEDURE — 99072 ADDL SUPL MATRL&STAF TM PHE: CPT

## 2020-11-12 PROCEDURE — 73030 X-RAY EXAM OF SHOULDER: CPT | Mod: RT

## 2020-11-12 NOTE — HISTORY OF PRESENT ILLNESS
[de-identified] : 70 year old RHD female presents complaining of right arm pain. 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 She has had lymph nodes removed from her right side to try to control the swelling. Her right shoulder, wrist and hand have been stiff and uncomfortable for the past 40 years or so. She has pain when she sleeps due to various issues and not just her shoulder. She denies significant neck pain or stiffness. She takes Diclofenac when her swelling is severe, and Motrin when it is not as severe. She notes that she exercises frequently to keep up her strength. She has not had treatment other than treatment for her CREST syndrome and rheumatoid issues. She is not on steroids. Pmhx: She is 3 years s/p right TKR done 12/2017 with Dr. Farah. She notes a hx of breast cancer and lymph node excision. .

## 2020-11-12 NOTE — DISCUSSION/SUMMARY
[de-identified] : I discussed the underlying pathophysiology of the patient's condition in great detail with the patient. I went over the patient's x-rays with them in great detail. The extent of the patient’s arthritis was discussed in great detail with them. We discussed the use of ice, Tylenol and anti-inflammatories to relieve pain. At-home strengthening and stretching exercises were discussed and demonstrated with the patient.  I recommended that she uses a Thera-band to practice exercises, and supplied her with a blue one. She should also purchase an over-the-door pulley for shoulder exercises. We went over the wide ranging benefits of exercise for the patient health and I encouraged her to begin a diligent exercise program. She should focus on light weight and high repetition exercises. She should avoid any heavy lifting, carrying, or overhead activities. She should not lift anything higher than 90° past her shoulder level. All of her questions were answered. She understands and consents to the plan. \par \par FU PRN.\par after following a diligent HEP.

## 2020-11-12 NOTE — CONSULT LETTER
[Dear  ___] : Dear  [unfilled], [Consult Letter:] : I had the pleasure of evaluating your patient, [unfilled]. [Please see my note below.] : Please see my note below. [Consult Closing:] : Thank you very much for allowing me to participate in the care of this patient.  If you have any questions, please do not hesitate to contact me. [Sincerely,] : Sincerely, [FreeTextEntry3] : Constantine Magaña M.D.

## 2020-11-12 NOTE — ADDENDUM
[FreeTextEntry1] : I, Geoff Bennett, acted solely as a scribe for Dr. Constantine Magaña on this date 11/12/2020.\par All medical record entries made by the Scribe were at my, Dr. Constantine Magaña, direction and personally dictated by me on 11/12/2020. I have reviewed the chart and agree that the record accurately reflects my personal performance of the history, physical exam, assessment and plan. I have also personally directed, reviewed, and agreed with the chart.

## 2020-11-12 NOTE — REASON FOR VISIT
[Follow-Up Visit] : a follow-up visit for [Shoulder Pain] : shoulder pain [FreeTextEntry2] : right arm pain

## 2020-11-12 NOTE — PHYSICAL EXAM
[de-identified] : Constitutional\par o Appearance : well-nourished, well developed, alert, in no acute distress \par Head and Face\par o Head :\par ¦ Inspection : atraumatic, normocephalic\par o Face :\par ¦ Inspection : no visible rash or discoloration\par Respiratory\par o Respiratory Effort: breathing unlabored \par Neurologic\par o Sensation : Normal sensation \par Psychiatric\par o Mood and Affect: mood normal, affect appropriate \par Lymphatic\par o Additional Nodes : No palpable lymph nodes present \par \par o Cervical Spine\par ¦ Inspection/Palpation : alignment midline, normal degree of lordosis present, no tenderness. \par ¦ Range of Motion : full flexion and extension, slight limitation of rotation to the right\par ¦ Skin : normal appearance, no masses or tenderness, trachea midline\par Tests: Negative Spurling’s test\par \par Right Upper Extremity\par o Right Shoulder :\par ¦ Inspection/Palpation : anterior capsular tenderness, no swelling or deformities\par ¦ Range of Motion : full in all planes with mild discomfort at the extremes, no crepitance\par ¦ Strength :  forward elevation, internal rotation, external rotation, external rotation at 90 degrees of abduction, supraspinatus, adduction and abduction, 5/5 in all arcs, biceps/triceps 5/5\par ¦ Stability : no joint instability on provocative testing \par Tests: Kahn negative, Neer test negative, Yoli test negative, drop arm test negative\par \par o Right Wrist:\par ¦ Inspection/Palpation : basal joint tenderness, mild swelling about the wrist and forearm, hypertrophy of the basal joint of the thumb\par ¦ Range of Motion : full dorsi and volar flexion, full pronation and supination, discomfort at the extremes of all motion, no crepitance\par ¦ Strength : extension, flexion, ulnar deviation and radial deviation 5/5\par ¦ Stability : no joint instability on provocative testing\par ¦ Tests/Signs : Tinel's sign negative over carpal tunnel , negative Phalen’s, negative Finkelstein’s test \par \par o Right Hand :\par ¦ Inspection/Palpation : no tenderness to palpation or pain with axial compression, mild swelling about the fingers\par ¦ Range of Motion : full arc of motion in the small joints of the hand, no discomfort elicited\par ¦ Strength : all intrinsic and extrinsic hand muscles 5/5\par ¦ Stability : no joint instability on provocative testing\par o Sensation : sensation intact to light touch\par o Skin : no skin lesions or discoloration \par \par Left Upper Extremity\par o Left Shoulder :\par ¦ Inspection/Palpation : no tenderness, no swelling or deformities\par ¦ Range of Motion : full and painless in all planes, no crepitance\par ¦ Strength :  forward elevation, internal rotation 5/5, external rotation 5/5, external rotation at 90 degrees of abduction, supraspinatus, adduction and abduction, biceps/triceps, 5/5\par ¦ Stability : no joint instability on provocative testing\par  Tests: Kahn negative, Neer test negative, Yoli test negative, drop arm test negative\par \par o Left Wrist:\par ¦ Inspection/Palpation : no tenderness, swelling or deformities\par ¦ Range of Motion : full and painless in all planes, no crepitance\par ¦ Strength : extension, flexion, ulnar deviation and radial deviation 5/5\par ¦ Stability : no joint instability on provocative testing\par ¦ Tests/Signs : Tinel's sign negative over carpal tunnel, negative Phalen’s, negative Finkelstein’s test \par \par o Left Hand :\par ¦ Inspection/Palpation : no tenderness to palpation or pain with axial compression\par ¦ Range of Motion : full arc of motion in the small joints of the hand, no discomfort elicited\par ¦ Strength : all intrinsic and extrinsic hand muscles 5/5\par ¦ Stability : no joint instability on provocative testing \par o Sensation : sensation intact to light touch\par o Skin : no skin lesions or discoloration \par \par Gait and Station:\par Gait: gait normal, no significant extremity swelling or lymphedema, good proprioception and balance\par \par Radiology Results \par o Right Shoulder : AP internal/external rotation and outlet views were obtained and revealed moderate degenerative arthritis of the AC joint with mild narrowing of the glenohumeral joint.\par o Right Wrist : AP, lateral and oblique views were obtained and revealed moderate to severe arthritis of the basal joint of the thumb, mild to moderate arthritis of the radiocarpal joint.

## 2021-01-28 NOTE — DISCHARGE NOTE ADULT - HOME CARE AGENCY
tympanic
[facility], [place]   The nurse and physical therapist will call you the day after discharge to treat your in your home.  If you do not hear from them by 12 noon the day after discharge, please call [facility] at the number provided.

## 2021-04-25 ENCOUNTER — FORM ENCOUNTER (OUTPATIENT)
Age: 71
End: 2021-04-25

## 2021-05-20 NOTE — PATIENT PROFILE ADULT. - WHEN FALL OCCURRED
Clinic Documentation      Education Provided:  [x] Review of Jackquline Nora  [x] Agreement Review  [x] PEG Score Calculated [x] PHQ Score Calculated [x] ORT Score Calculated    [] Compliance Issues Discussed [] Cognitive Behavior Needs [x] Exercise [] Review of Test [] Financial Issues  [x] Tobacco/Alcohol Use Reviewed [x] Teaching [] New Patient [] Picture Obtained    Physician Plan:  [] Outgoing Referral  [] Pharmacy Consult  [] Test Ordered [x] Prescription Ordered/Changed   [] Obtained Test Results / Consult Notes        Complete if patient is withholding blood thinner for procedure     Blood Thinner Patient is currently taking:      [] Plavix (Hold for 7 days)  [] Aspirin (Hold for 5 days)     [] Pletal (Hold for 2 days)  [] Pradaxa (Hold for 3 days)    [] Effient (Hold for 7 days)  [] Xarelto (Hold for 2 days)    [] Eliquis (Hold for 2 days)  [] Brilinta (Hold for 7 days)    [] Coumadin (Hold for 5 days) - (INR needs to be drawn the day prior to procedure- INR < 2.0)    [] Aggrenox (Hold for 7 days)        [] Patient will stop medication on their own.    [] Blood Thinner Form Faxed for approval to hold.    Provider form faxed to:     Assessment Completed by:  Electronically signed by Minnie Aponte RN on 5/20/2021 at 10:38 AM this admission

## 2021-07-31 ENCOUNTER — TRANSCRIPTION ENCOUNTER (OUTPATIENT)
Age: 71
End: 2021-07-31

## 2021-11-20 ENCOUNTER — INPATIENT (INPATIENT)
Facility: HOSPITAL | Age: 71
LOS: 2 days | Discharge: HOME | DRG: 872 | End: 2021-11-23
Attending: TRANSPLANT SURGERY | Admitting: TRANSPLANT SURGERY
Payer: COMMERCIAL

## 2021-11-20 VITALS
OXYGEN SATURATION: 98 % | HEART RATE: 118 BPM | RESPIRATION RATE: 18 BRPM | TEMPERATURE: 98 F | HEIGHT: 61 IN | WEIGHT: 154.98 LBS | SYSTOLIC BLOOD PRESSURE: 136 MMHG | DIASTOLIC BLOOD PRESSURE: 71 MMHG

## 2021-11-20 DIAGNOSIS — K35.80 UNSPECIFIED ACUTE APPENDICITIS: ICD-10-CM

## 2021-11-20 DIAGNOSIS — Z98.890 OTHER SPECIFIED POSTPROCEDURAL STATES: Chronic | ICD-10-CM

## 2021-11-20 DIAGNOSIS — Z92.3 PERSONAL HISTORY OF IRRADIATION: Chronic | ICD-10-CM

## 2021-11-20 LAB
ALBUMIN SERPL ELPH-MCNC: 4.1 G/DL — SIGNIFICANT CHANGE UP (ref 3.3–5)
ALP SERPL-CCNC: 77 U/L — SIGNIFICANT CHANGE UP (ref 40–120)
ALT FLD-CCNC: 17 U/L — SIGNIFICANT CHANGE UP (ref 10–45)
ANION GAP SERPL CALC-SCNC: 13 MMOL/L — SIGNIFICANT CHANGE UP (ref 5–17)
APPEARANCE UR: CLEAR — SIGNIFICANT CHANGE UP
APTT BLD: 31 SEC — SIGNIFICANT CHANGE UP (ref 27.5–35.5)
AST SERPL-CCNC: 19 U/L — SIGNIFICANT CHANGE UP (ref 10–40)
BACTERIA # UR AUTO: ABNORMAL
BASE EXCESS BLDV CALC-SCNC: -3.9 MMOL/L — LOW (ref -2–2)
BASE EXCESS BLDV CALC-SCNC: -4.5 MMOL/L — LOW (ref -2–2)
BASOPHILS # BLD AUTO: 0.04 K/UL — SIGNIFICANT CHANGE UP (ref 0–0.2)
BASOPHILS NFR BLD AUTO: 0.2 % — SIGNIFICANT CHANGE UP (ref 0–2)
BILIRUB SERPL-MCNC: 0.5 MG/DL — SIGNIFICANT CHANGE UP (ref 0.2–1.2)
BILIRUB UR-MCNC: NEGATIVE — SIGNIFICANT CHANGE UP
BLD GP AB SCN SERPL QL: NEGATIVE — SIGNIFICANT CHANGE UP
BUN SERPL-MCNC: 11 MG/DL — SIGNIFICANT CHANGE UP (ref 7–23)
CA-I SERPL-SCNC: 1.27 MMOL/L — SIGNIFICANT CHANGE UP (ref 1.15–1.33)
CA-I SERPL-SCNC: 1.34 MMOL/L — HIGH (ref 1.15–1.33)
CALCIUM SERPL-MCNC: 9.9 MG/DL — SIGNIFICANT CHANGE UP (ref 8.4–10.5)
CHLORIDE BLDV-SCNC: 104 MMOL/L — SIGNIFICANT CHANGE UP (ref 96–108)
CHLORIDE BLDV-SCNC: 106 MMOL/L — SIGNIFICANT CHANGE UP (ref 96–108)
CHLORIDE SERPL-SCNC: 105 MMOL/L — SIGNIFICANT CHANGE UP (ref 96–108)
CO2 BLDV-SCNC: 22 MMOL/L — SIGNIFICANT CHANGE UP (ref 22–26)
CO2 BLDV-SCNC: 23 MMOL/L — SIGNIFICANT CHANGE UP (ref 22–26)
CO2 SERPL-SCNC: 18 MMOL/L — LOW (ref 22–31)
COLOR SPEC: ABNORMAL
CREAT SERPL-MCNC: 0.73 MG/DL — SIGNIFICANT CHANGE UP (ref 0.5–1.3)
DIFF PNL FLD: NEGATIVE — SIGNIFICANT CHANGE UP
EOSINOPHIL # BLD AUTO: 0.02 K/UL — SIGNIFICANT CHANGE UP (ref 0–0.5)
EOSINOPHIL NFR BLD AUTO: 0.1 % — SIGNIFICANT CHANGE UP (ref 0–6)
EPI CELLS # UR: 2 /HPF — SIGNIFICANT CHANGE UP
GAS PNL BLDV: 133 MMOL/L — LOW (ref 136–145)
GAS PNL BLDV: 134 MMOL/L — LOW (ref 136–145)
GAS PNL BLDV: SIGNIFICANT CHANGE UP
GLUCOSE BLDV-MCNC: 100 MG/DL — HIGH (ref 70–99)
GLUCOSE BLDV-MCNC: 117 MG/DL — HIGH (ref 70–99)
GLUCOSE SERPL-MCNC: 119 MG/DL — HIGH (ref 70–99)
GLUCOSE UR QL: NEGATIVE — SIGNIFICANT CHANGE UP
HCO3 BLDV-SCNC: 21 MMOL/L — LOW (ref 22–29)
HCO3 BLDV-SCNC: 22 MMOL/L — SIGNIFICANT CHANGE UP (ref 22–29)
HCT VFR BLD CALC: 37.6 % — SIGNIFICANT CHANGE UP (ref 34.5–45)
HCT VFR BLDA CALC: 33 % — LOW (ref 34.5–46.5)
HCT VFR BLDA CALC: 34 % — LOW (ref 34.5–46.5)
HGB BLD CALC-MCNC: 11 G/DL — LOW (ref 11.7–16.1)
HGB BLD CALC-MCNC: 11.2 G/DL — LOW (ref 11.7–16.1)
HGB BLD-MCNC: 11.5 G/DL — SIGNIFICANT CHANGE UP (ref 11.5–15.5)
HYALINE CASTS # UR AUTO: 0 /LPF — SIGNIFICANT CHANGE UP (ref 0–2)
IMM GRANULOCYTES NFR BLD AUTO: 0.6 % — SIGNIFICANT CHANGE UP (ref 0–1.5)
INR BLD: 1.24 RATIO — HIGH (ref 0.88–1.16)
KETONES UR-MCNC: NEGATIVE — SIGNIFICANT CHANGE UP
LACTATE BLDV-MCNC: 0.8 MMOL/L — SIGNIFICANT CHANGE UP (ref 0.7–2)
LACTATE BLDV-MCNC: 2.1 MMOL/L — HIGH (ref 0.7–2)
LEUKOCYTE ESTERASE UR-ACNC: NEGATIVE — SIGNIFICANT CHANGE UP
LIDOCAIN IGE QN: 18 U/L — SIGNIFICANT CHANGE UP (ref 7–60)
LYMPHOCYTES # BLD AUTO: 0.85 K/UL — LOW (ref 1–3.3)
LYMPHOCYTES # BLD AUTO: 5 % — LOW (ref 13–44)
MCHC RBC-ENTMCNC: 28.2 PG — SIGNIFICANT CHANGE UP (ref 27–34)
MCHC RBC-ENTMCNC: 30.6 GM/DL — LOW (ref 32–36)
MCV RBC AUTO: 92.2 FL — SIGNIFICANT CHANGE UP (ref 80–100)
MONOCYTES # BLD AUTO: 1.16 K/UL — HIGH (ref 0–0.9)
MONOCYTES NFR BLD AUTO: 6.9 % — SIGNIFICANT CHANGE UP (ref 2–14)
NEUTROPHILS # BLD AUTO: 14.75 K/UL — HIGH (ref 1.8–7.4)
NEUTROPHILS NFR BLD AUTO: 87.2 % — HIGH (ref 43–77)
NITRITE UR-MCNC: NEGATIVE — SIGNIFICANT CHANGE UP
NRBC # BLD: 0 /100 WBCS — SIGNIFICANT CHANGE UP (ref 0–0)
PCO2 BLDV: 40 MMHG — SIGNIFICANT CHANGE UP (ref 39–42)
PCO2 BLDV: 40 MMHG — SIGNIFICANT CHANGE UP (ref 39–42)
PH BLDV: 7.33 — SIGNIFICANT CHANGE UP (ref 7.32–7.43)
PH BLDV: 7.34 — SIGNIFICANT CHANGE UP (ref 7.32–7.43)
PH UR: 7 — SIGNIFICANT CHANGE UP (ref 5–8)
PLATELET # BLD AUTO: 278 K/UL — SIGNIFICANT CHANGE UP (ref 150–400)
PO2 BLDV: 30 MMHG — SIGNIFICANT CHANGE UP (ref 25–45)
PO2 BLDV: 42 MMHG — SIGNIFICANT CHANGE UP (ref 25–45)
POTASSIUM BLDV-SCNC: 3.6 MMOL/L — SIGNIFICANT CHANGE UP (ref 3.5–5.1)
POTASSIUM BLDV-SCNC: 4.2 MMOL/L — SIGNIFICANT CHANGE UP (ref 3.5–5.1)
POTASSIUM SERPL-MCNC: 3.9 MMOL/L — SIGNIFICANT CHANGE UP (ref 3.5–5.3)
POTASSIUM SERPL-SCNC: 3.9 MMOL/L — SIGNIFICANT CHANGE UP (ref 3.5–5.3)
PROT SERPL-MCNC: 7.1 G/DL — SIGNIFICANT CHANGE UP (ref 6–8.3)
PROT UR-MCNC: SIGNIFICANT CHANGE UP
PROTHROM AB SERPL-ACNC: 14.7 SEC — HIGH (ref 10.6–13.6)
RBC # BLD: 4.08 M/UL — SIGNIFICANT CHANGE UP (ref 3.8–5.2)
RBC # FLD: 14 % — SIGNIFICANT CHANGE UP (ref 10.3–14.5)
RBC CASTS # UR COMP ASSIST: 6 /HPF — HIGH (ref 0–4)
RH IG SCN BLD-IMP: POSITIVE — SIGNIFICANT CHANGE UP
SAO2 % BLDV: 43.3 % — LOW (ref 67–88)
SAO2 % BLDV: 69.3 % — SIGNIFICANT CHANGE UP (ref 67–88)
SARS-COV-2 RNA SPEC QL NAA+PROBE: SIGNIFICANT CHANGE UP
SODIUM SERPL-SCNC: 136 MMOL/L — SIGNIFICANT CHANGE UP (ref 135–145)
SP GR SPEC: 1.02 — SIGNIFICANT CHANGE UP (ref 1.01–1.02)
UROBILINOGEN FLD QL: NEGATIVE — SIGNIFICANT CHANGE UP
WBC # BLD: 16.92 K/UL — HIGH (ref 3.8–10.5)
WBC # FLD AUTO: 16.92 K/UL — HIGH (ref 3.8–10.5)
WBC UR QL: 3 /HPF — SIGNIFICANT CHANGE UP (ref 0–5)

## 2021-11-20 PROCEDURE — G1004: CPT

## 2021-11-20 PROCEDURE — 99221 1ST HOSP IP/OBS SF/LOW 40: CPT

## 2021-11-20 PROCEDURE — 99285 EMERGENCY DEPT VISIT HI MDM: CPT

## 2021-11-20 PROCEDURE — 74177 CT ABD & PELVIS W/CONTRAST: CPT | Mod: 26,ME

## 2021-11-20 PROCEDURE — 93010 ELECTROCARDIOGRAM REPORT: CPT

## 2021-11-20 RX ORDER — LEVOTHYROXINE SODIUM 125 MCG
125 TABLET ORAL DAILY
Refills: 0 | Status: DISCONTINUED | OUTPATIENT
Start: 2021-11-20 | End: 2021-11-23

## 2021-11-20 RX ORDER — ENOXAPARIN SODIUM 100 MG/ML
40 INJECTION SUBCUTANEOUS EVERY 24 HOURS
Refills: 0 | Status: DISCONTINUED | OUTPATIENT
Start: 2021-11-20 | End: 2021-11-23

## 2021-11-20 RX ORDER — PIPERACILLIN AND TAZOBACTAM 4; .5 G/20ML; G/20ML
3.38 INJECTION, POWDER, LYOPHILIZED, FOR SOLUTION INTRAVENOUS EVERY 8 HOURS
Refills: 0 | Status: DISCONTINUED | OUTPATIENT
Start: 2021-11-21 | End: 2021-11-23

## 2021-11-20 RX ORDER — MOMETASONE FUROATE 50 UG/1
2 SPRAY NASAL
Qty: 0 | Refills: 0 | DISCHARGE

## 2021-11-20 RX ORDER — SODIUM CHLORIDE 9 MG/ML
1000 INJECTION, SOLUTION INTRAVENOUS
Refills: 0 | Status: DISCONTINUED | OUTPATIENT
Start: 2021-11-20 | End: 2021-11-22

## 2021-11-20 RX ORDER — SODIUM CHLORIDE 9 MG/ML
1000 INJECTION INTRAMUSCULAR; INTRAVENOUS; SUBCUTANEOUS
Refills: 0 | Status: DISCONTINUED | OUTPATIENT
Start: 2021-11-20 | End: 2021-11-21

## 2021-11-20 RX ORDER — SODIUM CHLORIDE 9 MG/ML
500 INJECTION INTRAMUSCULAR; INTRAVENOUS; SUBCUTANEOUS ONCE
Refills: 0 | Status: COMPLETED | OUTPATIENT
Start: 2021-11-20 | End: 2021-11-20

## 2021-11-20 RX ORDER — HYDROMORPHONE HYDROCHLORIDE 2 MG/ML
0.5 INJECTION INTRAMUSCULAR; INTRAVENOUS; SUBCUTANEOUS ONCE
Refills: 0 | Status: DISCONTINUED | OUTPATIENT
Start: 2021-11-20 | End: 2021-11-20

## 2021-11-20 RX ORDER — OMEPRAZOLE 10 MG/1
1 CAPSULE, DELAYED RELEASE ORAL
Qty: 0 | Refills: 0 | DISCHARGE

## 2021-11-20 RX ORDER — PIPERACILLIN AND TAZOBACTAM 4; .5 G/20ML; G/20ML
3.38 INJECTION, POWDER, LYOPHILIZED, FOR SOLUTION INTRAVENOUS ONCE
Refills: 0 | Status: COMPLETED | OUTPATIENT
Start: 2021-11-20 | End: 2021-11-20

## 2021-11-20 RX ORDER — PHENAZOPYRIDINE HCL 100 MG
1 TABLET ORAL
Qty: 0 | Refills: 0 | DISCHARGE

## 2021-11-20 RX ORDER — PANTOPRAZOLE SODIUM 20 MG/1
40 TABLET, DELAYED RELEASE ORAL
Refills: 0 | Status: DISCONTINUED | OUTPATIENT
Start: 2021-11-20 | End: 2021-11-21

## 2021-11-20 RX ORDER — CEFTRIAXONE 500 MG/1
1000 INJECTION, POWDER, FOR SOLUTION INTRAMUSCULAR; INTRAVENOUS ONCE
Refills: 0 | Status: COMPLETED | OUTPATIENT
Start: 2021-11-20 | End: 2021-11-20

## 2021-11-20 RX ORDER — ATORVASTATIN CALCIUM 80 MG/1
20 TABLET, FILM COATED ORAL AT BEDTIME
Refills: 0 | Status: DISCONTINUED | OUTPATIENT
Start: 2021-11-20 | End: 2021-11-21

## 2021-11-20 RX ADMIN — PIPERACILLIN AND TAZOBACTAM 25 GRAM(S): 4; .5 INJECTION, POWDER, LYOPHILIZED, FOR SOLUTION INTRAVENOUS at 23:40

## 2021-11-20 RX ADMIN — HYDROMORPHONE HYDROCHLORIDE 0.5 MILLIGRAM(S): 2 INJECTION INTRAMUSCULAR; INTRAVENOUS; SUBCUTANEOUS at 16:51

## 2021-11-20 RX ADMIN — HYDROMORPHONE HYDROCHLORIDE 0.5 MILLIGRAM(S): 2 INJECTION INTRAMUSCULAR; INTRAVENOUS; SUBCUTANEOUS at 21:53

## 2021-11-20 RX ADMIN — HYDROMORPHONE HYDROCHLORIDE 0.5 MILLIGRAM(S): 2 INJECTION INTRAMUSCULAR; INTRAVENOUS; SUBCUTANEOUS at 21:14

## 2021-11-20 RX ADMIN — CEFTRIAXONE 100 MILLIGRAM(S): 500 INJECTION, POWDER, FOR SOLUTION INTRAMUSCULAR; INTRAVENOUS at 19:42

## 2021-11-20 RX ADMIN — SODIUM CHLORIDE 500 MILLILITER(S): 9 INJECTION INTRAMUSCULAR; INTRAVENOUS; SUBCUTANEOUS at 21:53

## 2021-11-20 RX ADMIN — SODIUM CHLORIDE 75 MILLILITER(S): 9 INJECTION INTRAMUSCULAR; INTRAVENOUS; SUBCUTANEOUS at 21:19

## 2021-11-20 RX ADMIN — PIPERACILLIN AND TAZOBACTAM 200 GRAM(S): 4; .5 INJECTION, POWDER, LYOPHILIZED, FOR SOLUTION INTRAVENOUS at 19:58

## 2021-11-20 RX ADMIN — SODIUM CHLORIDE 500 MILLILITER(S): 9 INJECTION INTRAMUSCULAR; INTRAVENOUS; SUBCUTANEOUS at 18:07

## 2021-11-20 RX ADMIN — PIPERACILLIN AND TAZOBACTAM 3.38 GRAM(S): 4; .5 INJECTION, POWDER, LYOPHILIZED, FOR SOLUTION INTRAVENOUS at 21:53

## 2021-11-20 RX ADMIN — ATORVASTATIN CALCIUM 20 MILLIGRAM(S): 80 TABLET, FILM COATED ORAL at 23:41

## 2021-11-20 RX ADMIN — HYDROMORPHONE HYDROCHLORIDE 0.5 MILLIGRAM(S): 2 INJECTION INTRAMUSCULAR; INTRAVENOUS; SUBCUTANEOUS at 21:52

## 2021-11-20 RX ADMIN — ENOXAPARIN SODIUM 40 MILLIGRAM(S): 100 INJECTION SUBCUTANEOUS at 23:41

## 2021-11-20 NOTE — H&P ADULT - NSICDXPASTSURGICALHX_GEN_ALL_CORE_FT
PAST SURGICAL HISTORY:  H/O lumpectomy of right breast in 2005 with removal of 2 lymph nodes    S/P radiation therapy for 9 weeks

## 2021-11-20 NOTE — ED PROVIDER NOTE - OBJECTIVE STATEMENT
see attending attestation see attending attestation    Kishan Copeland,  PGY-2:  71F pmh CREST syndrome, breast ca s/p R mastectomy prseents to ED for RLQ abdominal pain x1 night, originally nonspecific but now concentrated in RLQ, no nv/f/c, + constipation unrelieved by laxatives, no prior abdominal surgeries, no urinary complaints.

## 2021-11-20 NOTE — H&P ADULT - NSHPPHYSICALEXAM_GEN_ALL_CORE
VITAL SIGNS:  ICU Vital Signs Last 24 Hrs  T(C): 37 (20 Nov 2021 19:46), Max: 37.1 (20 Nov 2021 15:24)  T(F): 98.6 (20 Nov 2021 19:46), Max: 98.8 (20 Nov 2021 15:24)  HR: 79 (20 Nov 2021 21:10) (79 - 118)  BP: 117/73 (20 Nov 2021 21:10) (102/67 - 136/71)  BP(mean): 88 (20 Nov 2021 21:10) (88 - 88)  ABP: --  ABP(mean): --  RR: 18 (20 Nov 2021 21:10) (18 - 20)  SpO2: 96% (20 Nov 2021 21:10) (94% - 98%)      PHYSICAL EXAMINATION:  General - well-nourished, no acute distress  Neuro - awake, alert, oriented x4, no acute focal deficits  HEENT - normocephalic, PERRL, moist mucous membranes  Lungs - breathing comfortably on room air  Heart - pulse regular  Abdomen - soft, nondistended, tender to palpation along right hemiabdomen  Extremities - all four extremities are warm & pink

## 2021-11-20 NOTE — H&P ADULT - HISTORY OF PRESENT ILLNESS
History of Present Illness  Ms. Nunez is a 71 year old woman with PMH breast cancer s/p mastectomy, CREST syndrome (not on any medications), chronic UTIs, presenting with one day of right lower quadrant pain. She reports a few days of malaise and feeling dehydrated, when she developed abdominal pain. She first attributed her pain to constipation, however had no relief from laxatives. She reports decreased appetite, but has not had nausea or vomiting. She denies fevers during this time. She has not had diarrhea or changes in urination.    Upon presentation to the ED, patient is afebrile, normotensive, and tachycardic. Labs notable for WBC 16 and lactate 2.2. She was given 500cc bolus with improvement in HR and lactate to 0.8. CTAP demonstrated 1.1 cm thickened appendix without abscess.

## 2021-11-20 NOTE — H&P ADULT - NSICDXFAMILYHX_GEN_ALL_CORE_FT
FAMILY HISTORY:  Mother  Still living? Unknown  Family history of heart attack, Age at diagnosis: Age Unknown    Grandparent  Still living? Unknown  Family history of breast cancer, Age at diagnosis: Age Unknown  Family history of pancreatic cancer, Age at diagnosis: Age Unknown

## 2021-11-20 NOTE — ED PROVIDER NOTE - PHYSICAL EXAMINATION
see attending attestation see attending attestation    Kishan Copeland DO PGY-2:  CONSTITUTIONAL: Uncomfortable appearing  HEAD: NCAT  ENT: normal pharynx with no erythema or exudates  NECK: Supple; non tender. Full ROM.  CARD: RRR, no murmurs.  RESP: clear to ausculation b/l. No crackles or wheezing.  ABD: soft, RLQ tenderness to palpation, +Rovsing, +psoas sign, non-distended, no rebound or guarding.  MSK: No pedal edema, no calf tenderness  PSYCH: Cooperative, appropriate.

## 2021-11-20 NOTE — H&P ADULT - NSHPLABSRESULTS_GEN_ALL_CORE
11.5   16.92 )-----------( 278      ( 20 Nov 2021 16:55 )             37.6       11-20    136  |  105  |  11  ----------------------------<  119<H>  3.9   |  18<L>  |  0.73    Ca    9.9      20 Nov 2021 16:55    TPro  7.1  /  Alb  4.1  /  TBili  0.5  /  DBili  x   /  AST  19  /  ALT  17  /  AlkPhos  77  11-20      PT/INR - ( 20 Nov 2021 20:01 )   PT: 14.7 sec;   INR: 1.24 ratio         PTT - ( 20 Nov 2021 20:01 )  PTT:31.0 sec        VBG - ( 20 Nov 2021 18:43 )  pH: 7.33  /  pCO2: 40    /  pO2: 30    / HCO3: 21    / Base Excess: -4.5  /  SaO2: 43.3   Lactate: 0.8          IMAGING STUDIES:  < from: CT Abdomen and Pelvis w/ Oral Cont and w/ IV Cont (11.20.21 @ 19:27) >      INTERPRETATION:  CLINICAL INFORMATION: Right-sided abdominal pain    COMPARISON: None.    CONTRAST/COMPLICATIONS:  IV Contrast: Omnipaque 350  90 cc administered   10 cc discarded  Oral Contrast: NONE  Complications: None reported at time of study completion    PROCEDURE:  CT of the Abdomen and Pelvis was performed.  Sagittal and coronal reformats were performed.    FINDINGS:  LOWER CHEST: Within normal limits.    LIVER: 1 cm hypodensity in the dome. 1.1 cm central hypodensity. Both too small to characterize  BILE DUCTS: Normal caliber.  GALLBLADDER: Within normal limits.  SPLEEN: Within normal limits.  PANCREAS: Within normal limits.  ADRENALS: Within normal limits.  KIDNEYS/URETERS: Punctate angiomyolipoma upper pole right kidney. No hydronephrosis or calculi. Small right renal cyst    BLADDER: Within normal limits.  REPRODUCTIVE ORGANS: Uterus and adnexa within normal limits.    BOWEL. Diverticulosis sigmoid colon. Appendix . Thickened appendix measuring up to 1.1 cm in size with surrounding inflammatory changes consistent with acute appendicitis. No abscess can be identified.  PERITONEUM: No ascites.  VESSELS: Within normal limits.  RETROPERITONEUM/LYMPH NODES: No lymphadenopathy.  ABDOMINAL WALL: Tiny right inguinal hernia with fluid.  BONES: Degenerative changes    IMPRESSION:  Radiographic findings consistent with acute appendicitis. No abscess can be identified.

## 2021-11-20 NOTE — ED PROVIDER NOTE - NS ED ROS FT
Kishan Copeland,  PGY-2:   Constitutional:  (-) fever, (-) chills, (-) lethargy  Eyes:  (-) eye pain (-) visual changes  ENMT: (-) nasal discharge, (-) sore throat. (-) neck pain or stiffness  Cardiac: (-) chest pain (-) palpitations  Respiratory:  (-) cough (-) respiratory distress.   GI:  (+) nausea (-) vomiting (-) diarrhea (+) abdominal pain.  :  (-) dysuria (-) frequency (-) burning.  MS:  (-) back pain (-) joint pain.  Neuro:  (-) headache (-) numbness (-) tingling (-) focal weakness  Skin:  (-) rash  Except as documented in the HPI,  all other systems are negative

## 2021-11-20 NOTE — ED PROVIDER NOTE - ATTENDING CONTRIBUTION TO CARE
RGUJRAL 72yo f hx CREST syndrome, Breast ca remote presents with abdominal pain since last night. Pain was diffuse initially now localized to RLQ. Denies any n/v. + constipation but passing flatus. No f/c. No prior abdominal surgery. No urinary complaints.   On exam, Patient is awake,alert,oriented x 3. Patient is well appearing and in no acute distress. Patient's chest is clear to ausculation, +s1s2. Abdomen is soft nd/ +diffuse abd pain, increased RLQ tender +BS. Extremity with no swelling or calf tenderness.   Check labs, CT/UA and eval for appendicitis.

## 2021-11-20 NOTE — ED ADULT NURSE NOTE - OBJECTIVE STATEMENT
71 yr old female to ED with H/O CREST syndrome and Breast CA (RT) . Pt c/o rt sided abd pain since last night. Denies chest pain or sob. Denies fever or chills Denies N/V. Has not had a BM, but pos Flatulence. Denies vag bleeding or rectal bleeding Denies burn or diff urinating. Denies rebound tenderness. NPO

## 2021-11-20 NOTE — H&P ADULT - ASSESSMENT
Ms. Nunez is a 71 year old woman with PMH breast cancer s/p mastectomy, CREST syndrome (not on any medications), chronic UTIs, presenting with acute appendicitis    Plan  - admit to surgery, Dr. Talley  - will treat with trial of IV antibiotics, if does not progress will consider surgical intervention. This was discussed with patient and daughter  - high risk of poor wound healing with calcinosis secondary to CREST syndrome if she undergoes surgery  - NPO/IVF  - continue Zosyn  - pain control with IV Tylenol, dilaudid while NPO  - VTE ppx with Lovenox    discussed with Dr. Mayank Trimble, PGY2  Acute Care Surgery  [phone]

## 2021-11-20 NOTE — H&P ADULT - ATTENDING COMMENTS
72 yo female with complex medical history including CREST syndrome. Presenting with acute appendicitis, no evidence of perforation or abscess formation.  - Abdominal exam with localized tenderness over McBurney's point, no peritoneal signs.  - Due to the presence of CREST syndrome, past history of poor wound healing and no evidence of peritonitis, perforation or abscess, will proceed with conservative management with IV antibiotics. I discussed the plan with Ms Nunez, her  and her daughter who is an internal medicine attending at [employer].  - Monitor WBC and T curve, serial abdominal exams.  - If worsening will take to OR for appendectomy.  - Multimodal pain management including parenteral hydromorphone.  - Admit to ACS service.  - Incidental finding of liver lesion and renal cyst, will follow up outpatient with PCP.

## 2021-11-20 NOTE — ED ADULT TRIAGE NOTE - BRAND OF FIRST COVID-19 BOOSTER
DATE: 06/18/2016



SUBJECTIVE:  The patient remains as assessed, no distress, no complaints, no fever, chills, nausea, v
omiting, diarrhea.  The patient is calm and cooperative, remains pending court date 6/30/2016 for rosie neal.



REVIEW OF SYSTEMS:  Negative.



OBJECTIVE FINDINGS:

GENERAL:  The patient alert and oriented to baseline.  

HEART:  Regular rate and rhythm.  No murmurs, rubs or gallops.

LUNGS:  Clear to auscultation bilaterally.

ABDOMEN:  Soft, nontender.  Bowel sounds x 4.

EXTREMITIES:  Distal pulses, motor sensation intact.  Cap refill is brisk.



DIAGNOSES AND PLAN:  Coronary artery disease status post cardiac arrest.  Continue medications.  Agit
ation related to neurocognitive disorder.  Continue Seroquel, Depakote.  Continue Ativan and Haldol a
s needed for agitation.  Smoking cessation, Nicoderm patch.  Deep venous thrombosis prophylaxis, sequ
ential compression devices and antiembolism hose, ambulation.  Low back pain, ibuprofen p.r.n.  Gluco
se intolerance, dietary control.  We will continue to monitor patient for safe discharge planning and
 guardianship.





__________________________________________

Fan SILVER







cc:



DD: 06/18/2016 08:29:00  [id number]

TT: 06/18/2016 09:07:08

Confirmation # [id number]

Dictation # [id number]

ln [employer]

## 2021-11-21 LAB
ANION GAP SERPL CALC-SCNC: 15 MMOL/L — SIGNIFICANT CHANGE UP (ref 5–17)
BUN SERPL-MCNC: 9 MG/DL — SIGNIFICANT CHANGE UP (ref 7–23)
CALCIUM SERPL-MCNC: 9.1 MG/DL — SIGNIFICANT CHANGE UP (ref 8.4–10.5)
CHLORIDE SERPL-SCNC: 108 MMOL/L — SIGNIFICANT CHANGE UP (ref 96–108)
CO2 SERPL-SCNC: 16 MMOL/L — LOW (ref 22–31)
CREAT SERPL-MCNC: 0.78 MG/DL — SIGNIFICANT CHANGE UP (ref 0.5–1.3)
CULTURE RESULTS: SIGNIFICANT CHANGE UP
GLUCOSE SERPL-MCNC: 109 MG/DL — HIGH (ref 70–99)
HCT VFR BLD CALC: 33 % — LOW (ref 34.5–45)
HCV AB S/CO SERPL IA: 0.06 S/CO — SIGNIFICANT CHANGE UP (ref 0–0.99)
HCV AB SERPL-IMP: SIGNIFICANT CHANGE UP
HGB BLD-MCNC: 10.1 G/DL — LOW (ref 11.5–15.5)
MAGNESIUM SERPL-MCNC: 1.8 MG/DL — SIGNIFICANT CHANGE UP (ref 1.6–2.6)
MCHC RBC-ENTMCNC: 28.3 PG — SIGNIFICANT CHANGE UP (ref 27–34)
MCHC RBC-ENTMCNC: 30.6 GM/DL — LOW (ref 32–36)
MCV RBC AUTO: 92.4 FL — SIGNIFICANT CHANGE UP (ref 80–100)
NRBC # BLD: 0 /100 WBCS — SIGNIFICANT CHANGE UP (ref 0–0)
PHOSPHATE SERPL-MCNC: 2.3 MG/DL — LOW (ref 2.5–4.5)
PLATELET # BLD AUTO: 231 K/UL — SIGNIFICANT CHANGE UP (ref 150–400)
POTASSIUM SERPL-MCNC: 3.6 MMOL/L — SIGNIFICANT CHANGE UP (ref 3.5–5.3)
POTASSIUM SERPL-SCNC: 3.6 MMOL/L — SIGNIFICANT CHANGE UP (ref 3.5–5.3)
RBC # BLD: 3.57 M/UL — LOW (ref 3.8–5.2)
RBC # FLD: 14.2 % — SIGNIFICANT CHANGE UP (ref 10.3–14.5)
SODIUM SERPL-SCNC: 139 MMOL/L — SIGNIFICANT CHANGE UP (ref 135–145)
SPECIMEN SOURCE: SIGNIFICANT CHANGE UP
WBC # BLD: 18.57 K/UL — HIGH (ref 3.8–10.5)
WBC # FLD AUTO: 18.57 K/UL — HIGH (ref 3.8–10.5)

## 2021-11-21 RX ORDER — HYDROMORPHONE HYDROCHLORIDE 2 MG/ML
0.5 INJECTION INTRAMUSCULAR; INTRAVENOUS; SUBCUTANEOUS EVERY 6 HOURS
Refills: 0 | Status: DISCONTINUED | OUTPATIENT
Start: 2021-11-21 | End: 2021-11-23

## 2021-11-21 RX ORDER — HYDROMORPHONE HYDROCHLORIDE 2 MG/ML
0.5 INJECTION INTRAMUSCULAR; INTRAVENOUS; SUBCUTANEOUS ONCE
Refills: 0 | Status: DISCONTINUED | OUTPATIENT
Start: 2021-11-21 | End: 2021-11-21

## 2021-11-21 RX ORDER — PANTOPRAZOLE SODIUM 20 MG/1
40 TABLET, DELAYED RELEASE ORAL
Refills: 0 | Status: DISCONTINUED | OUTPATIENT
Start: 2021-11-21 | End: 2021-11-23

## 2021-11-21 RX ORDER — LORATADINE 10 MG/1
10 TABLET ORAL DAILY
Refills: 0 | Status: DISCONTINUED | OUTPATIENT
Start: 2021-11-21 | End: 2021-11-23

## 2021-11-21 RX ORDER — PHENAZOPYRIDINE HCL 100 MG
100 TABLET ORAL THREE TIMES A DAY
Refills: 0 | Status: DISCONTINUED | OUTPATIENT
Start: 2021-11-21 | End: 2021-11-21

## 2021-11-21 RX ORDER — OXYMETAZOLINE HYDROCHLORIDE 0.5 MG/ML
1 SPRAY NASAL EVERY 12 HOURS
Refills: 0 | Status: DISCONTINUED | OUTPATIENT
Start: 2021-11-21 | End: 2021-11-23

## 2021-11-21 RX ORDER — TRAMADOL HYDROCHLORIDE 50 MG/1
50 TABLET ORAL EVERY 6 HOURS
Refills: 0 | Status: DISCONTINUED | OUTPATIENT
Start: 2021-11-21 | End: 2021-11-23

## 2021-11-21 RX ORDER — ATORVASTATIN CALCIUM 80 MG/1
20 TABLET, FILM COATED ORAL AT BEDTIME
Refills: 0 | Status: DISCONTINUED | OUTPATIENT
Start: 2021-11-21 | End: 2021-11-23

## 2021-11-21 RX ORDER — SODIUM CHLORIDE 0.65 %
1 AEROSOL, SPRAY (ML) NASAL
Refills: 0 | Status: DISCONTINUED | OUTPATIENT
Start: 2021-11-21 | End: 2021-11-23

## 2021-11-21 RX ORDER — FLUTICASONE PROPIONATE 50 MCG
2 SPRAY, SUSPENSION NASAL
Refills: 0 | Status: DISCONTINUED | OUTPATIENT
Start: 2021-11-21 | End: 2021-11-23

## 2021-11-21 RX ORDER — DICLOFENAC SODIUM 75 MG/1
75 TABLET, DELAYED RELEASE ORAL
Refills: 0 | Status: DISCONTINUED | OUTPATIENT
Start: 2021-11-21 | End: 2021-11-23

## 2021-11-21 RX ORDER — FLUTICASONE PROPIONATE 50 MCG
1 SPRAY, SUSPENSION NASAL EVERY 12 HOURS
Refills: 0 | Status: DISCONTINUED | OUTPATIENT
Start: 2021-11-21 | End: 2021-11-21

## 2021-11-21 RX ORDER — ACETAMINOPHEN 500 MG
750 TABLET ORAL EVERY 6 HOURS
Refills: 0 | Status: COMPLETED | OUTPATIENT
Start: 2021-11-21 | End: 2021-11-22

## 2021-11-21 RX ORDER — CHOLECALCIFEROL (VITAMIN D3) 125 MCG
1000 CAPSULE ORAL DAILY
Refills: 0 | Status: DISCONTINUED | OUTPATIENT
Start: 2021-11-21 | End: 2021-11-23

## 2021-11-21 RX ORDER — ACETAMINOPHEN 500 MG
1000 TABLET ORAL ONCE
Refills: 0 | Status: COMPLETED | OUTPATIENT
Start: 2021-11-21 | End: 2021-11-21

## 2021-11-21 RX ADMIN — SODIUM CHLORIDE 75 MILLILITER(S): 9 INJECTION, SOLUTION INTRAVENOUS at 09:41

## 2021-11-21 RX ADMIN — SODIUM CHLORIDE 75 MILLILITER(S): 9 INJECTION, SOLUTION INTRAVENOUS at 06:36

## 2021-11-21 RX ADMIN — HYDROMORPHONE HYDROCHLORIDE 0.5 MILLIGRAM(S): 2 INJECTION INTRAMUSCULAR; INTRAVENOUS; SUBCUTANEOUS at 00:44

## 2021-11-21 RX ADMIN — DICLOFENAC SODIUM 75 MILLIGRAM(S): 75 TABLET, DELAYED RELEASE ORAL at 18:03

## 2021-11-21 RX ADMIN — HYDROMORPHONE HYDROCHLORIDE 0.5 MILLIGRAM(S): 2 INJECTION INTRAMUSCULAR; INTRAVENOUS; SUBCUTANEOUS at 06:39

## 2021-11-21 RX ADMIN — Medication 125 MICROGRAM(S): at 06:39

## 2021-11-21 RX ADMIN — ATORVASTATIN CALCIUM 20 MILLIGRAM(S): 80 TABLET, FILM COATED ORAL at 21:07

## 2021-11-21 RX ADMIN — Medication 750 MILLIGRAM(S): at 09:16

## 2021-11-21 RX ADMIN — Medication 2 SPRAY(S): at 18:03

## 2021-11-21 RX ADMIN — PIPERACILLIN AND TAZOBACTAM 25 GRAM(S): 4; .5 INJECTION, POWDER, LYOPHILIZED, FOR SOLUTION INTRAVENOUS at 15:12

## 2021-11-21 RX ADMIN — Medication 300 MILLIGRAM(S): at 20:03

## 2021-11-21 RX ADMIN — SODIUM CHLORIDE 75 MILLILITER(S): 9 INJECTION, SOLUTION INTRAVENOUS at 21:07

## 2021-11-21 RX ADMIN — PIPERACILLIN AND TAZOBACTAM 25 GRAM(S): 4; .5 INJECTION, POWDER, LYOPHILIZED, FOR SOLUTION INTRAVENOUS at 23:10

## 2021-11-21 RX ADMIN — Medication 400 MILLIGRAM(S): at 02:39

## 2021-11-21 RX ADMIN — PANTOPRAZOLE SODIUM 40 MILLIGRAM(S): 20 TABLET, DELAYED RELEASE ORAL at 21:07

## 2021-11-21 RX ADMIN — PIPERACILLIN AND TAZOBACTAM 25 GRAM(S): 4; .5 INJECTION, POWDER, LYOPHILIZED, FOR SOLUTION INTRAVENOUS at 09:40

## 2021-11-21 RX ADMIN — HYDROMORPHONE HYDROCHLORIDE 0.5 MILLIGRAM(S): 2 INJECTION INTRAMUSCULAR; INTRAVENOUS; SUBCUTANEOUS at 07:00

## 2021-11-21 RX ADMIN — Medication 300 MILLIGRAM(S): at 08:46

## 2021-11-21 RX ADMIN — Medication 750 MILLIGRAM(S): at 15:41

## 2021-11-21 RX ADMIN — Medication 750 MILLIGRAM(S): at 20:18

## 2021-11-21 RX ADMIN — Medication 1000 UNIT(S): at 13:24

## 2021-11-21 RX ADMIN — Medication 1 SPRAY(S): at 13:25

## 2021-11-21 RX ADMIN — ENOXAPARIN SODIUM 40 MILLIGRAM(S): 100 INJECTION SUBCUTANEOUS at 23:11

## 2021-11-21 RX ADMIN — Medication 1000 MILLIGRAM(S): at 03:09

## 2021-11-21 RX ADMIN — LORATADINE 10 MILLIGRAM(S): 10 TABLET ORAL at 13:25

## 2021-11-21 RX ADMIN — PANTOPRAZOLE SODIUM 40 MILLIGRAM(S): 20 TABLET, DELAYED RELEASE ORAL at 06:39

## 2021-11-21 RX ADMIN — Medication 300 MILLIGRAM(S): at 15:11

## 2021-11-21 NOTE — PATIENT PROFILE ADULT - NSPRESCRUSEDDRG_GEN_A_NUR
No
Implemented All Universal Safety Interventions:  [place] to call system. Call bell, personal items and telephone within reach. Instruct patient to call for assistance. Room bathroom lighting operational. Non-slip footwear when patient is off stretcher. Physically safe environment: no spills, clutter or unnecessary equipment. Stretcher in lowest position, wheels locked, appropriate side rails in place.

## 2021-11-21 NOTE — PROGRESS NOTE ADULT - SUBJECTIVE AND OBJECTIVE BOX
Surgery Progress Note    S: Patient seen and examined. No acute events overnight. Pain well controlled. Denies nausea or vomiting     O:  Vital Signs Last 24 Hrs  T(C): 36.7 (21 Nov 2021 13:57), Max: 37.8 (21 Nov 2021 02:46)  T(F): 98.1 (21 Nov 2021 13:57), Max: 100 (21 Nov 2021 02:46)  HR: 89 (21 Nov 2021 13:57) (67 - 96)  BP: 124/81 (21 Nov 2021 13:57) (96/62 - 124/81)  BP(mean): 88 (20 Nov 2021 21:10) (88 - 88)  RR: 18 (21 Nov 2021 13:57) (18 - 18)  SpO2: 95% (21 Nov 2021 13:57) (90% - 96%)    I&O's Detail    20 Nov 2021 07:01  -  21 Nov 2021 07:00  --------------------------------------------------------  IN:    IV PiggyBack: 100 mL    Lactated Ringers: 375 mL  Total IN: 475 mL    OUT:    Voided (mL): 400 mL  Total OUT: 400 mL    Total NET: 75 mL      21 Nov 2021 07:01  -  21 Nov 2021 15:32  --------------------------------------------------------  IN:  Total IN: 0 mL    OUT:    Oral Fluid: 0 mL    Voided (mL): 350 mL  Total OUT: 350 mL    Total NET: -350 mL          MEDICATIONS  (STANDING):  acetaminophen   IVPB .. 750 milliGRAM(s) IV Intermittent every 6 hours  atorvastatin 20 milliGRAM(s) Oral at bedtime  cholecalciferol 1000 Unit(s) Oral daily  diclofenac 75 milliGRAM(s) Oral two times a day  enoxaparin Injectable 40 milliGRAM(s) SubCutaneous every 24 hours  fluticasone propionate 50 MICROgram(s)/spray Nasal Spray 2 Spray(s) Both Nostrils two times a day  lactated ringers. 1000 milliLiter(s) (75 mL/Hr) IV Continuous <Continuous>  levothyroxine 125 MICROGram(s) Oral daily  loratadine 10 milliGRAM(s) Oral daily  pantoprazole    Tablet 40 milliGRAM(s) Oral before breakfast  piperacillin/tazobactam IVPB.. 3.375 Gram(s) IV Intermittent every 8 hours    MEDICATIONS  (PRN):  HYDROmorphone  Injectable 0.5 milliGRAM(s) IV Push every 6 hours PRN Severe Pain (7 - 10)  oxymetazoline 0.05% Nasal Spray 1 Spray(s) Both Nostrils every 12 hours PRN Congestion  sodium chloride 0.65% Nasal 1 Spray(s) Both Nostrils two times a day PRN Nasal Congestion  traMADol 50 milliGRAM(s) Oral every 6 hours PRN Moderate Pain (4 - 6)                            10.1   18.57 )-----------( 231      ( 21 Nov 2021 07:11 )             33.0       11-21    139  |  108  |  9   ----------------------------<  109<H>  3.6   |  16<L>  |  0.78    Ca    9.1      21 Nov 2021 07:07  Phos  2.3     11-21  Mg     1.8     11-21    TPro  7.1  /  Alb  4.1  /  TBili  0.5  /  DBili  x   /  AST  19  /  ALT  17  /  AlkPhos  77  11-20      Physical Exam:  Gen: Laying in bed, NAD  Resp: Unlabored breathing  Abd: soft, TTP in RLQ, nD   Ext: WWP  Skin: No rashes Surgery Progress Note    Patient seen and examined. Patient states her abdominal pain is well controlled. Patient also states that her primary complain is sinus pain/pressure. She asked that her allergy medications and nasal sprays be restarted. Communication with Radiology has been conducted and an addendum to CT report indicates potential appendicolith. We will discuss the significance of these findings with     Vital Signs Last 24 Hrs  T(C): 36.7 (21 Nov 2021 13:57), Max: 37.8 (21 Nov 2021 02:46)  T(F): 98.1 (21 Nov 2021 13:57), Max: 100 (21 Nov 2021 02:46)  HR: 89 (21 Nov 2021 13:57) (67 - 96)  BP: 124/81 (21 Nov 2021 13:57) (96/62 - 124/81)  BP(mean): 88 (20 Nov 2021 21:10) (88 - 88)  RR: 18 (21 Nov 2021 13:57) (18 - 18)  SpO2: 95% (21 Nov 2021 13:57) (90% - 96%)    I&O's Detail    20 Nov 2021 07:01  -  21 Nov 2021 07:00  --------------------------------------------------------  IN:    IV PiggyBack: 100 mL    Lactated Ringers: 375 mL  Total IN: 475 mL  OUT:    Voided (mL): 400 mL  Total OUT: 400 mL  Total NET: 75 mL    21 Nov 2021 07:01  -  21 Nov 2021 15:32  --------------------------------------------------------  IN:  Total IN: 0 mL  OUT:    Oral Fluid: 0 mL    Voided (mL): 350 mL  Total OUT: 350 mL  Total NET: -350 mL                      10.1   18.57 )-----------( 231      ( 21 Nov 2021 07:11 )              33.0     139  |  108  |  9   ----------------------------<  109<H>  3.6   |  16<L>  |  0.78  Ca    9.1      21 Nov 2021 07:07  Phos  2.3     11-21  Mg     1.8     11-21  TPro  7.1  /  Alb  4.1  /  TBili  0.5  /  DBili  x   /  AST  19  /  ALT  17  /  AlkPhos  77  11-20    Physical Exam:  Gen: Laying in bed, NAD  Resp: Unlabored breathing  Abd: soft, TTP in RLQ, nD   Ext: WWP  Skin: No rashes

## 2021-11-21 NOTE — PROGRESS NOTE ADULT - ASSESSMENT
Ms. Nunez is a 71 year old woman with PMH breast cancer s/p mastectomy, CREST syndrome (not on any medications), chronic UTIs, presenting with acute appendicitis    Plan    - will treat with trial of IV antibiotics, if does not progress will consider surgical intervention. This was discussed with patient and daughter  - high risk of poor wound healing with calcinosis secondary to CREST syndrome if she undergoes surgery  - NPO/IVF  - continue Zosyn  - pain control with IV Tylenol, dilaudid while NPO  - VTE ppx with Lovenox

## 2021-11-22 DIAGNOSIS — E03.9 HYPOTHYROIDISM, UNSPECIFIED: ICD-10-CM

## 2021-11-22 DIAGNOSIS — Z29.9 ENCOUNTER FOR PROPHYLACTIC MEASURES, UNSPECIFIED: ICD-10-CM

## 2021-11-22 DIAGNOSIS — D63.8 ANEMIA IN OTHER CHRONIC DISEASES CLASSIFIED ELSEWHERE: ICD-10-CM

## 2021-11-22 DIAGNOSIS — R93.89 ABNORMAL FINDINGS ON DIAGNOSTIC IMAGING OF OTHER SPECIFIED BODY STRUCTURES: ICD-10-CM

## 2021-11-22 DIAGNOSIS — D64.9 ANEMIA, UNSPECIFIED: ICD-10-CM

## 2021-11-22 DIAGNOSIS — K76.89 OTHER SPECIFIED DISEASES OF LIVER: ICD-10-CM

## 2021-11-22 DIAGNOSIS — K35.80 UNSPECIFIED ACUTE APPENDICITIS: ICD-10-CM

## 2021-11-22 DIAGNOSIS — M34.1 CR(E)ST SYNDROME: ICD-10-CM

## 2021-11-22 DIAGNOSIS — N30.20 OTHER CHRONIC CYSTITIS WITHOUT HEMATURIA: ICD-10-CM

## 2021-11-22 LAB
ANION GAP SERPL CALC-SCNC: 10 MMOL/L — SIGNIFICANT CHANGE UP (ref 5–17)
BUN SERPL-MCNC: 10 MG/DL — SIGNIFICANT CHANGE UP (ref 7–23)
CALCIUM SERPL-MCNC: 9.1 MG/DL — SIGNIFICANT CHANGE UP (ref 8.4–10.5)
CHLORIDE SERPL-SCNC: 110 MMOL/L — HIGH (ref 96–108)
CO2 SERPL-SCNC: 19 MMOL/L — LOW (ref 22–31)
COVID-19 NUCLEOCAPSID GAM AB INTERP: NEGATIVE — SIGNIFICANT CHANGE UP
COVID-19 NUCLEOCAPSID TOTAL GAM ANTIBODY RESULT: 0.07 INDEX — SIGNIFICANT CHANGE UP
COVID-19 SPIKE DOMAIN AB INTERP: POSITIVE
COVID-19 SPIKE DOMAIN ANTIBODY RESULT: >250 U/ML — HIGH
CREAT SERPL-MCNC: 0.77 MG/DL — SIGNIFICANT CHANGE UP (ref 0.5–1.3)
GLUCOSE SERPL-MCNC: 102 MG/DL — HIGH (ref 70–99)
HCT VFR BLD CALC: 27.5 % — LOW (ref 34.5–45)
HGB BLD-MCNC: 8.5 G/DL — LOW (ref 11.5–15.5)
MAGNESIUM SERPL-MCNC: 1.8 MG/DL — SIGNIFICANT CHANGE UP (ref 1.6–2.6)
MCHC RBC-ENTMCNC: 27.9 PG — SIGNIFICANT CHANGE UP (ref 27–34)
MCHC RBC-ENTMCNC: 30.9 GM/DL — LOW (ref 32–36)
MCV RBC AUTO: 90.2 FL — SIGNIFICANT CHANGE UP (ref 80–100)
NRBC # BLD: 0 /100 WBCS — SIGNIFICANT CHANGE UP (ref 0–0)
PHOSPHATE SERPL-MCNC: 2.6 MG/DL — SIGNIFICANT CHANGE UP (ref 2.5–4.5)
PLATELET # BLD AUTO: 180 K/UL — SIGNIFICANT CHANGE UP (ref 150–400)
POTASSIUM SERPL-MCNC: 3.3 MMOL/L — LOW (ref 3.5–5.3)
POTASSIUM SERPL-SCNC: 3.3 MMOL/L — LOW (ref 3.5–5.3)
RBC # BLD: 3.05 M/UL — LOW (ref 3.8–5.2)
RBC # FLD: 14.2 % — SIGNIFICANT CHANGE UP (ref 10.3–14.5)
SARS-COV-2 IGG+IGM SERPL QL IA: 0.07 INDEX — SIGNIFICANT CHANGE UP
SARS-COV-2 IGG+IGM SERPL QL IA: >250 U/ML — HIGH
SARS-COV-2 IGG+IGM SERPL QL IA: NEGATIVE — SIGNIFICANT CHANGE UP
SARS-COV-2 IGG+IGM SERPL QL IA: POSITIVE
SODIUM SERPL-SCNC: 139 MMOL/L — SIGNIFICANT CHANGE UP (ref 135–145)
WBC # BLD: 11.71 K/UL — HIGH (ref 3.8–10.5)
WBC # FLD AUTO: 11.71 K/UL — HIGH (ref 3.8–10.5)

## 2021-11-22 PROCEDURE — 99232 SBSQ HOSP IP/OBS MODERATE 35: CPT

## 2021-11-22 PROCEDURE — 99254 IP/OBS CNSLTJ NEW/EST MOD 60: CPT

## 2021-11-22 RX ORDER — POTASSIUM PHOSPHATE, MONOBASIC POTASSIUM PHOSPHATE, DIBASIC 236; 224 MG/ML; MG/ML
30 INJECTION, SOLUTION INTRAVENOUS ONCE
Refills: 0 | Status: COMPLETED | OUTPATIENT
Start: 2021-11-22 | End: 2021-11-22

## 2021-11-22 RX ORDER — TRAMADOL HYDROCHLORIDE 50 MG/1
1 TABLET ORAL
Qty: 0 | Refills: 0 | DISCHARGE

## 2021-11-22 RX ORDER — ACETAMINOPHEN 500 MG
1000 TABLET ORAL EVERY 8 HOURS
Refills: 0 | Status: COMPLETED | OUTPATIENT
Start: 2021-11-22 | End: 2021-11-23

## 2021-11-22 RX ORDER — LACTOBACILLUS ACIDOPHILUS 100MM CELL
1 CAPSULE ORAL DAILY
Refills: 0 | Status: DISCONTINUED | OUTPATIENT
Start: 2021-11-22 | End: 2021-11-23

## 2021-11-22 RX ORDER — SODIUM CHLORIDE 9 MG/ML
3 INJECTION INTRAMUSCULAR; INTRAVENOUS; SUBCUTANEOUS EVERY 8 HOURS
Refills: 0 | Status: DISCONTINUED | OUTPATIENT
Start: 2021-11-22 | End: 2021-11-23

## 2021-11-22 RX ORDER — LORATADINE 10 MG/1
0 TABLET ORAL
Qty: 0 | Refills: 0 | DISCHARGE

## 2021-11-22 RX ORDER — PHENAZOPYRIDINE HCL 100 MG
100 TABLET ORAL
Refills: 0 | Status: DISCONTINUED | OUTPATIENT
Start: 2021-11-22 | End: 2021-11-23

## 2021-11-22 RX ORDER — POLYETHYLENE GLYCOL 3350 17 G/17G
17 POWDER, FOR SOLUTION ORAL DAILY
Refills: 0 | Status: DISCONTINUED | OUTPATIENT
Start: 2021-11-22 | End: 2021-11-23

## 2021-11-22 RX ORDER — CHOLECALCIFEROL (VITAMIN D3) 125 MCG
1 CAPSULE ORAL
Qty: 0 | Refills: 0 | DISCHARGE

## 2021-11-22 RX ORDER — IBUPROFEN 200 MG
1 TABLET ORAL
Qty: 0 | Refills: 0 | DISCHARGE

## 2021-11-22 RX ORDER — MAGNESIUM SULFATE 500 MG/ML
2 VIAL (ML) INJECTION ONCE
Refills: 0 | Status: COMPLETED | OUTPATIENT
Start: 2021-11-22 | End: 2021-11-22

## 2021-11-22 RX ADMIN — Medication 1000 MILLIGRAM(S): at 22:00

## 2021-11-22 RX ADMIN — TRAMADOL HYDROCHLORIDE 50 MILLIGRAM(S): 50 TABLET ORAL at 00:34

## 2021-11-22 RX ADMIN — SODIUM CHLORIDE 3 MILLILITER(S): 9 INJECTION INTRAMUSCULAR; INTRAVENOUS; SUBCUTANEOUS at 22:00

## 2021-11-22 RX ADMIN — DICLOFENAC SODIUM 75 MILLIGRAM(S): 75 TABLET, DELAYED RELEASE ORAL at 18:02

## 2021-11-22 RX ADMIN — TRAMADOL HYDROCHLORIDE 50 MILLIGRAM(S): 50 TABLET ORAL at 22:00

## 2021-11-22 RX ADMIN — Medication 2 SPRAY(S): at 18:03

## 2021-11-22 RX ADMIN — TRAMADOL HYDROCHLORIDE 50 MILLIGRAM(S): 50 TABLET ORAL at 22:17

## 2021-11-22 RX ADMIN — PIPERACILLIN AND TAZOBACTAM 25 GRAM(S): 4; .5 INJECTION, POWDER, LYOPHILIZED, FOR SOLUTION INTRAVENOUS at 18:02

## 2021-11-22 RX ADMIN — Medication 400 MILLIGRAM(S): at 21:44

## 2021-11-22 RX ADMIN — Medication 750 MILLIGRAM(S): at 00:00

## 2021-11-22 RX ADMIN — Medication 2 SPRAY(S): at 05:07

## 2021-11-22 RX ADMIN — Medication 1000 UNIT(S): at 11:57

## 2021-11-22 RX ADMIN — PIPERACILLIN AND TAZOBACTAM 25 GRAM(S): 4; .5 INJECTION, POWDER, LYOPHILIZED, FOR SOLUTION INTRAVENOUS at 10:32

## 2021-11-22 RX ADMIN — DICLOFENAC SODIUM 75 MILLIGRAM(S): 75 TABLET, DELAYED RELEASE ORAL at 18:35

## 2021-11-22 RX ADMIN — Medication 50 GRAM(S): at 15:10

## 2021-11-22 RX ADMIN — Medication 1000 MILLIGRAM(S): at 16:20

## 2021-11-22 RX ADMIN — Medication 100 MILLIGRAM(S): at 15:49

## 2021-11-22 RX ADMIN — LORATADINE 10 MILLIGRAM(S): 10 TABLET ORAL at 11:57

## 2021-11-22 RX ADMIN — POTASSIUM PHOSPHATE, MONOBASIC POTASSIUM PHOSPHATE, DIBASIC 83.33 MILLIMOLE(S): 236; 224 INJECTION, SOLUTION INTRAVENOUS at 11:58

## 2021-11-22 RX ADMIN — PANTOPRAZOLE SODIUM 40 MILLIGRAM(S): 20 TABLET, DELAYED RELEASE ORAL at 05:07

## 2021-11-22 RX ADMIN — DICLOFENAC SODIUM 75 MILLIGRAM(S): 75 TABLET, DELAYED RELEASE ORAL at 05:06

## 2021-11-22 RX ADMIN — Medication 1 TABLET(S): at 15:49

## 2021-11-22 RX ADMIN — ATORVASTATIN CALCIUM 20 MILLIGRAM(S): 80 TABLET, FILM COATED ORAL at 21:44

## 2021-11-22 RX ADMIN — Medication 300 MILLIGRAM(S): at 02:33

## 2021-11-22 RX ADMIN — Medication 125 MICROGRAM(S): at 05:06

## 2021-11-22 RX ADMIN — Medication 400 MILLIGRAM(S): at 15:51

## 2021-11-22 RX ADMIN — TRAMADOL HYDROCHLORIDE 50 MILLIGRAM(S): 50 TABLET ORAL at 01:00

## 2021-11-22 RX ADMIN — DICLOFENAC SODIUM 75 MILLIGRAM(S): 75 TABLET, DELAYED RELEASE ORAL at 05:36

## 2021-11-22 NOTE — CONSULT NOTE ADULT - PROBLEM SELECTOR RECOMMENDATION 6
"LIVER: 1 cm hypodensity in the dome. 1.1 cm central hypodensity.   Punctate angiomyolipoma upper pole right kidney. Small right renal cyst"  - outpt monitoring with PCP

## 2021-11-22 NOTE — CONSULT NOTE ADULT - PROBLEM SELECTOR RECOMMENDATION 2
prior outpt labs shows hgb 11, however downtrending since admission  may be in setting of marrow suppression 2/2 infection  - monitor cbc  - check iron panel, ferritin, b12, folate - outpt follow up  - no s/s bleeding per pt

## 2021-11-22 NOTE — PROGRESS NOTE ADULT - ASSESSMENT
Ms. Nunez is a 71 year old woman with PMH breast cancer s/p mastectomy, CREST syndrome (not on any medications), chronic UTIs, presenting with acute appendicitis    Plan    - will treat with trial of IV antibiotics, if does not progress will consider surgical intervention. This was discussed with patient and daughter  - high risk of poor wound healing with calcinosis secondary to CREST syndrome if she undergoes surgery  - NPO/IVF  - continue Zosyn  - pain control with IV Tylenol, dilaudid while NPO  - VTE ppx with Lovenox    ACS  [id number]   Ms. Nunez is a 71 year old woman with PMH breast cancer s/p mastectomy, CREST syndrome (not on any medications), chronic UTIs, presenting with acute appendicitis    Plan    - will treat with trial of IV antibiotics, if does not progress will consider surgical intervention. This was discussed with patient and daughter  - high risk of poor wound healing with calcinosis secondary to CREST syndrome if she undergoes surgery  - advance to [facility]  - continue Zosyn  - pain control with IV Tylenol  - VTE ppx with Lovenox    ACS  [id number]

## 2021-11-22 NOTE — PROGRESS NOTE ADULT - SUBJECTIVE AND OBJECTIVE BOX
ACS DAILY PROGRESS NOTE:       SUBJECTIVE/ROS: No acute events overnight         MEDICATIONS  (STANDING):  acetaminophen   IVPB .. 750 milliGRAM(s) IV Intermittent every 6 hours  atorvastatin 20 milliGRAM(s) Oral at bedtime  cholecalciferol 1000 Unit(s) Oral daily  diclofenac 75 milliGRAM(s) Oral two times a day  enoxaparin Injectable 40 milliGRAM(s) SubCutaneous every 24 hours  fluticasone propionate 50 MICROgram(s)/spray Nasal Spray 2 Spray(s) Both Nostrils two times a day  lactated ringers. 1000 milliLiter(s) (75 mL/Hr) IV Continuous <Continuous>  levothyroxine 125 MICROGram(s) Oral daily  loratadine 10 milliGRAM(s) Oral daily  pantoprazole    Tablet 40 milliGRAM(s) Oral before breakfast  piperacillin/tazobactam IVPB.. 3.375 Gram(s) IV Intermittent every 8 hours    MEDICATIONS  (PRN):  HYDROmorphone  Injectable 0.5 milliGRAM(s) IV Push every 6 hours PRN Severe Pain (7 - 10)  oxymetazoline 0.05% Nasal Spray 1 Spray(s) Both Nostrils every 12 hours PRN Congestion  sodium chloride 0.65% Nasal 1 Spray(s) Both Nostrils two times a day PRN Nasal Congestion  traMADol 50 milliGRAM(s) Oral every 6 hours PRN Moderate Pain (4 - 6)      OBJECTIVE:    Vital Signs Last 24 Hrs  T(C): 36.7 (2021 00:19), Max: 37.8 (2021 02:46)  T(F): 98.1 (2021 00:19), Max: 100 (2021 02:46)  HR: 78 (2021 00:19) (67 - 96)  BP: 119/83 (2021 00:19) (96/62 - 124/81)  BP(mean): --  RR: 18 (2021 00:19) (18 - 18)  SpO2: 99% (2021 00:19) (90% - 99%)        Physical Exam:  Gen: Laying in bed, NAD  Resp: Unlabored breathing  Abd: soft, TTP in RLQ, nD   Ext: WWP  Skin: No rashes    I&O's Detail    2021 07:01  -  2021 07:00  --------------------------------------------------------  IN:    IV PiggyBack: 100 mL    Lactated Ringers: 375 mL  Total IN: 475 mL    OUT:    Voided (mL): 400 mL  Total OUT: 400 mL    Total NET: 75 mL      2021 07:01  -  2021 02:13  --------------------------------------------------------  IN:    IV PiggyBack: 450 mL    Lactated Ringers: 900 mL  Total IN: 1350 mL    OUT:    Oral Fluid: 0 mL    Voided (mL): 500 mL  Total OUT: 500 mL    Total NET: 850 mL          Daily     Daily     LABS:                        10.1   18.57 )-----------( 231      ( 2021 07:11 )             33.0     11-21    139  |  108  |  9   ----------------------------<  109<H>  3.6   |  16<L>  |  0.78    Ca    9.1      2021 07:07  Phos  2.3     11-21  Mg     1.8     11-    TPro  7.1  /  Alb  4.1  /  TBili  0.5  /  DBili  x   /  AST  19  /  ALT  17  /  AlkPhos  77  11-20    PT/INR - ( 2021 20:01 )   PT: 14.7 sec;   INR: 1.24 ratio         PTT - ( 2021 20:01 )  PTT:31.0 sec  Urinalysis Basic - ( 2021 17:05 )    Color: Dark Yellow / Appearance: Clear / S.017 / pH: x  Gluc: x / Ketone: Negative  / Bili: Negative / Urobili: Negative   Blood: x / Protein: Trace / Nitrite: Negative   Leuk Esterase: Negative / RBC: 6 /hpf / WBC 3 /HPF   Sq Epi: x / Non Sq Epi: 2 /hpf / Bacteria: Many                   ACS DAILY PROGRESS NOTE:       SUBJECTIVE/ROS: No acute events overnight.  States she is feeling better and is requesting regular diet.  Denies chest pain, SOB, nausea, vomiting.          MEDICATIONS  (STANDING):  acetaminophen   IVPB .. 750 milliGRAM(s) IV Intermittent every 6 hours  atorvastatin 20 milliGRAM(s) Oral at bedtime  cholecalciferol 1000 Unit(s) Oral daily  diclofenac 75 milliGRAM(s) Oral two times a day  enoxaparin Injectable 40 milliGRAM(s) SubCutaneous every 24 hours  fluticasone propionate 50 MICROgram(s)/spray Nasal Spray 2 Spray(s) Both Nostrils two times a day  lactated ringers. 1000 milliLiter(s) (75 mL/Hr) IV Continuous <Continuous>  levothyroxine 125 MICROGram(s) Oral daily  loratadine 10 milliGRAM(s) Oral daily  pantoprazole    Tablet 40 milliGRAM(s) Oral before breakfast  piperacillin/tazobactam IVPB.. 3.375 Gram(s) IV Intermittent every 8 hours    MEDICATIONS  (PRN):  HYDROmorphone  Injectable 0.5 milliGRAM(s) IV Push every 6 hours PRN Severe Pain (7 - 10)  oxymetazoline 0.05% Nasal Spray 1 Spray(s) Both Nostrils every 12 hours PRN Congestion  sodium chloride 0.65% Nasal 1 Spray(s) Both Nostrils two times a day PRN Nasal Congestion  traMADol 50 milliGRAM(s) Oral every 6 hours PRN Moderate Pain (4 - 6)      OBJECTIVE:    Vital Signs Last 24 Hrs  T(C): 36.7 (2021 00:19), Max: 37.8 (2021 02:46)  T(F): 98.1 (2021 00:19), Max: 100 (2021 02:46)  HR: 78 (2021 00:19) (67 - 96)  BP: 119/83 (2021 00:19) (96/62 - 124/81)  BP(mean): --  RR: 18 (2021 00:19) (18 - 18)  SpO2: 99% (2021 00:19) (90% - 99%)        Physical Exam:  Gen: Laying in bed, NAD  Resp: Unlabored breathing  Abd: soft, nontender, nondistended  Ext: WWP  Skin: No rashes    I&O's Detail    2021 07:01  -  2021 07:00  --------------------------------------------------------  IN:    IV PiggyBack: 100 mL    Lactated Ringers: 375 mL  Total IN: 475 mL    OUT:    Voided (mL): 400 mL  Total OUT: 400 mL    Total NET: 75 mL      2021 07:01  -  2021 02:13  --------------------------------------------------------  IN:    IV PiggyBack: 450 mL    Lactated Ringers: 900 mL  Total IN: 1350 mL    OUT:    Oral Fluid: 0 mL    Voided (mL): 500 mL  Total OUT: 500 mL    Total NET: 850 mL          Daily     Daily     LABS:                        10.1   18.57 )-----------( 231      ( 2021 07:11 )             33.0     11-21    139  |  108  |  9   ----------------------------<  109<H>  3.6   |  16<L>  |  0.78    Ca    9.1      2021 07:07  Phos  2.3     11-  Mg     1.8     -    TPro  7.1  /  Alb  4.1  /  TBili  0.5  /  DBili  x   /  AST  19  /  ALT  17  /  AlkPhos  77  11-20    PT/INR - ( 2021 20:01 )   PT: 14.7 sec;   INR: 1.24 ratio         PTT - ( 2021 20:01 )  PTT:31.0 sec  Urinalysis Basic - ( 2021 17:05 )    Color: Dark Yellow / Appearance: Clear / S.017 / pH: x  Gluc: x / Ketone: Negative  / Bili: Negative / Urobili: Negative   Blood: x / Protein: Trace / Nitrite: Negative   Leuk Esterase: Negative / RBC: 6 /hpf / WBC 3 /HPF   Sq Epi: x / Non Sq Epi: 2 /hpf / Bacteria: Many

## 2021-11-22 NOTE — PROGRESS NOTE ADULT - ATTENDING COMMENTS
looks well  decreased pain  still with some focal tenderness  will advance diet,  will continue I.V. antibiotics   if goes well possible discharge tomorrow

## 2021-11-22 NOTE — CONSULT NOTE ADULT - ASSESSMENT
70 yo F PMH Crest Syndrome, breast ca s/p mastectomy, chronic UTIs, HLD p/w rlq pain adm with acute appendicitis medically managing.

## 2021-11-22 NOTE — CONSULT NOTE ADULT - PROBLEM SELECTOR RECOMMENDATION 9
CT w/out an abscess, w/ possible appendecolith  - medically managing as per surgery - c/w zosyn iv  - ivf  - advanced diet to regular diet  - monitor leukocytosis and temperature  - hypok repleted

## 2021-11-22 NOTE — CONSULT NOTE ADULT - SUBJECTIVE AND OBJECTIVE BOX
[facility] ATTENDING INITIAL CONSULT NOTE    Nereida Burton MD  Division of Hospital Medicine  Pager [phone]  If no response or off hours, page [phone]    HPI:  History of Present Illness  Ms. Nunez is a 71 year old woman with PMH breast cancer s/p mastectomy, CREST syndrome (not on any medications), chronic UTIs, presenting with one day of right lower quadrant pain. She reports a few days of malaise and feeling dehydrated, when she developed abdominal pain. She first attributed her pain to constipation, however had no relief from laxatives. She reports decreased appetite, but has not had nausea or vomiting. She denies fevers during this time. She has not had diarrhea or changes in urination.    Upon presentation to the ED, patient is afebrile, normotensive, and tachycardic. Labs notable for WBC 16 and lactate 2.2. She was given 500cc bolus with improvement in HR and lactate to 0.8. CTAP demonstrated 1.1 cm thickened appendix without abscess.  (2021 22:38)      SUBJECTIVE:   seen w/  at bedside, states she feels much improved since receiving abx  tolerating clears this morning  no n/v/f/chills, cp, sob  says her abd pain is now 5/10 from 13/10 is just feels a little tender now  Denies any bleeding or dizziness.      Home Medications:  Claritin 10 mg oral tablet: 1 tab(s) orally once a day (2021 14:18)  diclofenac sodium 75 mg oral delayed release tablet: 1 tab(s) orally 2 times a day, As Needed -  (2021 14:18)  levothyroxine 125 mcg (0.125 mg) oral capsule: 1 cap(s) orally once a day (2021 14:18)  Nasonex 50 mcg/inh nasal spray: 2 spray(s) nasal once a day (2021 14:18)  omeprazole 40 mg oral delayed release capsule: 1 cap(s) orally once a day (2021 14:18)  phenazopyridine 100 mg oral tablet:  (2021 14:18)  rosuvastatin 5 mg oral tablet: 1 tab(s) orally once a day (2021 14:18)  traMADol 50 mg oral tablet: 1 tab(s) orally every 8 hours, As Needed (2021 14:18)  Vitamin D3 5000 intl units oral capsule: 2 cap(s) orally once a day (2021 14:18)      PAST MEDICAL & SURGICAL HISTORY:  CREST (calcinosis, Raynaud&#x27;s phenomenon, esophageal dysfunction, sclerodactyly, telangiectasia)  affecting lungs, bladder, kidney&#x27;s, esophagus, skin, eyes and ears    Scleroderma    Breast cancer  right breast CA in     Primary osteoarthritis of right knee    Hypothyroid    Lymphedema  right arm    Varicose veins    Chronic sinusitis    Bladder infection, chronic    H/O lumpectomy  of right breast in 2005 with removal of 2 lymph nodes    S/P radiation therapy  for 9 weeks      Review of Systems:   CONSTITUTIONAL: No fever, weight loss, or fatigue  EYES: No eye pain, visual disturbances, or discharge  ENMT:  No difficulty hearing, tinnitus, vertigo; No sinus or throat pain  NECK: No pain or stiffness  RESPIRATORY: No cough, wheezing, chills or hemoptysis; No shortness of breath  CARDIOVASCULAR: No chest pain, palpitations, dizziness  GASTROINTESTINAL: +abd pain. No nausea, vomiting, or hematemesis; No diarrhea or constipation. No melena or hematochezia.  GENITOURINARY: No dysuria, frequency, hematuria, or incontinence  NEUROLOGICAL: No headaches, memory loss, loss of strength, numbness, or tremors  SKIN: No itching, burning, rashes, or lesions   LYMPH NODES: No enlarged glands  ENDOCRINE: No hair loss  PSYCHIATRIC: No depression, anxiety, mood swings, or difficulty sleeping  HEME/LYMPH: No easy bruising, or bleeding gums  ALLERY AND IMMUNOLOGIC: No hives or eczema    Allergies    No Known Allergies    Intolerances    Social History:   denies smoking, etoh    FAMILY HISTORY:  Family history of breast cancer (Grandparent)    Family history of heart attack (Mother)  mother  age 69    Family history of pancreatic cancer (Grandparent)     Noncontributory    Vital Signs Last 24 Hrs  T(C): 36.4 (2021 13:53), Max: 37 (2021 16:32)  T(F): 97.5 (2021 13:53), Max: 98.6 (2021 16:32)  HR: 72 (2021 13:53) (72 - 88)  BP: 122/77 (2021 13:53) (100/63 - 122/77)  BP(mean): --  RR: 18 (2021 13:53) (18 - 18)  SpO2: 99% (2021 13:53) (93% - 99%)  CAPILLARY BLOOD GLUCOSE    Vital Signs Last 24 Hrs  T(C): 36.4 (2021 13:53), Max: 37 (2021 16:32)  T(F): 97.5 (2021 13:53), Max: 98.6 (2021 16:32)  HR: 72 (2021 13:53) (72 - 88)  BP: 122/77 (2021 13:53) (100/63 - 122/77)  BP(mean): --  RR: 18 (2021 13:53) (18 - 18)  SpO2: 99% (2021 13:53) (93% - 99%)    CONSTITUTIONAL: Well-developed, well-groomed, in no apparent distress  EYES: No conjunctival or scleral injection, non-icteric; PERRLA and symmetric  ENMT: No external nasal lesions; nasal mucosa not inflamed; normal dentition; no pharyngeal injection or exudates, oral mucosa with moist membranes  RESPIRATORY: Breathing comfortably; lungs CTA without wheeze/rhonchi/rales  CARDIOVASCULAR: +S1S2, RRR, no M/G/R; pedal pulses full and symmetric; no lower extremity edema  GASTROINTESTINAL: No palpable masses, mild rlq tenderness. no rebound/guarding; no hernia palpated  LYMPHATIC: No cervical LAD; no axillary LAD; no inguinal LAD  MUSCULOSKELETAL: no paraspinal tenderness; normal strength and tone of extremities  SKIN: No rashes or ulcers noted; no subcutaneous nodules or induration palpable  NEUROLOGIC:  sensation intact in LEs b/l to light touch  PSYCHIATRIC: A+O x 3; mood and affect appropriate; appropriate insight and judgment      LABS:                        8.5    11.71 )-----------( 180      ( 2021 07:17 )             27.5     11    139  |  110<H>  |  10  ----------------------------<  102<H>  3.3<L>   |  19<L>  |  0.77    Ca    9.1      2021 07:17  Phos  2.6     11-  Mg     1.8         TPro  7.1  /  Alb  4.1  /  TBili  0.5  /  DBili  x   /  AST  19  /  ALT  17  /  AlkPhos  77  11-20    PT/INR - ( 2021 20:01 )   PT: 14.7 sec;   INR: 1.24 ratio         PTT - ( 2021 20:01 )  PTT:31.0 sec      Urinalysis Basic - ( 2021 17:05 )    Color: Dark Yellow / Appearance: Clear / S.017 / pH: x  Gluc: x / Ketone: Negative  / Bili: Negative / Urobili: Negative   Blood: x / Protein: Trace / Nitrite: Negative   Leuk Esterase: Negative / RBC: 6 /hpf / WBC 3 /HPF   Sq Epi: x / Non Sq Epi: 2 /hpf / Bacteria: Many        Culture - Urine (collected 2021 21:20)  Source: Clean Catch Clean Catch (Midstream)  Final Report (2021 17:23):    <10,000 CFU/mL Normal Urogenital Lucero    MEDICATIONS  (STANDING):  acetaminophen   IVPB .. 1000 milliGRAM(s) IV Intermittent every 8 hours  atorvastatin 20 milliGRAM(s) Oral at bedtime  cholecalciferol 1000 Unit(s) Oral daily  diclofenac 75 milliGRAM(s) Oral two times a day  enoxaparin Injectable 40 milliGRAM(s) SubCutaneous every 24 hours  fluticasone propionate 50 MICROgram(s)/spray Nasal Spray 2 Spray(s) Both Nostrils two times a day  lactated ringers. 1000 milliLiter(s) (75 mL/Hr) IV Continuous <Continuous>  lactobacillus acidophilus 1 Tablet(s) Oral daily  levothyroxine 125 MICROGram(s) Oral daily  loratadine 10 milliGRAM(s) Oral daily  magnesium sulfate  IVPB 2 Gram(s) IV Intermittent once  pantoprazole    Tablet 40 milliGRAM(s) Oral before breakfast  piperacillin/tazobactam IVPB.. 3.375 Gram(s) IV Intermittent every 8 hours    MEDICATIONS  (PRN):  HYDROmorphone  Injectable 0.5 milliGRAM(s) IV Push every 6 hours PRN Severe Pain (7 - 10)  oxymetazoline 0.05% Nasal Spray 1 Spray(s) Both Nostrils every 12 hours PRN Congestion  sodium chloride 0.65% Nasal 1 Spray(s) Both Nostrils two times a day PRN Nasal Congestion  traMADol 50 milliGRAM(s) Oral every 6 hours PRN Moderate Pain (4 - 6)

## 2021-11-23 ENCOUNTER — TRANSCRIPTION ENCOUNTER (OUTPATIENT)
Age: 71
End: 2021-11-23

## 2021-11-23 VITALS
HEART RATE: 65 BPM | DIASTOLIC BLOOD PRESSURE: 68 MMHG | RESPIRATION RATE: 18 BRPM | OXYGEN SATURATION: 96 % | TEMPERATURE: 98 F | SYSTOLIC BLOOD PRESSURE: 130 MMHG

## 2021-11-23 LAB
ANION GAP SERPL CALC-SCNC: 10 MMOL/L — SIGNIFICANT CHANGE UP (ref 5–17)
BUN SERPL-MCNC: 9 MG/DL — SIGNIFICANT CHANGE UP (ref 7–23)
CALCIUM SERPL-MCNC: 9.2 MG/DL — SIGNIFICANT CHANGE UP (ref 8.4–10.5)
CHLORIDE SERPL-SCNC: 110 MMOL/L — HIGH (ref 96–108)
CO2 SERPL-SCNC: 20 MMOL/L — LOW (ref 22–31)
CREAT SERPL-MCNC: 0.83 MG/DL — SIGNIFICANT CHANGE UP (ref 0.5–1.3)
FERRITIN SERPL-MCNC: 175 NG/ML — HIGH (ref 15–150)
FOLATE SERPL-MCNC: 9.8 NG/ML — SIGNIFICANT CHANGE UP
GLUCOSE SERPL-MCNC: 113 MG/DL — HIGH (ref 70–99)
HCT VFR BLD CALC: 28.8 % — LOW (ref 34.5–45)
HGB BLD-MCNC: 8.9 G/DL — LOW (ref 11.5–15.5)
IRON SATN MFR SERPL: 18 UG/DL — LOW (ref 30–160)
MAGNESIUM SERPL-MCNC: 2.2 MG/DL — SIGNIFICANT CHANGE UP (ref 1.6–2.6)
MCHC RBC-ENTMCNC: 27.6 PG — SIGNIFICANT CHANGE UP (ref 27–34)
MCHC RBC-ENTMCNC: 30.9 GM/DL — LOW (ref 32–36)
MCV RBC AUTO: 89.4 FL — SIGNIFICANT CHANGE UP (ref 80–100)
NRBC # BLD: 0 /100 WBCS — SIGNIFICANT CHANGE UP (ref 0–0)
PHOSPHATE SERPL-MCNC: 3 MG/DL — SIGNIFICANT CHANGE UP (ref 2.5–4.5)
PLATELET # BLD AUTO: 201 K/UL — SIGNIFICANT CHANGE UP (ref 150–400)
POTASSIUM SERPL-MCNC: 3.6 MMOL/L — SIGNIFICANT CHANGE UP (ref 3.5–5.3)
POTASSIUM SERPL-SCNC: 3.6 MMOL/L — SIGNIFICANT CHANGE UP (ref 3.5–5.3)
RBC # BLD: 3.22 M/UL — LOW (ref 3.8–5.2)
RBC # FLD: 14.1 % — SIGNIFICANT CHANGE UP (ref 10.3–14.5)
SODIUM SERPL-SCNC: 140 MMOL/L — SIGNIFICANT CHANGE UP (ref 135–145)
VIT B12 SERPL-MCNC: 526 PG/ML — SIGNIFICANT CHANGE UP (ref 232–1245)
WBC # BLD: 8.46 K/UL — SIGNIFICANT CHANGE UP (ref 3.8–10.5)
WBC # FLD AUTO: 8.46 K/UL — SIGNIFICANT CHANGE UP (ref 3.8–10.5)

## 2021-11-23 PROCEDURE — 85018 HEMOGLOBIN: CPT

## 2021-11-23 PROCEDURE — 87086 URINE CULTURE/COLONY COUNT: CPT

## 2021-11-23 PROCEDURE — 86900 BLOOD TYPING SEROLOGIC ABO: CPT

## 2021-11-23 PROCEDURE — U0003: CPT

## 2021-11-23 PROCEDURE — 82803 BLOOD GASES ANY COMBINATION: CPT

## 2021-11-23 PROCEDURE — 84132 ASSAY OF SERUM POTASSIUM: CPT

## 2021-11-23 PROCEDURE — 83690 ASSAY OF LIPASE: CPT

## 2021-11-23 PROCEDURE — 85014 HEMATOCRIT: CPT

## 2021-11-23 PROCEDURE — 84295 ASSAY OF SERUM SODIUM: CPT

## 2021-11-23 PROCEDURE — 86901 BLOOD TYPING SEROLOGIC RH(D): CPT

## 2021-11-23 PROCEDURE — 85027 COMPLETE CBC AUTOMATED: CPT

## 2021-11-23 PROCEDURE — 83735 ASSAY OF MAGNESIUM: CPT

## 2021-11-23 PROCEDURE — 80053 COMPREHEN METABOLIC PANEL: CPT

## 2021-11-23 PROCEDURE — 86850 RBC ANTIBODY SCREEN: CPT

## 2021-11-23 PROCEDURE — 99232 SBSQ HOSP IP/OBS MODERATE 35: CPT

## 2021-11-23 PROCEDURE — 85025 COMPLETE CBC W/AUTO DIFF WBC: CPT

## 2021-11-23 PROCEDURE — 81001 URINALYSIS AUTO W/SCOPE: CPT

## 2021-11-23 PROCEDURE — 82947 ASSAY GLUCOSE BLOOD QUANT: CPT

## 2021-11-23 PROCEDURE — 99285 EMERGENCY DEPT VISIT HI MDM: CPT | Mod: 25

## 2021-11-23 PROCEDURE — 82746 ASSAY OF FOLIC ACID SERUM: CPT

## 2021-11-23 PROCEDURE — 83540 ASSAY OF IRON: CPT

## 2021-11-23 PROCEDURE — 83605 ASSAY OF LACTIC ACID: CPT

## 2021-11-23 PROCEDURE — 96375 TX/PRO/DX INJ NEW DRUG ADDON: CPT

## 2021-11-23 PROCEDURE — 82565 ASSAY OF CREATININE: CPT

## 2021-11-23 PROCEDURE — 82435 ASSAY OF BLOOD CHLORIDE: CPT

## 2021-11-23 PROCEDURE — 85730 THROMBOPLASTIN TIME PARTIAL: CPT

## 2021-11-23 PROCEDURE — 82728 ASSAY OF FERRITIN: CPT

## 2021-11-23 PROCEDURE — 86769 SARS-COV-2 COVID-19 ANTIBODY: CPT

## 2021-11-23 PROCEDURE — 96365 THER/PROPH/DIAG IV INF INIT: CPT

## 2021-11-23 PROCEDURE — 84100 ASSAY OF PHOSPHORUS: CPT

## 2021-11-23 PROCEDURE — U0005: CPT

## 2021-11-23 PROCEDURE — 85610 PROTHROMBIN TIME: CPT

## 2021-11-23 PROCEDURE — 36415 COLL VENOUS BLD VENIPUNCTURE: CPT

## 2021-11-23 PROCEDURE — G1004: CPT

## 2021-11-23 PROCEDURE — 82330 ASSAY OF CALCIUM: CPT

## 2021-11-23 PROCEDURE — 80048 BASIC METABOLIC PNL TOTAL CA: CPT

## 2021-11-23 PROCEDURE — 82607 VITAMIN B-12: CPT

## 2021-11-23 PROCEDURE — 96376 TX/PRO/DX INJ SAME DRUG ADON: CPT

## 2021-11-23 PROCEDURE — 94640 AIRWAY INHALATION TREATMENT: CPT

## 2021-11-23 PROCEDURE — 86803 HEPATITIS C AB TEST: CPT

## 2021-11-23 PROCEDURE — 74177 CT ABD & PELVIS W/CONTRAST: CPT | Mod: ME

## 2021-11-23 RX ORDER — POTASSIUM CHLORIDE 20 MEQ
20 PACKET (EA) ORAL
Refills: 0 | Status: COMPLETED | OUTPATIENT
Start: 2021-11-23 | End: 2021-11-23

## 2021-11-23 RX ADMIN — DICLOFENAC SODIUM 75 MILLIGRAM(S): 75 TABLET, DELAYED RELEASE ORAL at 17:22

## 2021-11-23 RX ADMIN — Medication 20 MILLIEQUIVALENT(S): at 11:35

## 2021-11-23 RX ADMIN — LORATADINE 10 MILLIGRAM(S): 10 TABLET ORAL at 11:35

## 2021-11-23 RX ADMIN — DICLOFENAC SODIUM 75 MILLIGRAM(S): 75 TABLET, DELAYED RELEASE ORAL at 18:56

## 2021-11-23 RX ADMIN — Medication 1 TABLET(S): at 11:35

## 2021-11-23 RX ADMIN — Medication 20 MILLIEQUIVALENT(S): at 10:31

## 2021-11-23 RX ADMIN — DICLOFENAC SODIUM 75 MILLIGRAM(S): 75 TABLET, DELAYED RELEASE ORAL at 05:29

## 2021-11-23 RX ADMIN — Medication 1000 MILLIGRAM(S): at 11:01

## 2021-11-23 RX ADMIN — Medication 1 SPRAY(S): at 05:30

## 2021-11-23 RX ADMIN — Medication 2 SPRAY(S): at 17:22

## 2021-11-23 RX ADMIN — SODIUM CHLORIDE 3 MILLILITER(S): 9 INJECTION INTRAMUSCULAR; INTRAVENOUS; SUBCUTANEOUS at 13:26

## 2021-11-23 RX ADMIN — Medication 125 MICROGRAM(S): at 05:30

## 2021-11-23 RX ADMIN — Medication 1000 UNIT(S): at 11:35

## 2021-11-23 RX ADMIN — PIPERACILLIN AND TAZOBACTAM 25 GRAM(S): 4; .5 INJECTION, POWDER, LYOPHILIZED, FOR SOLUTION INTRAVENOUS at 16:05

## 2021-11-23 RX ADMIN — Medication 400 MILLIGRAM(S): at 10:31

## 2021-11-23 RX ADMIN — DICLOFENAC SODIUM 75 MILLIGRAM(S): 75 TABLET, DELAYED RELEASE ORAL at 06:26

## 2021-11-23 RX ADMIN — PANTOPRAZOLE SODIUM 40 MILLIGRAM(S): 20 TABLET, DELAYED RELEASE ORAL at 08:55

## 2021-11-23 RX ADMIN — SODIUM CHLORIDE 3 MILLILITER(S): 9 INJECTION INTRAMUSCULAR; INTRAVENOUS; SUBCUTANEOUS at 05:36

## 2021-11-23 RX ADMIN — PIPERACILLIN AND TAZOBACTAM 25 GRAM(S): 4; .5 INJECTION, POWDER, LYOPHILIZED, FOR SOLUTION INTRAVENOUS at 00:07

## 2021-11-23 RX ADMIN — PIPERACILLIN AND TAZOBACTAM 25 GRAM(S): 4; .5 INJECTION, POWDER, LYOPHILIZED, FOR SOLUTION INTRAVENOUS at 08:55

## 2021-11-23 NOTE — PROGRESS NOTE ADULT - TIME BILLING
looks very well  minimal pain, minimal tenderness  no fevers,  WBC at acceptable count  plan for discharge to leave on Augmentin

## 2021-11-23 NOTE — PROGRESS NOTE ADULT - PROBLEM SELECTOR PLAN 6
"LIVER: 1 cm hypodensity in the dome. 1.1 cm central hypodensity.   Punctate angiomyolipoma upper pole right kidney. Small right renal cyst"  - discussed these findings with patient, she is aware of this and understands need for outpt monitoring with PCP.

## 2021-11-23 NOTE — PROGRESS NOTE ADULT - ASSESSMENT
Ms. Nunez is a 71 year old woman with PMH breast cancer s/p mastectomy, CREST syndrome (not on any medications), chronic UTIs, presenting with acute appendicitis    Plan    - will treat with trial of IV antibiotics, if does not progress will consider surgical intervention. This was discussed with patient and daughter  - high risk of poor wound healing with calcinosis secondary to CREST syndrome if she undergoes surgery  - tolerating regular diet  - continue Zosyn  - pain control with IV Tylenol  - VTE ppx with Lovenox    ACS  [id number]     Ms. Nunez is a 71 year old woman with PMH breast cancer s/p mastectomy, CREST syndrome (not on any medications), chronic UTIs, presenting with acute appendicitis    Plan    -IV abx   -symptoms improving, no surgery required at this time  - high risk of poor wound healing with calcinosis secondary to CREST syndrome if she undergoes surgery  - tolerating regular diet  - pain control with IV Tylenol  - VTE ppx with Lovenox  - discharge home with PO augmentin      ACS  [id number]

## 2021-11-23 NOTE — DISCHARGE NOTE PROVIDER - NSDCFUADDINST_GEN_ALL_CORE_FT
***on imaging you were found to have an incidental finding of a liver lesion and renal cyst; this will require further work up and management as an outpatient with you [facility] doctor; please call your primary care doctor to schedule a follow up appointment within 1-2 weeks to further discuss these results    Please utilize Tylenol for pain prior to using narcotics.  Do not take more than 4grams of Tylenol in a 24 hour period.     Taking narcotic pain medication may cause constipation. You can take over the counter stool softeners like Miralax or Senna to prevent this. Please do not take stool softeners if you are having loose bowel movements.    Some pain medications can also make you nauseous, sleepy, dizzy and itchy-these are the most common side effects.

## 2021-11-23 NOTE — CHART NOTE - NSCHARTNOTEFT_GEN_A_CORE
Incidental findings of a:      (1)  KIDNEY: Punctate angiomyolipoma upper pole right kidney      (2)  LIVER: 1 cm hypodensity in the dome. 1.1 cm central hypodensity. Both too small to characterize    were discussed with the patient. Patient states she is already aware and has been told that this is a manifestation of her CREST syndrome.     A report of the CT scan was given to the patient .     Patient was encouraged to bring the report with her to her PCP to discuss next steps. Her Primary Care Doctor is DELFINA ESCOBEDO MD [phone].     Acute Care Surgery   [id number] Incidental findings of a:      (1)  KIDNEY: Punctate angiomyolipoma upper pole right kidney      (2)  LIVER: 1 cm hypodensity in the dome. 1.1 cm central hypodensity. Both too small to characterize    were discussed with the patient. Patient states she is already aware and has been told that this is a manifestation of her CREST syndrome.     A report of the CT scan was given to the patient .     Patient was encouraged to bring the report with her to her PCP to discuss next steps. Her Primary Care Doctor is DELFINA CLEVELAND MD [phone].     Acute Care Surgery   [phone]

## 2021-11-23 NOTE — DISCHARGE NOTE PROVIDER - NSDCMRMEDTOKEN_GEN_ALL_CORE_FT
Claritin 10 mg oral tablet: 1 tab(s) orally once a day  diclofenac sodium 75 mg oral delayed release tablet: 1 tab(s) orally 2 times a day, As Needed -   levothyroxine 125 mcg (0.125 mg) oral capsule: 1 cap(s) orally once a day  Nasonex 50 mcg/inh nasal spray: 2 spray(s) nasal once a day  omeprazole 40 mg oral delayed release capsule: 1 cap(s) orally once a day  phenazopyridine 100 mg oral tablet:   rosuvastatin 5 mg oral tablet: 1 tab(s) orally once a day  traMADol 50 mg oral tablet: 1 tab(s) orally every 8 hours, As Needed  Vitamin D3 5000 intl units oral capsule: 2 cap(s) orally once a day   amoxicillin-clavulanate 875 mg-125 mg oral tablet: 1 tab(s) orally every 12 hours. Please take with food  Claritin 10 mg oral tablet: 1 tab(s) orally once a day  diclofenac sodium 75 mg oral delayed release tablet: 1 tab(s) orally 2 times a day, As Needed -   levothyroxine 125 mcg (0.125 mg) oral capsule: 1 cap(s) orally once a day  Nasonex 50 mcg/inh nasal spray: 2 spray(s) nasal once a day  omeprazole 40 mg oral delayed release capsule: 1 cap(s) orally once a day  phenazopyridine 100 mg oral tablet:   rosuvastatin 5 mg oral tablet: 1 tab(s) orally once a day  traMADol 50 mg oral tablet: 1 tab(s) orally every 8 hours, As Needed  Vitamin D3 5000 intl units oral capsule: 2 cap(s) orally once a day

## 2021-11-23 NOTE — PROGRESS NOTE ADULT - PROBLEM SELECTOR PLAN 1
CT w/out an abscess, w/ possible appendecolith  - medically managing as per surgery - zosyn iv and switch to augmentin on dc  - leukocytosis resolved

## 2021-11-23 NOTE — PROGRESS NOTE ADULT - ASSESSMENT
72 yo F PMH Crest Syndrome, breast ca s/p mastectomy, chronic UTIs, HLD p/w rlq pain adm with acute appendicitis, improving with medical management.

## 2021-11-23 NOTE — DISCHARGE NOTE PROVIDER - NSDCFUADDAPPT_GEN_ALL_CORE_FT
YOUR PRIMARY CARE DOCTOR--Please call for a follow up appointment in the next 1-2 weeks regarding your recent hospitalization

## 2021-11-23 NOTE — PROGRESS NOTE ADULT - PROBLEM SELECTOR PLAN 2
prior outpt labs shows hgb 11, however downtrending since admission  may be in setting of marrow suppression 2/2 infection  cbc stable, pt aware of findings  - check iron panel, ferritin, b12, folate - outpt follow up  - no s/s bleeding per pt.

## 2021-11-23 NOTE — PROGRESS NOTE ADULT - SUBJECTIVE AND OBJECTIVE BOX
ACS DAILY PROGRESS NOTE:       SUBJECTIVE/ROS: No acute events overnight          MEDICATIONS  (STANDING):  acetaminophen   IVPB .. 1000 milliGRAM(s) IV Intermittent every 8 hours  atorvastatin 20 milliGRAM(s) Oral at bedtime  cholecalciferol 1000 Unit(s) Oral daily  diclofenac 75 milliGRAM(s) Oral two times a day  enoxaparin Injectable 40 milliGRAM(s) SubCutaneous every 24 hours  fluticasone propionate 50 MICROgram(s)/spray Nasal Spray 2 Spray(s) Both Nostrils two times a day  lactobacillus acidophilus 1 Tablet(s) Oral daily  levothyroxine 125 MICROGram(s) Oral daily  loratadine 10 milliGRAM(s) Oral daily  pantoprazole    Tablet 40 milliGRAM(s) Oral before breakfast  phenazopyridine 100 milliGRAM(s) Oral <User Schedule>  piperacillin/tazobactam IVPB.. 3.375 Gram(s) IV Intermittent every 8 hours  polyethylene glycol 3350 17 Gram(s) Oral daily  sodium chloride 0.9% lock flush 3 milliLiter(s) IV Push every 8 hours    MEDICATIONS  (PRN):  HYDROmorphone  Injectable 0.5 milliGRAM(s) IV Push every 6 hours PRN Severe Pain (7 - 10)  oxymetazoline 0.05% Nasal Spray 1 Spray(s) Both Nostrils every 12 hours PRN Congestion  sodium chloride 0.65% Nasal 1 Spray(s) Both Nostrils two times a day PRN Nasal Congestion  traMADol 50 milliGRAM(s) Oral every 6 hours PRN Moderate Pain (4 - 6)      OBJECTIVE:    Vital Signs Last 24 Hrs  T(C): 36.4 (23 Nov 2021 01:16), Max: 36.9 (22 Nov 2021 09:30)  T(F): 97.6 (23 Nov 2021 01:16), Max: 98.4 (22 Nov 2021 09:30)  HR: 88 (23 Nov 2021 01:16) (72 - 88)  BP: 127/65 (23 Nov 2021 01:16) (112/75 - 128/60)  BP(mean): --  RR: 18 (23 Nov 2021 01:16) (18 - 18)  SpO2: 96% (23 Nov 2021 01:16) (94% - 99%)    Physical Exam:  Gen: Laying in bed, NAD  Resp: Unlabored breathing  Abd: soft, nontender, nondistended  Ext: WWP  Skin: No rashes      I&O's Detail    21 Nov 2021 07:01  -  22 Nov 2021 07:00  --------------------------------------------------------  IN:    IV PiggyBack: 450 mL    Lactated Ringers: 1800 mL  Total IN: 2250 mL    OUT:    Oral Fluid: 0 mL    Voided (mL): 1000 mL  Total OUT: 1000 mL    Total NET: 1250 mL      22 Nov 2021 07:01  -  23 Nov 2021 02:07  --------------------------------------------------------  IN:    IV PiggyBack: 500 mL    IV PiggyBack: 50 mL    IV PiggyBack: 200 mL    IV PiggyBack: 200 mL    Oral Fluid: 850 mL  Total IN: 1800 mL    OUT:    Voided (mL): 900 mL  Total OUT: 900 mL    Total NET: 900 mL          Daily     Daily     LABS:                        8.5    11.71 )-----------( 180      ( 22 Nov 2021 07:17 )             27.5     11-22    139  |  110<H>  |  10  ----------------------------<  102<H>  3.3<L>   |  19<L>  |  0.77    Ca    9.1      22 Nov 2021 07:17  Phos  2.6     11-22  Mg     1.8     11-22                    PHYSICAL EXAM:  Constitutional: well developed, well nourished, NAD  Eyes: anicteric  ENMT: normal facies, symmetric  Neck: supple  Respiratory: CTA bilaterally  Cardiovascular: RRR  Gastrointestinal: abdomen soft, nontender, nondistended. No obvious masses. No peritonitis  Extremities: FROM, warm  Neurological: intact, non-focal  Skin: no gross lesions  Lymph Nodes: no gross adenopathy  Musculoskeletal: equal strength bilateral upper and lower extremities  Psychiatric: oriented x 3; appropriate         ACS DAILY PROGRESS NOTE:       SUBJECTIVE/ROS: No acute events overnight.  Seen at bedside. States improvement of pain to RLQ.  Tolerated regular diet.  Denies nausea, vomiting.          MEDICATIONS  (STANDING):  acetaminophen   IVPB .. 1000 milliGRAM(s) IV Intermittent every 8 hours  atorvastatin 20 milliGRAM(s) Oral at bedtime  cholecalciferol 1000 Unit(s) Oral daily  diclofenac 75 milliGRAM(s) Oral two times a day  enoxaparin Injectable 40 milliGRAM(s) SubCutaneous every 24 hours  fluticasone propionate 50 MICROgram(s)/spray Nasal Spray 2 Spray(s) Both Nostrils two times a day  lactobacillus acidophilus 1 Tablet(s) Oral daily  levothyroxine 125 MICROGram(s) Oral daily  loratadine 10 milliGRAM(s) Oral daily  pantoprazole    Tablet 40 milliGRAM(s) Oral before breakfast  phenazopyridine 100 milliGRAM(s) Oral <User Schedule>  piperacillin/tazobactam IVPB.. 3.375 Gram(s) IV Intermittent every 8 hours  polyethylene glycol 3350 17 Gram(s) Oral daily  sodium chloride 0.9% lock flush 3 milliLiter(s) IV Push every 8 hours    MEDICATIONS  (PRN):  HYDROmorphone  Injectable 0.5 milliGRAM(s) IV Push every 6 hours PRN Severe Pain (7 - 10)  oxymetazoline 0.05% Nasal Spray 1 Spray(s) Both Nostrils every 12 hours PRN Congestion  sodium chloride 0.65% Nasal 1 Spray(s) Both Nostrils two times a day PRN Nasal Congestion  traMADol 50 milliGRAM(s) Oral every 6 hours PRN Moderate Pain (4 - 6)      OBJECTIVE:    Vital Signs Last 24 Hrs  T(C): 36.4 (23 Nov 2021 01:16), Max: 36.9 (22 Nov 2021 09:30)  T(F): 97.6 (23 Nov 2021 01:16), Max: 98.4 (22 Nov 2021 09:30)  HR: 88 (23 Nov 2021 01:16) (72 - 88)  BP: 127/65 (23 Nov 2021 01:16) (112/75 - 128/60)  RR: 18 (23 Nov 2021 01:16) (18 - 18)  SpO2: 96% (23 Nov 2021 01:16) (94% - 99%)    Physical Exam:  Gen: Laying in bed, NAD  Resp: Unlabored breathing  Abd: soft, nondistended, mild TTP to RLQ  Ext: WWP  Skin: No rashes      I&O's Detail    21 Nov 2021 07:01  -  22 Nov 2021 07:00  --------------------------------------------------------  IN:    IV PiggyBack: 450 mL    Lactated Ringers: 1800 mL  Total IN: 2250 mL    OUT:    Oral Fluid: 0 mL    Voided (mL): 1000 mL  Total OUT: 1000 mL    Total NET: 1250 mL      22 Nov 2021 07:01  -  23 Nov 2021 02:07  --------------------------------------------------------  IN:    IV PiggyBack: 500 mL    IV PiggyBack: 50 mL    IV PiggyBack: 200 mL    IV PiggyBack: 200 mL    Oral Fluid: 850 mL  Total IN: 1800 mL    OUT:    Voided (mL): 900 mL  Total OUT: 900 mL    Total NET: 900 mL      LABS:                        8.5    11.71 )-----------( 180      ( 22 Nov 2021 07:17 )             27.5     11-22    139  |  110<H>  |  10  ----------------------------<  102<H>  3.3<L>   |  19<L>  |  0.77    Ca    9.1      22 Nov 2021 07:17  Phos  2.6     11-22  Mg     1.8     11-22

## 2021-11-23 NOTE — DISCHARGE NOTE NURSING/CASE MANAGEMENT/SOCIAL WORK - PATIENT PORTAL LINK FT
You can access the FollowMyHealth Patient Portal offered by [facility] by registering at the following website: [email or web address]. By joining [employer]’s FollowMyHealth portal, you will also be able to view your health information using other applications (apps) compatible with our system.

## 2021-11-23 NOTE — DISCHARGE NOTE PROVIDER - HOSPITAL COURSE
71 year old woman with PMH breast cancer s/p mastectomy, CREST syndrome (not on any medications), chronic UTIs, presenting with one day of right lower quadrant pain. She reports a few days of malaise and feeling dehydrated, when she developed abdominal pain. She first attributed her pain to constipation, however had no relief from laxatives. She reports decreased appetite, but has not had nausea or vomiting. She denies fevers during this time. She has not had diarrhea or changes in urination. Upon presentation to the ED, patient is afebrile, normotensive, and tachycardic. Labs notable for WBC 16 and lactate 2.2. She was given 500cc bolus with improvement in HR and lactate to 0.8. CTAP demonstrated 1.1 cm thickened appendix without abscess.     Pt admitted to [facility] for acute appendicitis, made NPO/IVF. Due to presence of CREST syndrome, past history of poor wound healing and no evidence of peritonitis, perforation or abscess, decision made to proceed with conservative management with IV antibiotics.        Incidental finding of liver lesion and renal cyst, will follow up outpatient with PCP.  71 year old woman with PMH breast cancer s/p mastectomy, CREST syndrome (not on any medications), chronic UTIs, presenting with one day of right lower quadrant pain. She reports a few days of malaise and feeling dehydrated, when she developed abdominal pain. She first attributed her pain to constipation, however had no relief from laxatives. She reports decreased appetite, but has not had nausea or vomiting. She denies fevers during this time. She has not had diarrhea or changes in urination. Upon presentation to the ED, patient is afebrile, normotensive, and tachycardic. Labs notable for WBC 16 and lactate 2.2. She was given 500cc bolus with improvement in HR and lactate to 0.8. CTAP demonstrated acute appendicitis without abscess.     Pt admitted to [facility] for acute appendicitis without abscess, ct addended- to include possible punctate hypodensity within the proximal to mid appendix which may represent a small appendicolith, pt made NPO/IVF. Due to presence of CREST syndrome, past history of poor wound healing and no evidence of peritonitis, perforation or abscess, decision made to proceed with conservative management with IV antibiotics. Medicine consulted and assisted with co-management. 11/22 pt started on clears, tolerated and diet advanced to regular. Leukocytosis resolved, pt continued to recover appropriately and deemed stable for dc. Of note, incidental finding of liver lesion and renal cyst found on imaging, these will require op follow up with PCP.     Upon dc abx transitioned to*****************    On day of discharge, the patient was tolerating diet, ambulating well with assistance and pain was controlled. Pt to f/u with trauma surgeon and PMD within 1-2 weeks of dc.  71 year old woman with PMH breast cancer s/p mastectomy, CREST syndrome (not on any medications), chronic UTIs, presenting with one day of right lower quadrant pain. She reports a few days of malaise and feeling dehydrated, when she developed abdominal pain. She first attributed her pain to constipation, however had no relief from laxatives. She reports decreased appetite, but has not had nausea or vomiting. She denies fevers during this time. She has not had diarrhea or changes in urination. Upon presentation to the ED, patient is afebrile, normotensive, and tachycardic. Labs notable for WBC 16 and lactate 2.2. She was given 500cc bolus with improvement in HR and lactate to 0.8. CTAP demonstrated acute appendicitis without abscess.     Pt admitted to [facility] for acute appendicitis without abscess, ct addended- to include possible punctate hypodensity within the proximal to mid appendix which may represent a small appendicolith, pt made NPO/IVF. Due to presence of CREST syndrome, past history of poor wound healing and no evidence of peritonitis, perforation or abscess, decision made to proceed with conservative management with IV antibiotics. Medicine consulted and assisted with co-management. 11/22 pt started on clears, tolerated and diet advanced to regular. Leukocytosis resolved, pt continued to recover appropriately and deemed stable for dc. Of note, incidental finding of liver lesion and renal cyst found on imaging, these will require op follow up with PCP. Upon dc abx transitioned to po Augmentin.     On day of discharge, the patient was tolerating diet, ambulating well with assistance and pain was controlled. Pt to f/u with trauma surgeon and PMD within 1-2 weeks of dc.

## 2021-11-23 NOTE — DISCHARGE NOTE PROVIDER - CARE PROVIDER_API CALL
Jasbir Carlisle (MD)  Surgery; Surgical Critical Care  [address] [facility], [address]  [place] [address]  Phone: [phone]  Fax: [phone]  Follow Up Time: 2 weeks

## 2021-11-23 NOTE — DISCHARGE NOTE PROVIDER - NSDCACTIVITY_GEN_ALL_CORE
Showering allowed/Walking - Indoors allowed/Walking - Outdoors allowed/Follow Instructions Provided by your Surgical Team

## 2021-11-23 NOTE — DISCHARGE NOTE PROVIDER - NSDCCPCAREPLAN_GEN_ALL_CORE_FT
PRINCIPAL DISCHARGE DIAGNOSIS  Diagnosis: Acute appendicitis  Assessment and Plan of Treatment: -continue antibiotics as per discharge home medication list to complete course  -pain control as needed  ACTIVITY: If you are taking narcotic pain medication, please do NOT drive a car, operate machinery or make important decisions.  DIET: regular diet  NOTIFY YOUR SURGEON IF: You have any fever (over 100.4 F) or chills, persistent nausea/vomiting with inability to tolerate food or liquids, persistent diarrhea, or if your pain is not controlled on your discharge pain medications.  FOLLOW-UP:  1. Please call to schedule a follow up appointment with your Trauma Doctor within 1-2 weeks of discharge; [address] [facility] [address] [place] [address], phone #[phone]   2. Please follow up with your primary care physician within 1-2 weeks regarding your hospitalization.      SECONDARY DISCHARGE DIAGNOSES  Diagnosis: CREST (calcinosis, Raynaud's phenomenon, esophageal dysfunction, sclerodactyly, telangiectasia)  Assessment and Plan of Treatment: -outpatient follow up with your rheumatologist    Diagnosis: Anemia  Assessment and Plan of Treatment: -additional blood work was performed while you were in the hospital; please schedule a follow up with your primary care doctor to further discuss future management    Diagnosis: Imaging abnormalities  Assessment and Plan of Treatment: -incidental finding of liver lesion and renal cyst on imaging  -please call to schedule a follow up appointment with your primary care doctor to further discuss these findings and future management     PRINCIPAL DISCHARGE DIAGNOSIS  Diagnosis: Acute appendicitis  Assessment and Plan of Treatment: -You will be going home wtih a 10 day antibiotic course. Augmentin 875mg twice a day.   -pain control as needed  ACTIVITY: If you are taking narcotic pain medication, please do NOT drive a car, operate machinery or make important decisions.  DIET: regular diet  NOTIFY YOUR SURGEON IF: You have any fever (over 100.4 F) or chills, persistent nausea/vomiting with inability to tolerate food or liquids, persistent diarrhea, or if your pain is not controlled on your discharge pain medications.  FOLLOW-UP:  1. Please call to schedule a follow up appointment with your Trauma Doctor within 1-2 weeks of discharge; [address] [facility] [address] [place] [address], phone #[phone]   2. Please follow up with your primary care physician within 1-2 weeks regarding your hospitalization.      SECONDARY DISCHARGE DIAGNOSES  Diagnosis: CREST (calcinosis, Raynaud's phenomenon, esophageal dysfunction, sclerodactyly, telangiectasia)  Assessment and Plan of Treatment: -outpatient follow up with your rheumatologist    Diagnosis: Anemia  Assessment and Plan of Treatment: -additional blood work was performed while you were in the hospital; please schedule a follow up with your primary care doctor to further discuss future management    Diagnosis: Imaging abnormalities  Assessment and Plan of Treatment: -incidental finding of liver lesion and renal cyst on imaging  -please call to schedule a follow up appointment with your primary care doctor to further discuss these findings and future management

## 2021-11-23 NOTE — PROGRESS NOTE ADULT - SUBJECTIVE AND OBJECTIVE BOX
TRAUMA SURGERY [facility] MEDICINE PROGRESS NOTE    Nereida Burton MD  Division of Hospital Medicine  Pager [phone]  If no response or off hours, page [phone]    Patient is a 71y old  Female who presents with a chief complaint of acute appendicitis (23 Nov 2021 08:57)    SUBJECTIVE / OVERNIGHT EVENTS:  overnight no events  no n/v/f/chills, cp, sob  states her abd pain is 70% improved now  eating without issues    CAPILLARY BLOOD GLUCOSE        I&O's Summary    22 Nov 2021 07:01  -  23 Nov 2021 07:00  --------------------------------------------------------  IN: 2100 mL / OUT: 900 mL / NET: 1200 mL    23 Nov 2021 07:01  -  23 Nov 2021 13:22  --------------------------------------------------------  IN: 560 mL / OUT: 0 mL / NET: 560 mL      Vital Signs Last 24 Hrs  T(C): 36.3 (23 Nov 2021 12:50), Max: 37 (23 Nov 2021 09:00)  T(F): 97.4 (23 Nov 2021 12:50), Max: 98.6 (23 Nov 2021 09:00)  HR: 67 (23 Nov 2021 12:50) (65 - 88)  BP: 132/69 (23 Nov 2021 12:50) (113/73 - 132/69)  BP(mean): --  RR: 18 (23 Nov 2021 12:50) (18 - 18)  SpO2: 96% (23 Nov 2021 12:50) (94% - 99%)    PHYSICAL EXAM:  GENERAL:  Well appearing, in NAD  HEAD:  NCAT  EYES: PERRLA, conjunctiva clear  NECK: Supple, No JVD  CHEST/LUNG: CTA B/L. No w/r/r.  HEART: Reg rate. Normal S1, S2. No m/r/g.   ABDOMEN: SNTND. Mild ttp of rlq, improved from yesterday. Bowel sounds present  EXTREMITIES:  2+ Peripheral Pulses, No clubbing, cyanosis, edema.  PSYCH: AAOx3, appropriate affect    LABS:                        8.9    8.46  )-----------( 201      ( 23 Nov 2021 07:21 )             28.8     11-23    140  |  110<H>  |  9   ----------------------------<  113<H>  3.6   |  20<L>  |  0.83    Ca    9.2      23 Nov 2021 07:18  Phos  3.0     11-23  Mg     2.2     11-23        Culture - Urine (collected 20 Nov 2021 21:20)  Source: Clean Catch Clean Catch (Midstream)  Final Report (21 Nov 2021 17:23):    <10,000 CFU/mL Normal Urogenital Lucero        MEDICATIONS  (STANDING):  atorvastatin 20 milliGRAM(s) Oral at bedtime  cholecalciferol 1000 Unit(s) Oral daily  diclofenac 75 milliGRAM(s) Oral two times a day  enoxaparin Injectable 40 milliGRAM(s) SubCutaneous every 24 hours  fluticasone propionate 50 MICROgram(s)/spray Nasal Spray 2 Spray(s) Both Nostrils two times a day  lactobacillus acidophilus 1 Tablet(s) Oral daily  levothyroxine 125 MICROGram(s) Oral daily  loratadine 10 milliGRAM(s) Oral daily  pantoprazole    Tablet 40 milliGRAM(s) Oral before breakfast  phenazopyridine 100 milliGRAM(s) Oral <User Schedule>  piperacillin/tazobactam IVPB.. 3.375 Gram(s) IV Intermittent every 8 hours  polyethylene glycol 3350 17 Gram(s) Oral daily  sodium chloride 0.9% lock flush 3 milliLiter(s) IV Push every 8 hours    MEDICATIONS  (PRN):  HYDROmorphone  Injectable 0.5 milliGRAM(s) IV Push every 6 hours PRN Severe Pain (7 - 10)  oxymetazoline 0.05% Nasal Spray 1 Spray(s) Both Nostrils every 12 hours PRN Congestion  sodium chloride 0.65% Nasal 1 Spray(s) Both Nostrils two times a day PRN Nasal Congestion  traMADol 50 milliGRAM(s) Oral every 6 hours PRN Moderate Pain (4 - 6)

## 2021-12-01 ENCOUNTER — APPOINTMENT (OUTPATIENT)
Dept: TRAUMA SURGERY | Facility: CLINIC | Age: 71
End: 2021-12-01
Payer: COMMERCIAL

## 2021-12-01 PROCEDURE — 99212 OFFICE O/P EST SF 10 MIN: CPT

## 2021-12-01 NOTE — ASSESSMENT
[FreeTextEntry1] : This is a patient known to have CREST\par She presented to the E.D. about 1 week ago with acute appendicitis \par She did not want surgery at the time because she reports poor wound healing\par patient has been taking Augmentin\par patient presents to the office now - states she feels much improved and only minimal pain localized to the right lower quadrant.  no fevers\par On exam the abdomen is soft\par tender to deep palpation right lower quadrant\par I discussed with patient to consider appendectomy at this stage\par Patient prefers to continue with non operative approach\par I renewed the Augmentin for one additional week\par patient agrees if pain worsens or develops fever to come to hospital\par patient also agrees if pain does not resolve after one week to pursue appendectomy\par to follow up in one week.\par

## 2021-12-08 ENCOUNTER — APPOINTMENT (OUTPATIENT)
Dept: TRAUMA SURGERY | Facility: CLINIC | Age: 71
End: 2021-12-08

## 2021-12-13 NOTE — PATIENT PROFILE ADULT. - BILL OF RIGHTS/ADMISSION INFORMATION PROVIDED TO:
Tolerated infusion well. Therapy plan reviewed with patient. Verbalizes understanding. Reviewed AVS with patient, reviewed medication information, verbalizes good knowledge of current plan, and has no signs or symptoms to report at this time. Declines copy of AVS. Patient instructed on s/s infection/complication with PICC line to report and instructed on keeping PICC dressing clean, dry and intact patient verbalizes understanding. Next appointment scheduled.
Patient

## 2021-12-21 ENCOUNTER — OUTPATIENT (OUTPATIENT)
Dept: OUTPATIENT SERVICES | Facility: HOSPITAL | Age: 71
LOS: 1 days | End: 2021-12-21
Payer: COMMERCIAL

## 2021-12-21 ENCOUNTER — APPOINTMENT (OUTPATIENT)
Dept: CT IMAGING | Facility: CLINIC | Age: 71
End: 2021-12-21
Payer: COMMERCIAL

## 2021-12-21 ENCOUNTER — RESULT REVIEW (OUTPATIENT)
Age: 71
End: 2021-12-21

## 2021-12-21 DIAGNOSIS — Z98.890 OTHER SPECIFIED POSTPROCEDURAL STATES: Chronic | ICD-10-CM

## 2021-12-21 DIAGNOSIS — Z00.8 ENCOUNTER FOR OTHER GENERAL EXAMINATION: ICD-10-CM

## 2021-12-21 DIAGNOSIS — Z92.3 PERSONAL HISTORY OF IRRADIATION: Chronic | ICD-10-CM

## 2021-12-21 PROCEDURE — 74177 CT ABD & PELVIS W/CONTRAST: CPT | Mod: 26

## 2021-12-21 PROCEDURE — 74177 CT ABD & PELVIS W/CONTRAST: CPT

## 2021-12-27 ENCOUNTER — APPOINTMENT (OUTPATIENT)
Dept: TRAUMA SURGERY | Facility: CLINIC | Age: 71
End: 2021-12-27
Payer: COMMERCIAL

## 2021-12-27 PROCEDURE — 99202 OFFICE O/P NEW SF 15 MIN: CPT

## 2021-12-27 RX ORDER — MOXIFLOXACIN HYDROCHLORIDE TABLETS, 400 MG 400 MG/1
1 TABLET, FILM COATED ORAL
Qty: 14 | Refills: 0
Start: 2021-12-27 | End: 2022-01-02

## 2021-12-27 RX ORDER — METRONIDAZOLE 500 MG
1 TABLET ORAL
Qty: 21 | Refills: 0
Start: 2021-12-27 | End: 2022-01-02

## 2021-12-27 NOTE — HISTORY OF PRESENT ILLNESS
[FreeTextEntry1] : Patient seen and examined in the office today. In november she presented with acute appendicitis and given she had CREST syndrome decision was made by the patient she wanted non operative management. She continued to have intermittent right lower groin pain and repeat CT scan of abdomen and pelvis performed which demonstrated microperforated tip appendicitis with residual phlegom and a tiny gas and fluid collection. Patient also noted to have  a small right inguinal hernia containing peritoneal fat and trace fluid. \par \par Mrs. Nunez at this time feels fine. She is afebrile and tolerating a diet with no nausea or emesis. She says she has intermittent mild pain but it is more at the groin region than in the right lower quadrant. \par Given her CT scan results I prescribed cipro/flagyl given she was on augmentin and developed a microperforation. \par I told her that if her pain worsented or she experienced more severe symptoms she is to come to the ED or call the office. \par \par I discussed surgical intervention versus non-operative management. \par I told her to discuss it with her family and if she chooses to proceed we can schedule her in 6-8 weeks.

## 2022-01-11 ENCOUNTER — RESULT CHARGE (OUTPATIENT)
Age: 72
End: 2022-01-11

## 2022-01-13 ENCOUNTER — NON-APPOINTMENT (OUTPATIENT)
Age: 72
End: 2022-01-13

## 2022-01-13 ENCOUNTER — APPOINTMENT (OUTPATIENT)
Dept: INTERNAL MEDICINE | Facility: CLINIC | Age: 72
End: 2022-01-13
Payer: COMMERCIAL

## 2022-01-13 VITALS
HEART RATE: 74 BPM | HEIGHT: 61 IN | SYSTOLIC BLOOD PRESSURE: 130 MMHG | OXYGEN SATURATION: 96 % | RESPIRATION RATE: 18 BRPM | WEIGHT: 155 LBS | BODY MASS INDEX: 29.27 KG/M2 | DIASTOLIC BLOOD PRESSURE: 74 MMHG | TEMPERATURE: 97.5 F

## 2022-01-13 DIAGNOSIS — Z82.5 FAMILY HISTORY OF ASTHMA AND OTHER CHRONIC LOWER RESPIRATORY DISEASES: ICD-10-CM

## 2022-01-13 DIAGNOSIS — Z82.49 FAMILY HISTORY OF ISCHEMIC HEART DISEASE AND OTHER DISEASES OF THE CIRCULATORY SYSTEM: ICD-10-CM

## 2022-01-13 DIAGNOSIS — Z85.3 PERSONAL HISTORY OF MALIGNANT NEOPLASM OF BREAST: ICD-10-CM

## 2022-01-13 DIAGNOSIS — Z81.2 FAMILY HISTORY OF TOBACCO ABUSE AND DEPENDENCE: ICD-10-CM

## 2022-01-13 PROCEDURE — 99387 INIT PM E/M NEW PAT 65+ YRS: CPT | Mod: 25

## 2022-01-13 PROCEDURE — 93000 ELECTROCARDIOGRAM COMPLETE: CPT | Mod: 59

## 2022-01-13 RX ORDER — ASPIRIN 81 MG
TABLET,CHEWABLE ORAL
Refills: 0 | Status: DISCONTINUED | COMMUNITY
End: 2022-01-13

## 2022-01-13 RX ORDER — MOMETASONE FUROATE MONOHYDRATE 50 UG/1
SPRAY, METERED NASAL
Refills: 0 | Status: ACTIVE | COMMUNITY

## 2022-01-13 NOTE — HEALTH RISK ASSESSMENT
[No falls in past year] : Patient reported no falls in the past year [Fully functional (bathing, dressing, toileting, transferring, walking, feeding)] : Fully functional (bathing, dressing, toileting, transferring, walking, feeding) [Fully functional (using the telephone, shopping, preparing meals, housekeeping, doing laundry, using] : Fully functional and needs no help or supervision to perform IADLs (using the telephone, shopping, preparing meals, housekeeping, doing laundry, using transportation, managing medications and managing finances) [de-identified] : Walking, yoga. [de-identified] : Regular. [MammogramComments] : Results of most recent Mammogram/Breast US to be requested. [ColonoscopyComments] : Results to be requested from Dr. Wood's office.

## 2022-01-13 NOTE — HISTORY OF PRESENT ILLNESS
[FreeTextEntry1] : new patient annual wellness visit [de-identified] : New patient is a 71 year old female with a past medical history as below who presents for an annual wellness visit. Patient states she is taking all medications as prescribed and denies any adverse reactions or side effects. GERD has been well-managed on Omeprazole. Patient sees GYN, Dr. Mae 2x per year. She has a Mammogram, Breast US, and Transvaginal US annually. Patient's last screening colonoscopy was in 2018 with gastroenterologist, Dr. Wood. Patient notes recent admission to [facility], c/o RLQ pain diagnosed as secondary to acute appendicitis; no fever. As per trauma surgeon Dr. Carlisle, patient opted for non-operative management secondary to history of poor wound healing/CREST. Dr. Carlisle recommended continuing Augmentin and returning to the hospital if the pain persisted or worsened. Patient also recently saw surgeon, Dr. Riojas for further evaluation of right lower groin pain. CT Abdomen and Pelvis w/ IV Contrast dated 12/21/21 (ordered by gastroenterologist, Dr. Wood) revealed microperforated tip appendicitis with residual phlegmon and a tiny gas and fluid collection, diverticulum of the distal small bowel, and small right inguinal hernia containing peritoneal fat and trace fluid. Patient was subsequently prescribed Ciprofloxacin/Flagyl by Dr. Riojas. Patient is afebrile and denies nausea/vomiting after eating. She continues to note right lower groin pain. Patient notes history of right breast cancer, s/p lumpectomy and lymph node dissection; had been on Tamoxifen for 5 years. Patient is s/p right total knee replacement in 2017 with orthopedic surgeon, Dr. Farah. She is able to ambulate without significant issues. Patient sees rheumatologist, Dr. Goldberg. Patient notes having an Echocardiogram, Stress Test, and Angiogram in the past. Patient maintains a well-balanced diet and exercises regularly (45 minutes - 1 hour of walking 5 days per week, yoga). Patient received the flu vaccine for this season at [facility]. She received the Pneumonia Vaccine at [facility]. She has received the Tetanus Vaccine in the past 10 years. She has not received the Shingles (Shingrix) Vaccine secondary to cost. She has received 3 doses of the COVID-19 Vaccine (Pfizer).

## 2022-01-13 NOTE — PLAN
[FreeTextEntry1] : ENT/Immunology\par allergies - continue Claritin p.o.q.d. as directed; continue Nasonex q.d. as directed\par Pulmonary\par former smoker - check EKG (results as above) - continue smoking cessation\par Cardiology\par hyperlipidemia - continue Rosuvastatin Calcium 5mg p.o.q.d. as directed - continue low cholesterol/low fat diet - check FLP and LFTs\par Endocrinology\par hypothyroidism - continue Levothyroxine Sodium 125mcg p.o.q.d. as directed - check thyroid panel\par Continue Vitamin D-3 10,000 IU p.o.q.d. with a meal as directed - check Vitamin D\par Gastroenterology\par Results of last screening colonoscopy to be requested from gastroenterologist, Dr. Wood's office\par GERD - continue Omeprazole 40mg p.o.q.d. as directed - continue antireflux measures\par Gastroenterology/Infectious Disease\par appendicitis/right inguinal hernia - continue to follow up with trauma surgeons, Dr. Riojas/Dr. Carlisle-plan for eventual appendectomy\par Gynecology/Oncology\par right breast cancer - s/p lumpectomy and lymph node dissection; s/p 5 years on Tamoxifen - follow up with oncology/breast surgeon as directed \par Results of most recent Mammogram/Breast US to be requested\par Rheumatology\par arthralgias/ CREST - continue Diclofenac Sodium 75mg BID p.o.q.d. as directed - continue to follow up with rheumatologist, Dr. Goldberg\par Integumentary/Vascular/Gastroenterology\par CREST - continue Tramadol HCl 50mg TID p.o. p.r.n. as directed\par Urology\par Continue Phenazopyridine HCl 100mg p.o. as directed - continue to follow up with urology \par Immunization\par Shingrix - discussed, declined\par Patient's immunization records to be requested from [facility]\par \par check EKG (results as above)\par Rx given for lab work (female panel, hemoglobin A1C, thyroid panel, and UA w/ Reflex Urine Culture).

## 2022-01-13 NOTE — ADDENDUM
[FreeTextEntry1] : I, Terrence Dickerson, acted solely as scribe for Dr. Unruly Tomlinson DO on this date 01/13/2022 11:30AM .\par \par All medical record entries made by the Scribe were at my, Dr. Unruly Tomlinson DO direction and personally dictated by me on 01/13/2022 11:30AM. I have reviewed the chart and agree that the record accurately reflects my personal performance of the history, physical exam, assessment and plan. I have also personally directed, reviewed and agreed with the chart.\par

## 2022-01-13 NOTE — PHYSICAL EXAM
[No Acute Distress] : no acute distress [Well Nourished] : well nourished [Well Developed] : well developed [Well-Appearing] : well-appearing [Normal Sclera/Conjunctiva] : normal sclera/conjunctiva [PERRL] : pupils equal round and reactive to light [EOMI] : extraocular movements intact [Normal Outer Ear/Nose] : the outer ears and nose were normal in appearance [Normal Oropharynx] : the oropharynx was normal [No JVD] : no jugular venous distention [No Lymphadenopathy] : no lymphadenopathy [Supple] : supple [Thyroid Normal, No Nodules] : the thyroid was normal and there were no nodules present [No Respiratory Distress] : no respiratory distress  [No Accessory Muscle Use] : no accessory muscle use [Clear to Auscultation] : lungs were clear to auscultation bilaterally [Normal Rate] : normal rate  [Regular Rhythm] : with a regular rhythm [Normal S1, S2] : normal S1 and S2 [No Murmur] : no murmur heard [No Carotid Bruits] : no carotid bruits [No Abdominal Bruit] : a ~M bruit was not heard ~T in the abdomen [No Varicosities] : no varicosities [Pedal Pulses Present] : the pedal pulses are present [No Edema] : there was no peripheral edema [No Palpable Aorta] : no palpable aorta [No Extremity Clubbing/Cyanosis] : no extremity clubbing/cyanosis [Soft] : abdomen soft [Non-distended] : non-distended [No Masses] : no abdominal mass palpated [No HSM] : no HSM [Normal Bowel Sounds] : normal bowel sounds [Normal Posterior Cervical Nodes] : no posterior cervical lymphadenopathy [Normal Anterior Cervical Nodes] : no anterior cervical lymphadenopathy [No CVA Tenderness] : no CVA  tenderness [No Spinal Tenderness] : no spinal tenderness [No Joint Swelling] : no joint swelling [Grossly Normal Strength/Tone] : grossly normal strength/tone [No Rash] : no rash [Coordination Grossly Intact] : coordination grossly intact [No Focal Deficits] : no focal deficits [Normal Gait] : normal gait [Deep Tendon Reflexes (DTR)] : deep tendon reflexes were 2+ and symmetric [Normal Affect] : the affect was normal [Normal Insight/Judgement] : insight and judgment were intact [Normal Voice/Communication] : normal voice/communication [Normal Supraclavicular Nodes] : no supraclavicular lymphadenopathy [Speech Grossly Normal] : speech grossly normal [Memory Grossly Normal] : memory grossly normal [Alert and Oriented x3] : oriented to person, place, and time [Normal Mood] : the mood was normal [de-identified] : oral cavity telangectasias [de-identified] : done by GYN (Dr. Mae) [de-identified] : overweight; +RLQ tenderness to palpation: approximate area of McBurney's point; no rebound or guarding [de-identified] : telangiectasias  diffusely

## 2022-01-13 NOTE — ASSESSMENT
[FreeTextEntry1] : New patient is a 71 year old female with a past medical history as above who presents for an annual wellness visit.\par

## 2022-01-25 ENCOUNTER — NON-APPOINTMENT (OUTPATIENT)
Age: 72
End: 2022-01-25

## 2022-02-03 ENCOUNTER — OUTPATIENT (OUTPATIENT)
Dept: OUTPATIENT SERVICES | Facility: HOSPITAL | Age: 72
LOS: 1 days | End: 2022-02-03
Payer: COMMERCIAL

## 2022-02-03 VITALS — WEIGHT: 153 LBS | HEIGHT: 61 IN

## 2022-02-03 DIAGNOSIS — K35.32 ACUTE APPENDICITIS WITH PERFORATION, LOCALIZED PERITONITIS, AND GANGRENE, WITHOUT ABSCESS: ICD-10-CM

## 2022-02-03 DIAGNOSIS — Z92.3 PERSONAL HISTORY OF IRRADIATION: Chronic | ICD-10-CM

## 2022-02-03 DIAGNOSIS — Z01.818 ENCOUNTER FOR OTHER PREPROCEDURAL EXAMINATION: ICD-10-CM

## 2022-02-03 DIAGNOSIS — Z87.19 PERSONAL HISTORY OF OTHER DISEASES OF THE DIGESTIVE SYSTEM: ICD-10-CM

## 2022-02-03 DIAGNOSIS — Z96.659 PRESENCE OF UNSPECIFIED ARTIFICIAL KNEE JOINT: Chronic | ICD-10-CM

## 2022-02-03 DIAGNOSIS — Z98.890 OTHER SPECIFIED POSTPROCEDURAL STATES: Chronic | ICD-10-CM

## 2022-02-03 LAB
ANION GAP SERPL CALC-SCNC: 12 MMOL/L — SIGNIFICANT CHANGE UP (ref 5–17)
BUN SERPL-MCNC: 29 MG/DL — HIGH (ref 7–23)
CALCIUM SERPL-MCNC: 9.5 MG/DL — SIGNIFICANT CHANGE UP (ref 8.4–10.5)
CHLORIDE SERPL-SCNC: 108 MMOL/L — SIGNIFICANT CHANGE UP (ref 96–108)
CO2 SERPL-SCNC: 19 MMOL/L — LOW (ref 22–31)
CREAT SERPL-MCNC: 1.14 MG/DL — SIGNIFICANT CHANGE UP (ref 0.5–1.3)
GLUCOSE SERPL-MCNC: 114 MG/DL — HIGH (ref 70–99)
HCT VFR BLD CALC: 34.5 % — SIGNIFICANT CHANGE UP (ref 34.5–45)
HGB BLD-MCNC: 10.5 G/DL — LOW (ref 11.5–15.5)
MCHC RBC-ENTMCNC: 27.3 PG — SIGNIFICANT CHANGE UP (ref 27–34)
MCHC RBC-ENTMCNC: 30.4 GM/DL — LOW (ref 32–36)
MCV RBC AUTO: 89.6 FL — SIGNIFICANT CHANGE UP (ref 80–100)
NRBC # BLD: 0 /100 WBCS — SIGNIFICANT CHANGE UP (ref 0–0)
PLATELET # BLD AUTO: 264 K/UL — SIGNIFICANT CHANGE UP (ref 150–400)
POTASSIUM SERPL-MCNC: 4.1 MMOL/L — SIGNIFICANT CHANGE UP (ref 3.5–5.3)
POTASSIUM SERPL-SCNC: 4.1 MMOL/L — SIGNIFICANT CHANGE UP (ref 3.5–5.3)
RBC # BLD: 3.85 M/UL — SIGNIFICANT CHANGE UP (ref 3.8–5.2)
RBC # FLD: 15.5 % — HIGH (ref 10.3–14.5)
SODIUM SERPL-SCNC: 139 MMOL/L — SIGNIFICANT CHANGE UP (ref 135–145)
WBC # BLD: 11.63 K/UL — HIGH (ref 3.8–10.5)
WBC # FLD AUTO: 11.63 K/UL — HIGH (ref 3.8–10.5)

## 2022-02-03 PROCEDURE — 80048 BASIC METABOLIC PNL TOTAL CA: CPT

## 2022-02-03 PROCEDURE — 85027 COMPLETE CBC AUTOMATED: CPT

## 2022-02-03 PROCEDURE — G0463: CPT

## 2022-02-03 PROCEDURE — 36415 COLL VENOUS BLD VENIPUNCTURE: CPT

## 2022-02-03 RX ORDER — CEFOTETAN DISODIUM 1 G
2 VIAL (EA) INJECTION ONCE
Refills: 0 | Status: DISCONTINUED | OUTPATIENT
Start: 2022-02-11 | End: 2022-02-11

## 2022-02-03 NOTE — H&P PST ADULT - NSICDXPASTSURGICALHX_GEN_ALL_CORE_FT
PAST SURGICAL HISTORY:  H/O lumpectomy of right breast in 2005 with removal of 2 lymph nodes    S/P knee replacement right    S/P radiation therapy for 9 weeks

## 2022-02-03 NOTE — H&P PST ADULT - FALL HARM RISK - UNIVERSAL INTERVENTIONS
Bed in lowest position, wheels locked, appropriate side rails in place/Call bell, personal items and telephone in reach/Instruct patient to call for assistance before getting out of bed or chair/Non-slip footwear when patient is out of bed/[place] to call system/Physically safe environment - no spills, clutter or unnecessary equipment/Purposeful Proactive Rounding/Room/bathroom lighting operational, light cord in reach

## 2022-02-03 NOTE — H&P PST ADULT - WILL THE PATIENT ACCEPT THE PFIZER COVID-19 VACCINE IF ELIGIBLE AND IT IS AVAILABLE?
Not applicable Rosalino Lomeli called from [facility] requesting call back to confirm pt has been screened for HLA- to ensure he's not allergic to Triumeq prescribed by Dr. Agnieszka Lemon. Please advise at [phone].  Ldm

## 2022-02-03 NOTE — H&P PST ADULT - HISTORY OF PRESENT ILLNESS
This is a 70 y/o female PMH Crest syndrome, scleroderma,  hypothyroid, right breast CA 2005, S/P radiation treatment and lumpectomy, lymphedema, abscess of the appendix, was treated with antibiotics.  Presents today for laparoscopic appendectomy, possible open. This is a 70 y/o female PMH Crest syndrome, scleroderma,  hypothyroid, right breast CA 2005, S/P radiation treatment and lumpectomy, lymphedema, admitted to [facility] for appendicitis, was treated with IV Zosyn and oral antibiotics.  Presents today for laparoscopic appendectomy, possible open.

## 2022-02-03 NOTE — H&P PST ADULT - NSICDXPASTMEDICALHX_GEN_ALL_CORE_FT
PAST MEDICAL HISTORY:  Bladder infection, chronic     Breast cancer right breast CA in 2005    Chronic sinusitis     CREST (calcinosis, Raynaud's phenomenon, esophageal dysfunction, sclerodactyly, telangiectasia) affecting lungs, bladder, kidney's, esophagus, skin, eyes and ears    Hypothyroid     Lymphedema right arm    Primary osteoarthritis of right knee     Scleroderma     Varicose veins      PAST MEDICAL HISTORY:  Appendicitis     Bladder infection, chronic     Breast cancer right breast CA in 2005    Chronic sinusitis     CREST (calcinosis, Raynaud's phenomenon, esophageal dysfunction, sclerodactyly, telangiectasia) affecting lungs, bladder, kidney's, esophagus, skin, eyes and ears    Hypothyroid     Lymphedema right arm    Primary osteoarthritis of right knee     Scleroderma     Varicose veins

## 2022-02-08 ENCOUNTER — OUTPATIENT (OUTPATIENT)
Dept: OUTPATIENT SERVICES | Facility: HOSPITAL | Age: 72
LOS: 1 days | End: 2022-02-08
Payer: COMMERCIAL

## 2022-02-08 ENCOUNTER — APPOINTMENT (OUTPATIENT)
Dept: INTERNAL MEDICINE | Facility: CLINIC | Age: 72
End: 2022-02-08
Payer: COMMERCIAL

## 2022-02-08 VITALS
TEMPERATURE: 97.5 F | WEIGHT: 155.56 LBS | RESPIRATION RATE: 16 BRPM | SYSTOLIC BLOOD PRESSURE: 122 MMHG | OXYGEN SATURATION: 97 % | DIASTOLIC BLOOD PRESSURE: 70 MMHG | HEIGHT: 61 IN | HEART RATE: 74 BPM | BODY MASS INDEX: 29.37 KG/M2

## 2022-02-08 DIAGNOSIS — Z98.890 OTHER SPECIFIED POSTPROCEDURAL STATES: Chronic | ICD-10-CM

## 2022-02-08 DIAGNOSIS — Z01.818 ENCOUNTER FOR OTHER PREPROCEDURAL EXAMINATION: ICD-10-CM

## 2022-02-08 DIAGNOSIS — Z92.3 PERSONAL HISTORY OF IRRADIATION: Chronic | ICD-10-CM

## 2022-02-08 DIAGNOSIS — Z11.52 ENCOUNTER FOR SCREENING FOR COVID-19: ICD-10-CM

## 2022-02-08 DIAGNOSIS — Z96.659 PRESENCE OF UNSPECIFIED ARTIFICIAL KNEE JOINT: Chronic | ICD-10-CM

## 2022-02-08 LAB — SARS-COV-2 RNA SPEC QL NAA+PROBE: SIGNIFICANT CHANGE UP

## 2022-02-08 PROCEDURE — 99214 OFFICE O/P EST MOD 30 MIN: CPT

## 2022-02-08 PROCEDURE — U0003: CPT

## 2022-02-08 PROCEDURE — C9803: CPT

## 2022-02-08 PROCEDURE — U0005: CPT

## 2022-02-09 NOTE — PHYSICAL EXAM
[No Acute Distress] : no acute distress [Well Nourished] : well nourished [Well Developed] : well developed [Well-Appearing] : well-appearing [Normal Sclera/Conjunctiva] : normal sclera/conjunctiva [PERRL] : pupils equal round and reactive to light [EOMI] : extraocular movements intact [Normal Outer Ear/Nose] : the outer ears and nose were normal in appearance [Normal Oropharynx] : the oropharynx was normal [No JVD] : no jugular venous distention [No Lymphadenopathy] : no lymphadenopathy [Supple] : supple [Thyroid Normal, No Nodules] : the thyroid was normal and there were no nodules present [No Respiratory Distress] : no respiratory distress  [No Accessory Muscle Use] : no accessory muscle use [Clear to Auscultation] : lungs were clear to auscultation bilaterally [Normal Rate] : normal rate  [Regular Rhythm] : with a regular rhythm [Normal S1, S2] : normal S1 and S2 [No Murmur] : no murmur heard [No Carotid Bruits] : no carotid bruits [No Abdominal Bruit] : a ~M bruit was not heard ~T in the abdomen [No Varicosities] : no varicosities [Pedal Pulses Present] : the pedal pulses are present [No Edema] : there was no peripheral edema [No Palpable Aorta] : no palpable aorta [No Extremity Clubbing/Cyanosis] : no extremity clubbing/cyanosis [Soft] : abdomen soft [Non-distended] : non-distended [No Masses] : no abdominal mass palpated [No HSM] : no HSM [Normal Bowel Sounds] : normal bowel sounds [Normal Posterior Cervical Nodes] : no posterior cervical lymphadenopathy [Normal Anterior Cervical Nodes] : no anterior cervical lymphadenopathy [No CVA Tenderness] : no CVA  tenderness [No Spinal Tenderness] : no spinal tenderness [No Joint Swelling] : no joint swelling [Grossly Normal Strength/Tone] : grossly normal strength/tone [No Rash] : no rash [Coordination Grossly Intact] : coordination grossly intact [No Focal Deficits] : no focal deficits [Normal Gait] : normal gait [Deep Tendon Reflexes (DTR)] : deep tendon reflexes were 2+ and symmetric [Normal Affect] : the affect was normal [Normal Insight/Judgement] : insight and judgment were intact [de-identified] : overweight; +RLQ tenderness to palpation: approximate area of McBurney's point; no rebound or guarding

## 2022-02-09 NOTE — ADDENDUM
[FreeTextEntry1] : I, Terrence Dickerson, acted solely as scribe for Dr. Unruly Tomlinson DO on this date 02/08/2022  3:30PM .\par \par All medical record entries made by the Scribe were at my, Dr. Unruly Tomlinson DO direction and personally dictated by me on 02/08/2022  3:30PM. I have reviewed the chart and agree that the record accurately reflects my personal performance of the history, physical exam, assessment and plan. I have also personally directed, reviewed and agreed with the chart.\par

## 2022-02-09 NOTE — RESULTS/DATA
[] : results reviewed [de-identified] : Normal except elevated WBC (11.63) and low HGB (10.5). [de-identified] : BMP normal except elevated BUN (29; likely secondary to dehydration) and elevated blood-glucose (114; likely secondary to not fasting).\par  [de-identified] : NSR at 72/min (1/13/22 study)

## 2022-02-09 NOTE — ASSESSMENT
[Patient Optimized for Surgery] : Patient optimized for surgery [No Further Testing Recommended] : no further testing recommended [Modify medications prior to procedure] : Modify medications prior to procedure [FreeTextEntry4] : The patient is a 71 year-old female who is a low risk surgical patient with good functional capacity going for an intermediate risk surgical procedure. [FreeTextEntry7] : The patient was advised to hold Diclofenac and instructed not to take any other NSAIDs (Aspirin, Aleve, Advil, or Motrin). She was instructed to take Tramadol and Tylenol as needed for abdominal pain. She was advised to take the medications normally taken in the AM on the morning of the procedure with sips of water.

## 2022-02-09 NOTE — HISTORY OF PRESENT ILLNESS
[No Pertinent Cardiac History] : no history of aortic stenosis, atrial fibrillation, coronary artery disease, recent myocardial infarction, or implantable device/pacemaker [No Pertinent Pulmonary History] : no history of asthma, COPD, sleep apnea, or smoking [No Adverse Anesthesia Reaction] : no adverse anesthesia reaction in self or family member [(Patient denies any chest pain, claudication, dyspnea on exertion, orthopnea, palpitations or syncope)] : Patient denies any chest pain, claudication, dyspnea on exertion, orthopnea, palpitations or syncope [Good (7-10 METs)] : Good (7-10 METs) [NSAIDs: _____] : NSAIDs: [unfilled] [Chronic Anticoagulation] : no chronic anticoagulation [Chronic Kidney Disease] : no chronic kidney disease [Diabetes] : no diabetes [FreeTextEntry1] : appendectomy [FreeTextEntry2] : 2/11/22 [FreeTextEntry3] : Dr. Jasbir Carlisle [FreeTextEntry4] : Patient is a 71 year-old female with a past medical history as below who presents for preoperative evaluation prior to appendectomy. The procedure will be performed by Dr. Jasbir Carlisle at [facility]. The patient has no history of allergy or adverse reaction to anesthesia. The patient can walk many blocks and can walk up 1-2 flights of stairs without dyspnea on exertion. She denies chest pain or palpitations. Echocardiogram and Coronary Angiogram in 2015 were negative. Pre-surgical testing (EKG, blood work) was done last week at the hospital.

## 2022-02-09 NOTE — PLAN
[FreeTextEntry1] : \par 1. The patient was instructed to stop eating prior to midnight the evening before the procedure.\par 2. The patient was advised to hold Diclofenac and instructed not to take any other NSAIDs (Aspirin, Aleve, Advil, or Motrin). She was instructed to take Tramadol and Tylenol as needed for abdominal pain. She was advised to take the medications normally taken in the AM on the morning of the procedure with sips of water.\par 3. There is no medical contraindication to the planned procedure and the patient is therefore medically optimized/cleared for the procedure.

## 2022-02-10 ENCOUNTER — TRANSCRIPTION ENCOUNTER (OUTPATIENT)
Age: 72
End: 2022-02-10

## 2022-02-11 ENCOUNTER — TRANSCRIPTION ENCOUNTER (OUTPATIENT)
Age: 72
End: 2022-02-11

## 2022-02-11 ENCOUNTER — RESULT REVIEW (OUTPATIENT)
Age: 72
End: 2022-02-11

## 2022-02-11 ENCOUNTER — APPOINTMENT (OUTPATIENT)
Dept: TRAUMA SURGERY | Facility: HOSPITAL | Age: 72
End: 2022-02-11

## 2022-02-11 ENCOUNTER — INPATIENT (INPATIENT)
Facility: HOSPITAL | Age: 72
LOS: 0 days | Discharge: HOME | DRG: 340 | End: 2022-02-11
Attending: SURGERY | Admitting: SURGERY
Payer: COMMERCIAL

## 2022-02-11 VITALS
DIASTOLIC BLOOD PRESSURE: 57 MMHG | HEART RATE: 91 BPM | SYSTOLIC BLOOD PRESSURE: 116 MMHG | OXYGEN SATURATION: 95 % | RESPIRATION RATE: 18 BRPM

## 2022-02-11 VITALS
SYSTOLIC BLOOD PRESSURE: 131 MMHG | DIASTOLIC BLOOD PRESSURE: 82 MMHG | WEIGHT: 153 LBS | HEIGHT: 61 IN | RESPIRATION RATE: 16 BRPM | OXYGEN SATURATION: 98 % | TEMPERATURE: 98 F | HEART RATE: 80 BPM

## 2022-02-11 DIAGNOSIS — Z96.659 PRESENCE OF UNSPECIFIED ARTIFICIAL KNEE JOINT: Chronic | ICD-10-CM

## 2022-02-11 DIAGNOSIS — Z98.890 OTHER SPECIFIED POSTPROCEDURAL STATES: Chronic | ICD-10-CM

## 2022-02-11 DIAGNOSIS — K35.32 ACUTE APPENDICITIS WITH PERFORATION, LOCALIZED PERITONITIS, AND GANGRENE, WITHOUT ABSCESS: ICD-10-CM

## 2022-02-11 DIAGNOSIS — Z92.3 PERSONAL HISTORY OF IRRADIATION: Chronic | ICD-10-CM

## 2022-02-11 PROCEDURE — C9399: CPT

## 2022-02-11 PROCEDURE — 44970 LAPAROSCOPY APPENDECTOMY: CPT

## 2022-02-11 PROCEDURE — C1889: CPT

## 2022-02-11 PROCEDURE — 88304 TISSUE EXAM BY PATHOLOGIST: CPT

## 2022-02-11 PROCEDURE — 88304 TISSUE EXAM BY PATHOLOGIST: CPT | Mod: 26

## 2022-02-11 DEVICE — STAPLER COVIDIEN TRI-STAPLE 45MM PURPLE RELOAD: Type: IMPLANTABLE DEVICE | Site: ABDOMINAL | Status: FUNCTIONAL

## 2022-02-11 RX ORDER — LORATADINE 10 MG/1
1 TABLET ORAL
Qty: 0 | Refills: 0 | DISCHARGE

## 2022-02-11 RX ORDER — SODIUM CHLORIDE 9 MG/ML
3 INJECTION INTRAMUSCULAR; INTRAVENOUS; SUBCUTANEOUS EVERY 8 HOURS
Refills: 0 | Status: DISCONTINUED | OUTPATIENT
Start: 2022-02-11 | End: 2022-02-11

## 2022-02-11 RX ORDER — HYDROMORPHONE HYDROCHLORIDE 2 MG/ML
0.5 INJECTION INTRAMUSCULAR; INTRAVENOUS; SUBCUTANEOUS ONCE
Refills: 0 | Status: DISCONTINUED | OUTPATIENT
Start: 2022-02-11 | End: 2022-02-11

## 2022-02-11 RX ORDER — OXYCODONE HYDROCHLORIDE 5 MG/1
1 TABLET ORAL
Qty: 12 | Refills: 0
Start: 2022-02-11 | End: 2022-02-16

## 2022-02-11 RX ORDER — HYDROMORPHONE HYDROCHLORIDE 2 MG/ML
1 INJECTION INTRAMUSCULAR; INTRAVENOUS; SUBCUTANEOUS
Refills: 0 | Status: DISCONTINUED | OUTPATIENT
Start: 2022-02-11 | End: 2022-02-11

## 2022-02-11 RX ORDER — MOMETASONE FUROATE 50 UG/1
2 SPRAY NASAL
Qty: 0 | Refills: 0 | DISCHARGE

## 2022-02-11 RX ORDER — LIDOCAINE HCL 20 MG/ML
0.2 VIAL (ML) INJECTION ONCE
Refills: 0 | Status: DISCONTINUED | OUTPATIENT
Start: 2022-02-11 | End: 2022-02-11

## 2022-02-11 RX ORDER — ROSUVASTATIN CALCIUM 5 MG/1
1 TABLET ORAL
Qty: 0 | Refills: 0 | DISCHARGE

## 2022-02-11 RX ORDER — LEVOTHYROXINE SODIUM 125 MCG
1 TABLET ORAL
Qty: 0 | Refills: 0 | DISCHARGE

## 2022-02-11 RX ORDER — SODIUM CHLORIDE 9 MG/ML
1000 INJECTION, SOLUTION INTRAVENOUS
Refills: 0 | Status: DISCONTINUED | OUTPATIENT
Start: 2022-02-11 | End: 2022-02-11

## 2022-02-11 RX ORDER — OMEPRAZOLE 10 MG/1
1 CAPSULE, DELAYED RELEASE ORAL
Qty: 0 | Refills: 0 | DISCHARGE

## 2022-02-11 RX ORDER — OXYCODONE HYDROCHLORIDE 5 MG/1
5 TABLET ORAL ONCE
Refills: 0 | Status: DISCONTINUED | OUTPATIENT
Start: 2022-02-11 | End: 2022-02-11

## 2022-02-11 RX ORDER — IBUPROFEN 200 MG
2 TABLET ORAL
Qty: 0 | Refills: 0 | DISCHARGE

## 2022-02-11 RX ORDER — ONDANSETRON 8 MG/1
4 TABLET, FILM COATED ORAL ONCE
Refills: 0 | Status: DISCONTINUED | OUTPATIENT
Start: 2022-02-11 | End: 2022-02-11

## 2022-02-11 RX ORDER — ACETAMINOPHEN 500 MG
1000 TABLET ORAL ONCE
Refills: 0 | Status: DISCONTINUED | OUTPATIENT
Start: 2022-02-11 | End: 2022-02-11

## 2022-02-11 RX ORDER — OXYCODONE HYDROCHLORIDE 5 MG/1
1 TABLET ORAL
Qty: 8 | Refills: 0
Start: 2022-02-11

## 2022-02-11 RX ORDER — FENTANYL CITRATE 50 UG/ML
50 INJECTION INTRAVENOUS ONCE
Refills: 0 | Status: DISCONTINUED | OUTPATIENT
Start: 2022-02-11 | End: 2022-02-11

## 2022-02-11 RX ORDER — CHLORHEXIDINE GLUCONATE 213 G/1000ML
1 SOLUTION TOPICAL ONCE
Refills: 0 | Status: DISCONTINUED | OUTPATIENT
Start: 2022-02-11 | End: 2022-02-11

## 2022-02-11 RX ORDER — CHOLECALCIFEROL (VITAMIN D3) 125 MCG
2 CAPSULE ORAL
Qty: 0 | Refills: 0 | DISCHARGE

## 2022-02-11 RX ORDER — DICLOFENAC SODIUM 75 MG/1
1 TABLET, DELAYED RELEASE ORAL
Qty: 0 | Refills: 0 | DISCHARGE

## 2022-02-11 RX ORDER — HYDROMORPHONE HYDROCHLORIDE 2 MG/ML
0.5 INJECTION INTRAMUSCULAR; INTRAVENOUS; SUBCUTANEOUS
Refills: 0 | Status: DISCONTINUED | OUTPATIENT
Start: 2022-02-11 | End: 2022-02-11

## 2022-02-11 RX ORDER — PHENAZOPYRIDINE HCL 100 MG
0 TABLET ORAL
Qty: 0 | Refills: 0 | DISCHARGE

## 2022-02-11 RX ORDER — TRAMADOL HYDROCHLORIDE 50 MG/1
1 TABLET ORAL
Qty: 0 | Refills: 0 | DISCHARGE

## 2022-02-11 RX ORDER — ONDANSETRON 8 MG/1
8 TABLET, FILM COATED ORAL ONCE
Refills: 0 | Status: DISCONTINUED | OUTPATIENT
Start: 2022-02-11 | End: 2022-02-11

## 2022-02-11 RX ORDER — ACETAMINOPHEN 500 MG
3 TABLET ORAL
Qty: 0 | Refills: 0 | DISCHARGE

## 2022-02-11 RX ADMIN — HYDROMORPHONE HYDROCHLORIDE 0.5 MILLIGRAM(S): 2 INJECTION INTRAMUSCULAR; INTRAVENOUS; SUBCUTANEOUS at 16:15

## 2022-02-11 RX ADMIN — OXYCODONE HYDROCHLORIDE 5 MILLIGRAM(S): 5 TABLET ORAL at 19:30

## 2022-02-11 RX ADMIN — HYDROMORPHONE HYDROCHLORIDE 0.5 MILLIGRAM(S): 2 INJECTION INTRAMUSCULAR; INTRAVENOUS; SUBCUTANEOUS at 14:45

## 2022-02-11 RX ADMIN — OXYCODONE HYDROCHLORIDE 5 MILLIGRAM(S): 5 TABLET ORAL at 18:55

## 2022-02-11 RX ADMIN — HYDROMORPHONE HYDROCHLORIDE 0.5 MILLIGRAM(S): 2 INJECTION INTRAMUSCULAR; INTRAVENOUS; SUBCUTANEOUS at 16:08

## 2022-02-11 RX ADMIN — HYDROMORPHONE HYDROCHLORIDE 0.5 MILLIGRAM(S): 2 INJECTION INTRAMUSCULAR; INTRAVENOUS; SUBCUTANEOUS at 15:15

## 2022-02-11 NOTE — ASU DISCHARGE PLAN (ADULT/PEDIATRIC) - CARE PROVIDER_API CALL
Jasbir Carlisle (MD)  Surgery; Surgical Critical Care  [address], [address]  [place] [address]  Phone: [phone]  Fax: [phone]  Follow Up Time: 2 weeks

## 2022-02-11 NOTE — DISCHARGE NOTE NURSING/CASE MANAGEMENT/SOCIAL WORK - PATIENT PORTAL LINK FT
You can access the FollowMyHealth Patient Portal offered by [facility] by registering at the following website: [email or web address]. By joining [facility]’s FollowMyHealth portal, you will also be able to view your health information using other applications (apps) compatible with our system.

## 2022-02-11 NOTE — DISCHARGE NOTE NURSING/CASE MANAGEMENT/SOCIAL WORK - NSDCPEFALRISK_GEN_ALL_CORE
For information on Fall & Injury Prevention, visit: [email or web address] OR  [email or web address] OR  https://www.cdc.gov/steadi/patient.html

## 2022-02-11 NOTE — PRE-ANESTHESIA EVALUATION ADULT - NSANTHADDINFOFT_GEN_ALL_CORE
Risks and indications for general anesthesia were discussed including but not limited to nausea, throat soreness/dental injury, anaphylaxis, myocardial infarction and stroke. These risks were acknowledged and accepted by patient, all of her questions were answered as well.

## 2022-02-11 NOTE — ASU DISCHARGE PLAN (ADULT/PEDIATRIC) - ACTIVITY LEVEL
For 2 weeks, or until cleared by Dr. Carlisle/No excercise/No heavy lifting/No sports/gym/No weight bearing/No tub baths

## 2022-02-11 NOTE — PRE-ANESTHESIA EVALUATION ADULT - NSANTHAIRWAYFT_ENT_ALL_CORE
decreased mouth opening  Mouth opening: >2cm  Thyromental distance: < 3FB  Upper lip bite: adequate  Cervical ROM: grossly intact decreased mouth opening  Thyromental distance: < 3FB  Upper lip bite: adequate  Cervical ROM: grossly intact

## 2022-02-11 NOTE — BRIEF OPERATIVE NOTE - OPERATION/FINDINGS
Appendix inflamed, abscess present.  3-port Laparoscopic Appendectomy performed in the standard fashion. Appendix inflamed, phlegmonous changes noted.  3-port Laparoscopic Appendectomy performed in the standard fashion.

## 2022-02-11 NOTE — ASU DISCHARGE PLAN (ADULT/PEDIATRIC) - ASU DC SPECIAL INSTRUCTIONSFT
DIET: You may resume your regular diet.  BATHING: Please do not submerge wound underwater for one week. You may shower and/or sponge bathe after 48 hours.  ACTIVITY: No heavy lifting or straining for 2 weeks. Otherwise, you may return to your usual level of physical activity. If you are taking narcotic pain medication (such as Oxycodone) DO NOT drive or make important decisions.  NOTIFY YOUR SURGEON IF: You have any bleeding that does not stop, any pus draining from your wound(s), any fever (over 100.4) or chills, persistent nausea/vomiting, persistent diarrhea, or if your pain is not controlled on your discharge pain medications.  WOUND CARE: Please remove outer clear dressings after 48 hours. Over your incisions you have steri strips. Please leave steri strips intact. They will fall off on their own in time. Please keep incisions clean and dry. Please do not scrub or rub incisions. Do not use lotion or powder on incisions. DIET: You may resume your regular diet now.    BATHING: Please do not submerge wound underwater for one week. You may shower and/or sponge bathe after 24 hours.  ACTIVITY: No heavy lifting or straining for 2 weeks. Otherwise, you may return to your usual level of physical activity. If you are taking narcotic pain medication (such as Oxycodone) DO NOT drive or make important decisions.  NOTIFY YOUR SURGEON IF: You have any bleeding that does not stop, any pus draining from your wound(s), any fever (over 100.4) or chills, persistent nausea/vomiting, persistent diarrhea, or if your pain is not controlled on your discharge pain medications.  WOUND CARE: Please remove outer clear dressings after 48 hours. Over your incisions you have steri strips. Please leave steri strips intact. They will fall off on their own in time. Please keep incisions clean and dry. Please do not scrub or rub incisions. Do not use lotion or powder on incisions.  FOLLOWUP: Please followup with Dr. Carlisle office in one week. You may call the number listed here.  PAIN: Please  pain medications from your 24 hour pharmacy. You may take Oxycodone, Tylenol, and Ibuprofen. Please take Oxycodone 5mg at a time only, with a max of 15mg a day. You may take Tylenol 1000mg up to four times a day. You may take Ibuprofen 800mg, up to four times a day. Please do not exceed these dosages.

## 2022-02-11 NOTE — PRE-ANESTHESIA EVALUATION ADULT - NSANTHPMHFT_GEN_ALL_CORE
72 y/o female PMH Crest syndrome, scleroderma,  hypothyroid, right breast CA 2005, S/P radiation treatment and lumpectomy, lymphedema, admitted to [facility] for appendicitis, was treated with IV Zosyn and oral antibiotics.  Presents today for 72 y/o female PMH Crest syndrome, scleroderma,  hypothyroid, right breast CA 2005, S/P radiation treatment and lumpectomy, lymphedema

## 2022-02-23 PROBLEM — K37 UNSPECIFIED APPENDICITIS: Chronic | Status: ACTIVE | Noted: 2022-02-03

## 2022-02-24 LAB — SURGICAL PATHOLOGY STUDY: SIGNIFICANT CHANGE UP

## 2022-02-28 ENCOUNTER — APPOINTMENT (OUTPATIENT)
Dept: TRAUMA SURGERY | Facility: CLINIC | Age: 72
End: 2022-02-28

## 2022-02-28 VITALS
HEART RATE: 68 BPM | SYSTOLIC BLOOD PRESSURE: 143 MMHG | HEIGHT: 61 IN | TEMPERATURE: 98.7 F | BODY MASS INDEX: 29.27 KG/M2 | DIASTOLIC BLOOD PRESSURE: 82 MMHG | WEIGHT: 155 LBS

## 2022-02-28 NOTE — ASSESSMENT
[FreeTextEntry1] : looks well\par no complaints\par abdomen soft non tender and not distended\par incisions are healing well\par to follow up as needed

## 2022-03-18 NOTE — H&P PST ADULT - URINARY CATHETER
----- Message from [address] sent at 3/18/2022 12:43 PM CDT -----  Contact: Pt  Pt states she's calling rg her appt being resched and wasn't informed / states that should not have been done and can be reached at [phone]/ [phone]/thanks/dbw      no

## 2022-03-25 ENCOUNTER — TRANSCRIPTION ENCOUNTER (OUTPATIENT)
Age: 72
End: 2022-03-25

## 2022-03-30 RX ORDER — LEVOTHYROXINE SODIUM 125 UG/1
125 CAPSULE ORAL DAILY
Qty: 90 | Refills: 3 | Status: DISCONTINUED | COMMUNITY
Start: 1900-01-01 | End: 2022-03-30

## 2022-04-27 ENCOUNTER — TRANSCRIPTION ENCOUNTER (OUTPATIENT)
Age: 72
End: 2022-04-27

## 2022-04-28 ENCOUNTER — TRANSCRIPTION ENCOUNTER (OUTPATIENT)
Age: 72
End: 2022-04-28

## 2022-04-29 ENCOUNTER — TRANSCRIPTION ENCOUNTER (OUTPATIENT)
Age: 72
End: 2022-04-29

## 2022-05-23 ENCOUNTER — TRANSCRIPTION ENCOUNTER (OUTPATIENT)
Age: 72
End: 2022-05-23

## 2022-07-20 ENCOUNTER — APPOINTMENT (OUTPATIENT)
Dept: OBGYN | Facility: CLINIC | Age: 72
End: 2022-07-20

## 2022-07-20 VITALS
HEART RATE: 77 BPM | RESPIRATION RATE: 12 BRPM | HEIGHT: 61 IN | BODY MASS INDEX: 27.94 KG/M2 | DIASTOLIC BLOOD PRESSURE: 70 MMHG | OXYGEN SATURATION: 99 % | SYSTOLIC BLOOD PRESSURE: 120 MMHG | WEIGHT: 148 LBS

## 2022-07-20 DIAGNOSIS — Z13.820 ENCOUNTER FOR SCREENING FOR OSTEOPOROSIS: ICD-10-CM

## 2022-07-20 DIAGNOSIS — Z82.49 FAMILY HISTORY OF ISCHEMIC HEART DISEASE AND OTHER DISEASES OF THE CIRCULATORY SYSTEM: ICD-10-CM

## 2022-07-20 DIAGNOSIS — Z80.0 FAMILY HISTORY OF MALIGNANT NEOPLASM OF DIGESTIVE ORGANS: ICD-10-CM

## 2022-07-20 DIAGNOSIS — Z12.11 ENCOUNTER FOR SCREENING FOR MALIGNANT NEOPLASM OF COLON: ICD-10-CM

## 2022-07-20 DIAGNOSIS — Z83.3 FAMILY HISTORY OF DIABETES MELLITUS: ICD-10-CM

## 2022-07-20 DIAGNOSIS — Z80.3 FAMILY HISTORY OF MALIGNANT NEOPLASM OF BREAST: ICD-10-CM

## 2022-07-20 DIAGNOSIS — Z13.31 ENCOUNTER FOR SCREENING FOR DEPRESSION: ICD-10-CM

## 2022-07-20 DIAGNOSIS — Z01.419 ENCOUNTER FOR GYNECOLOGICAL EXAMINATION (GENERAL) (ROUTINE) W/OUT ABNORMAL FINDINGS: ICD-10-CM

## 2022-07-20 DIAGNOSIS — Z82.3 FAMILY HISTORY OF STROKE: ICD-10-CM

## 2022-07-20 PROCEDURE — 99397 PER PM REEVAL EST PAT 65+ YR: CPT | Mod: 25

## 2022-07-20 PROCEDURE — 81003 URINALYSIS AUTO W/O SCOPE: CPT | Mod: QW

## 2022-07-20 PROCEDURE — 82270 OCCULT BLOOD FECES: CPT

## 2022-07-20 NOTE — COUNSELING
[Nutrition/ Exercise/ Weight Management] : nutrition, exercise, weight management [Body Image] : body image [Sunscreen] : sunscreen [Breast Self Exam] : breast self exam [Bladder Hygiene] : bladder hygiene

## 2022-07-20 NOTE — HISTORY OF PRESENT ILLNESS
[N] : Patient is not sexually active [Y] : Positive pregnancy history [LMP unknown] : LMP unknown [Yes] : pregnancy [unknown] : Patient is unsure of the date of her LMP [Regular Cycle Intervals] : periods have been regular [Menarche Age: ____] : age at menarche was [unfilled] [Currently In Menopause] : currently in menopause [Previously active] : previously active [Men] : men [No] : No [FreeTextEntry1] : Presents for routine GYN care.  Is doing well and reports improvement in her GI symptoms since having her AP removed. [Mammogramdate] : 04/22 [TextBox_25] : unknown [PapSmeardate] : 01/21 [TextBox_37] : unknown [ColonoscopyDate] : 01/19 [PGHxTotal] : 2 [[facility] : 2 [[facility] Center[facility]Living] : 2 [TextBox_9] : 11

## 2022-07-20 NOTE — PHYSICAL EXAM
[Appropriately responsive] : appropriately responsive [Alert] : alert [No Acute Distress] : no acute distress [No Lymphadenopathy] : no lymphadenopathy [Regular Rate Rhythm] : regular rate rhythm [No Murmurs] : no murmurs [Clear to Auscultation B/L] : clear to auscultation bilaterally [Soft] : soft [Non-tender] : non-tender [Non-distended] : non-distended [No HSM] : No HSM [No Lesions] : no lesions [No Mass] : no mass [Oriented x3] : oriented x3 [Examination Of The Breasts] : a normal appearance [Lt > Rt] : the left breast was larger than the right [No Masses] : no breast masses were palpable [Vulvar Atrophy] : vulvar atrophy [Labia Majora] : normal [Labia Minora] : normal [Uterine Adnexae] : normal [Normal rectal exam] : was normal [Normal Brown Stool] : was normal and brown [Occult Blood Positive] : was negative for occult blood analysis [Internal Hemorrhoid] : no internal hemorrhoids were present [Skin Tags] : no residual hemorrhoidal skin tags [External Hemorrhoid] : no external hemorrhoids were present [Normal] : was normal [None] : there was no rectal mass

## 2022-07-21 LAB
BILIRUB UR QL STRIP: NORMAL
GLUCOSE UR-MCNC: NORMAL
HCG UR QL: 0.2 EU/DL
HGB UR QL STRIP.AUTO: NORMAL
KETONES UR-MCNC: NORMAL
LEUKOCYTE ESTERASE UR QL STRIP: NORMAL
NITRITE UR QL STRIP: NORMAL
PH UR STRIP: 6
PROT UR STRIP-MCNC: NORMAL
SP GR UR STRIP: 1.01

## 2022-07-23 LAB — BACTERIA UR CULT: NORMAL

## 2022-07-27 ENCOUNTER — NON-APPOINTMENT (OUTPATIENT)
Age: 72
End: 2022-07-27

## 2022-07-28 LAB
DATE COLLECTED: NORMAL
HEMOCCULT SP1 STL QL: NEGATIVE

## 2022-08-17 NOTE — PATIENT PROFILE ADULT - NSPRONUTRITIONRISK_GEN_A_NUR
Patient's father dropped off sports form to be filled out by Dr. Garcia. Please call father at [phone] when ready for .   No indicators present

## 2022-08-26 ENCOUNTER — NON-APPOINTMENT (OUTPATIENT)
Age: 72
End: 2022-08-26

## 2022-08-30 ENCOUNTER — TRANSCRIPTION ENCOUNTER (OUTPATIENT)
Age: 72
End: 2022-08-30

## 2022-09-07 ENCOUNTER — RX RENEWAL (OUTPATIENT)
Age: 72
End: 2022-09-07

## 2022-09-15 ENCOUNTER — APPOINTMENT (OUTPATIENT)
Dept: RADIOLOGY | Facility: CLINIC | Age: 72
End: 2022-09-15

## 2022-09-15 ENCOUNTER — OUTPATIENT (OUTPATIENT)
Dept: OUTPATIENT SERVICES | Facility: HOSPITAL | Age: 72
LOS: 1 days | End: 2022-09-15
Payer: COMMERCIAL

## 2022-09-15 DIAGNOSIS — Z98.890 OTHER SPECIFIED POSTPROCEDURAL STATES: Chronic | ICD-10-CM

## 2022-09-15 DIAGNOSIS — Z00.8 ENCOUNTER FOR OTHER GENERAL EXAMINATION: ICD-10-CM

## 2022-09-15 DIAGNOSIS — Z96.659 PRESENCE OF UNSPECIFIED ARTIFICIAL KNEE JOINT: Chronic | ICD-10-CM

## 2022-09-15 DIAGNOSIS — Z92.3 PERSONAL HISTORY OF IRRADIATION: Chronic | ICD-10-CM

## 2022-09-15 PROCEDURE — 77085 DXA BONE DENSITY AXL VRT FX: CPT

## 2022-09-15 PROCEDURE — 77085 DXA BONE DENSITY AXL VRT FX: CPT | Mod: 26

## 2022-09-16 ENCOUNTER — NON-APPOINTMENT (OUTPATIENT)
Age: 72
End: 2022-09-16

## 2022-09-18 ENCOUNTER — TRANSCRIPTION ENCOUNTER (OUTPATIENT)
Age: 72
End: 2022-09-18

## 2022-09-26 ENCOUNTER — APPOINTMENT (OUTPATIENT)
Dept: INTERNAL MEDICINE | Facility: CLINIC | Age: 72
End: 2022-09-26

## 2022-09-26 VITALS
DIASTOLIC BLOOD PRESSURE: 76 MMHG | OXYGEN SATURATION: 96 % | WEIGHT: 152.25 LBS | RESPIRATION RATE: 16 BRPM | BODY MASS INDEX: 28.75 KG/M2 | HEART RATE: 77 BPM | TEMPERATURE: 97.2 F | SYSTOLIC BLOOD PRESSURE: 122 MMHG | HEIGHT: 61 IN

## 2022-09-26 DIAGNOSIS — Z23 ENCOUNTER FOR IMMUNIZATION: ICD-10-CM

## 2022-09-26 PROCEDURE — 99214 OFFICE O/P EST MOD 30 MIN: CPT | Mod: 25

## 2022-09-26 PROCEDURE — G0008: CPT

## 2022-09-26 PROCEDURE — 90662 IIV NO PRSV INCREASED AG IM: CPT

## 2022-09-26 RX ORDER — MIRABEGRON 25 MG/1
25 TABLET, FILM COATED, EXTENDED RELEASE ORAL
Refills: 0 | Status: ACTIVE | COMMUNITY

## 2022-09-26 RX ORDER — DICLOFENAC SODIUM 75 MG/1
75 TABLET, DELAYED RELEASE ORAL
Refills: 0 | Status: DISCONTINUED | COMMUNITY
End: 2022-09-26

## 2022-09-26 RX ORDER — PHENAZOPYRIDINE HYDROCHLORIDE 200 MG/1
200 TABLET ORAL
Qty: 6 | Refills: 0 | Status: DISCONTINUED | COMMUNITY
Start: 2022-07-28

## 2022-09-26 RX ORDER — NITROFURANTOIN (MONOHYDRATE/MACROCRYSTALS) 25; 75 MG/1; MG/1
100 CAPSULE ORAL
Qty: 14 | Refills: 0 | Status: DISCONTINUED | COMMUNITY
Start: 2022-06-19

## 2022-09-26 RX ORDER — DICLOFENAC SODIUM 100 MG/1
100 TABLET, FILM COATED, EXTENDED RELEASE ORAL
Refills: 0 | Status: ACTIVE | COMMUNITY

## 2022-09-26 RX ORDER — SULFAMETHOXAZOLE AND TRIMETHOPRIM 800; 160 MG/1; MG/1
800-160 TABLET ORAL
Qty: 14 | Refills: 0 | Status: DISCONTINUED | COMMUNITY
Start: 2022-09-21

## 2022-09-26 RX ORDER — DICLOFENAC SODIUM 100 MG/1
100 TABLET, FILM COATED, EXTENDED RELEASE ORAL
Qty: 90 | Refills: 0 | Status: DISCONTINUED | COMMUNITY
Start: 2022-06-16

## 2022-09-26 RX ORDER — CEFDINIR 300 MG/1
300 CAPSULE ORAL
Qty: 14 | Refills: 0 | Status: DISCONTINUED | COMMUNITY
Start: 2022-06-22

## 2022-09-26 NOTE — PLAN
[FreeTextEntry1] : Endocrinology/Musculoskeletal\par osteopenia/osteoporosis - recommended starting Calcium 1000mg daily; continue Vitamin D-3 85263 IU p.o.q.d. with a meal as directed; continue weight-bearing exercise; recommended repeat DEXA Scan in 2 years; if next imaging reveals worsening osteoporosis, recommended initiating pharmacotherapy (bisphosphonate vs. Prolia)\par ENT/Immunology\par allergies - continue Claritin p.o.q.d. as directed; continue Nasonex q.d. as directed\par Cardiology\par hyperlipidemia - continue Rosuvastatin Calcium 5mg p.o.q.d. as directed - continue low cholesterol/low fat diet \par Endocrinology\par hypothyroidism - continue Levothyroxine Sodium 125mcg p.o.q.d. as directed\par Continue Vitamin D-3 10,000 IU p.o.q.d. with a meal as directed \par Gastroenterology\par Results of last screening colonoscopy to be requested from gastroenterologist, Dr. Wood's office\par GERD - continue Omeprazole 40mg p.o.q.d. as directed - continue antireflux measures\par Gynecology/Oncology\par right breast cancer - s/p lumpectomy and lymph node dissection; s/p 5 years on Tamoxifen - follow up with oncology/breast surgeon as directed \par Rheumatology\par arthralgias/CREST - continue Tramadol HCl 50mg TID p.o. p.r.n. as directed; continue Diclofenac Sodium 75mg BID p.o.q.d. as directed - continue to follow up with rheumatologist, Dr. Goldberg\par Urology\par Continue Phenazopyridine HCl 100mg p.o. as directed - continue to follow up with urology \par Immunization\par flu vaccine - Fluzone Quadrivalent High-Dose (65+) 0.7mL x 1 administered intramuscularly to left deltoid \par Shingrix - discussed, patient to determine if vaccine is covered under medical benefits of current insurance plan and follow up for 1st dose if interested; otherwise, instructed to follow up at the pharmacy for vaccine administration\par S/p COVID-19 Vaccine - recommended following up at pharmacy for updated COVID-19 vaccine booster 3 months after date of monoclonal antibody infusion \par Patient's immunization records to be requested from [facility]

## 2022-09-26 NOTE — PHYSICAL EXAM
[No Acute Distress] : no acute distress [Well Nourished] : well nourished [Well Developed] : well developed [Well-Appearing] : well-appearing [Normal Sclera/Conjunctiva] : normal sclera/conjunctiva [PERRL] : pupils equal round and reactive to light [EOMI] : extraocular movements intact [Normal Outer Ear/Nose] : the outer ears and nose were normal in appearance [Normal Oropharynx] : the oropharynx was normal [No JVD] : no jugular venous distention [No Lymphadenopathy] : no lymphadenopathy [Supple] : supple [Thyroid Normal, No Nodules] : the thyroid was normal and there were no nodules present [No Respiratory Distress] : no respiratory distress  [No Accessory Muscle Use] : no accessory muscle use [Clear to Auscultation] : lungs were clear to auscultation bilaterally [Normal Rate] : normal rate  [Regular Rhythm] : with a regular rhythm [Normal S1, S2] : normal S1 and S2 [No Murmur] : no murmur heard [No Carotid Bruits] : no carotid bruits [No Abdominal Bruit] : a ~M bruit was not heard ~T in the abdomen [No Varicosities] : no varicosities [Pedal Pulses Present] : the pedal pulses are present [No Edema] : there was no peripheral edema [No Palpable Aorta] : no palpable aorta [No Extremity Clubbing/Cyanosis] : no extremity clubbing/cyanosis [Soft] : abdomen soft [Non Tender] : non-tender [Non-distended] : non-distended [No Masses] : no abdominal mass palpated [No HSM] : no HSM [Normal Bowel Sounds] : normal bowel sounds [Normal Posterior Cervical Nodes] : no posterior cervical lymphadenopathy [Normal Anterior Cervical Nodes] : no anterior cervical lymphadenopathy [No CVA Tenderness] : no CVA  tenderness [No Spinal Tenderness] : no spinal tenderness [No Joint Swelling] : no joint swelling [Grossly Normal Strength/Tone] : grossly normal strength/tone [No Rash] : no rash [Coordination Grossly Intact] : coordination grossly intact [No Focal Deficits] : no focal deficits [Normal Gait] : normal gait [Deep Tendon Reflexes (DTR)] : deep tendon reflexes were 2+ and symmetric [Normal Affect] : the affect was normal [Normal Insight/Judgement] : insight and judgment were intact [Normal Voice/Communication] : normal voice/communication [Normal TMs] : both tympanic membranes were normal [Normal Nasal Mucosa] : the nasal mucosa was normal [Normal Supraclavicular Nodes] : no supraclavicular lymphadenopathy [Kyphosis] : kyphosis [Scoliosis] : no scoliosis [Acne] : no acne [Speech Grossly Normal] : speech grossly normal [Memory Grossly Normal] : memory grossly normal [Alert and Oriented x3] : oriented to person, place, and time [Normal Mood] : the mood was normal [de-identified] : overweight

## 2022-09-26 NOTE — ADDENDUM
[FreeTextEntry1] : I, Terrence Dickerson, acted solely as scribe for Dr. Unruly Tomlinson DO on this date 09/26/2022 10:40AM .\par \par All medical record entries made by the Scribe were at my, Dr. Unruly Tomlinson DO direction and personally dictated by me on 09/26/2022 10:40AM. I have reviewed the chart and agree that the record accurately reflects my personal performance of the history, physical exam, assessment and plan. I have also personally directed, reviewed and agreed with the chart.

## 2022-09-26 NOTE — HEALTH RISK ASSESSMENT
[No falls in past year] : Patient reported no falls in the past year [Fully functional (bathing, dressing, toileting, transferring, walking, feeding)] : Fully functional (bathing, dressing, toileting, transferring, walking, feeding) [Fully functional (using the telephone, shopping, preparing meals, housekeeping, doing laundry, using] : Fully functional and needs no help or supervision to perform IADLs (using the telephone, shopping, preparing meals, housekeeping, doing laundry, using transportation, managing medications and managing finances) [de-identified] : Walking, yoga. [de-identified] : Regular. [MammogramDate] : 04/22 [MammogramComments] : No interval change diagnostic for malignancy; no suspicious sonographic findings; BI-RADS 3: Probably benign findings. [BoneDensityDate] : 09/22 [BoneDensityComments] : Osteoporosis.  [ColonoscopyComments] : Results to be requested from Dr. Wood's office.

## 2022-09-26 NOTE — HISTORY OF PRESENT ILLNESS
[FreeTextEntry1] : discuss results of recent DEXA Scan, general follow up, influenza vaccine [de-identified] : Patient is a 71 year old female with a past medical history as below who presents to discuss results of recent DEXA Scan. DEXA Scan dated 9/15/22 revealed the following; spine: -1.8, osteopenia; femoral neck: -2.0, osteopenia; total hip: -1.4 , osteopenia; radius 33%: -3.1, osteoporosis. Patient has been taking Vitamin D-3 82585 IU daily. She is not currently taking an OTC Calcium supplement. She does not regularly consume dairy products secondary to constipation. She has been trying to maintain an active lifestyle: 10,000 steps per day; activity is limited by arthralgias. She notes smoking history (14 years, 1ppd). \par  \par Patient continues to follow up with rheumatologist, Dr. Goldberg given history of CREST.\par Patient notes having blood work done in June 2022.\par \par Patient inquires about receiving the flu vaccine today. She inquires about the Shingles (Shingrix) Vaccine. Patient is vaccinated against COVID-19. She notes history of COVID-19 infection (August 2022), s/p monoclonal antibody therapy.

## 2022-09-26 NOTE — ASSESSMENT
[FreeTextEntry1] : Patient is a 71 year old female with a past medical history as above who presents to discuss results of recent DEXA Scan.

## 2022-11-08 NOTE — H&P ADULT - DOES THIS PATIENT HAVE A HISTORY OF OR HAS BEEN DX WITH HEART FAILURE?
Impression: S/P Cataract Extraction by phacoemulsification with IOL placement; DEXYCU OS - 5 Days. Presence of intraocular lens  Z96.1. Excellent post op course   Post operative instructions reviewed - Condition is improving - Plan: --Advised patient to use artificial tears for comfort. Refer back to OD for refraction in 3 weeks.
no

## 2022-12-29 ENCOUNTER — RX RENEWAL (OUTPATIENT)
Age: 72
End: 2022-12-29

## 2023-01-18 ENCOUNTER — LABORATORY RESULT (OUTPATIENT)
Age: 73
End: 2023-01-18

## 2023-01-18 ENCOUNTER — NON-APPOINTMENT (OUTPATIENT)
Age: 73
End: 2023-01-18

## 2023-01-18 ENCOUNTER — APPOINTMENT (OUTPATIENT)
Dept: INTERNAL MEDICINE | Facility: CLINIC | Age: 73
End: 2023-01-18
Payer: COMMERCIAL

## 2023-01-18 VITALS
DIASTOLIC BLOOD PRESSURE: 70 MMHG | BODY MASS INDEX: 29.07 KG/M2 | RESPIRATION RATE: 17 BRPM | HEART RATE: 68 BPM | OXYGEN SATURATION: 98 % | SYSTOLIC BLOOD PRESSURE: 122 MMHG | HEIGHT: 61 IN | WEIGHT: 154 LBS | TEMPERATURE: 98 F

## 2023-01-18 DIAGNOSIS — J02.9 ACUTE PHARYNGITIS, UNSPECIFIED: ICD-10-CM

## 2023-01-18 DIAGNOSIS — Z13.1 ENCOUNTER FOR SCREENING FOR DIABETES MELLITUS: ICD-10-CM

## 2023-01-18 PROCEDURE — 36415 COLL VENOUS BLD VENIPUNCTURE: CPT

## 2023-01-18 PROCEDURE — 99213 OFFICE O/P EST LOW 20 MIN: CPT | Mod: 25

## 2023-01-18 PROCEDURE — 99397 PER PM REEVAL EST PAT 65+ YR: CPT | Mod: 25

## 2023-01-18 PROCEDURE — 93000 ELECTROCARDIOGRAM COMPLETE: CPT | Mod: 59

## 2023-01-18 NOTE — HISTORY OF PRESENT ILLNESS
[FreeTextEntry1] : annual wellness visit [de-identified] : Patient is a 72 year old female with a past medical history as below who presents for an annual wellness visit.\par \par Patient states she is taking all medications as prescribed and denies any adverse reactions or side effects. She denies any new arthralgias or myalgias on Rosuvastatin Calcium. She denies heat/cold intolerance on Levothyroxine Sodium. GERD has been well-managed on Omeprazole.\par \par Patient had breast imaging in 2022. Patient's last DEXA Scan was in September 2022.\par Patient sees oncology/breast surgeon given history of right breast cancer, s/p lumpectomy and lymph node dissection; also s/p 5 years on Tamoxifen.\par \par Patient's last screening colonoscopy was in 2018 with gastroenterologist, Dr. Wood. \par \par Patient had been seeing cardiologist, Dr. Castillo in the past. She notes past cardiac catheterization secondary to positive Nuclear Stress Test. \par \[facility] Patient sees rheumatologist, Dr. Goldberg given history of CREST; arthralgias managed on Tramadol and Diclofenac Sodium. Recent blood work with Dr. Goldberg dated 12/15/22 revealed the following: normal CBC, normal CMP, and normal CRP. \par \par Patient sees urology given history overactive bladder; currently on Myrbetriq; symptoms have improved since starting the medication.\par \par Patient received the flu vaccine for this season on 9/26/22. She states she received the Pneumonia Vaccine at her former PCP's office. She has not received the Shingles (Shingrix) Vaccine. She has received the bivalent COVID-19 vaccine booster.\par \par Patient notes post-nasal drip; started 4 weeks ago. She notes history of chronic sinusitis. She has been using Saline Nasal Spray as recommended by ENT; also taking Claritin. She states home COVID-19 tests have been negative.\par \par Patient requests Rx refill for Rosuvastatin Calcium and Omeprazole.

## 2023-01-18 NOTE — HEALTH RISK ASSESSMENT
[No falls in past year] : Patient reported no falls in the past year [Fully functional (bathing, dressing, toileting, transferring, walking, feeding)] : Fully functional (bathing, dressing, toileting, transferring, walking, feeding) [Fully functional (using the telephone, shopping, preparing meals, housekeeping, doing laundry, using] : Fully functional and needs no help or supervision to perform IADLs (using the telephone, shopping, preparing meals, housekeeping, doing laundry, using transportation, managing medications and managing finances) [Former] : Former [Yes] : Yes [No] : In the past 12 months have you used drugs other than those required for medical reasons? No [de-identified] : Walking, yoga. [de-identified] : Regular. [MammogramDate] : 04/22 [MammogramComments] : No interval change diagnostic for malignancy; no suspicious sonographic findings; BI-RADS 3: Probably benign findings; Breast US (10/24/22): BI-RADS 3: Probably benign findings.\par  [BoneDensityDate] : 09/22 [BoneDensityComments] : Osteoporosis.  [ColonoscopyComments] : Results to be requested from Dr. Wood's office.

## 2023-01-18 NOTE — PLAN
[FreeTextEntry1] : ENT/Immunology/Infectious Disease\par post-nasal drip/pharyngitis - check Throat Culture; check COVID-19 PCR (Nasopharyngeal Swab) to r/o COVID-19 infection - continue Claritin p.o.q.d. as directed; continue Saline Nasal Spray as directed - follow up with ENT specialist as necessary\par Pulmonary\par former smoker - check EKG (results as above) - continue smoking cessation \par Cardiology\par hyperlipidemia - continue Rosuvastatin Calcium 5mg p.o.q.d. as directed, Rx filled - continue low cholesterol/low fat diet - check FLP \par Endocrinology\par hypothyroidism - continue Levothyroxine Sodium 125mcg p.o.q.d. as directed - check thyroid panel\par Continue Vitamin D-3 10,000 IU p.o.q.d. with a meal as directed - check Vitamin D \par Gastroenterology\par Results of most recent screening colonoscopy to be requested from gastroenterologist, Dr. Wood's office\par GERD - continue Omeprazole 40mg p.o.q.d. as directed, Rx filled - continue antireflux measures\par Gynecology/Oncology\par right breast cancer - s/p lumpectomy and lymph node dissection; s/p 5 years on Tamoxifen - follow up with oncology/breast surgeon as directed \par Rheumatology\par arthralgias/CREST - continue Tramadol HCl 50mg TID p.o. p.r.n. as directed; continue Diclofenac Sodium 75mg BID p.o.q.d. as directed - continue to follow up with rheumatologist, Dr. Goldberg\par Endocrinology/Musculoskeletal\par osteopenia/osteoporosis - previously recommended starting Calcium 1000mg daily; continue Vitamin D-3 18284 IU p.o.q.d. with a meal as directed; continue weight-bearing exercise; recommended repeat DEXA Scan in 2 years; if next imaging reveals worsening osteoporosis, recommended initiating pharmacotherapy (bisphosphonate vs. Prolia)\par Urology\par overactive bladder - continue Myrbetriq 25mg p.o.q.d. as directed - continue to follow up with urology \par Immunization\par Shingrix - again discussed, patient to determine if vaccine is covered under medical benefits of current insurance plan and follow up for 1st dose if interested; otherwise, instructed to follow up at the pharmacy for vaccine administration\par Will review patient's records to determine if she is due to receive the Pneumonia Vaccine (Prevnar 20)\par \par check EKG (results as above), FLP, hemoglobin A1C, thyroid panel, Vitamin D, UA w/ Reflex Urine Culture, Throat Culture, and COVID-19 PCR (Nasopharyngeal Swab)

## 2023-01-18 NOTE — PHYSICAL EXAM
[No Acute Distress] : no acute distress [Well Nourished] : well nourished [Well Developed] : well developed [Well-Appearing] : well-appearing [Normal Sclera/Conjunctiva] : normal sclera/conjunctiva [PERRL] : pupils equal round and reactive to light [EOMI] : extraocular movements intact [Normal Outer Ear/Nose] : the outer ears and nose were normal in appearance [No JVD] : no jugular venous distention [No Lymphadenopathy] : no lymphadenopathy [Supple] : supple [Thyroid Normal, No Nodules] : the thyroid was normal and there were no nodules present [No Respiratory Distress] : no respiratory distress  [No Accessory Muscle Use] : no accessory muscle use [Clear to Auscultation] : lungs were clear to auscultation bilaterally [Normal Rate] : normal rate  [Regular Rhythm] : with a regular rhythm [Normal S1, S2] : normal S1 and S2 [No Murmur] : no murmur heard [No Carotid Bruits] : no carotid bruits [No Abdominal Bruit] : a ~M bruit was not heard ~T in the abdomen [No Varicosities] : no varicosities [Pedal Pulses Present] : the pedal pulses are present [No Edema] : there was no peripheral edema [No Palpable Aorta] : no palpable aorta [No Extremity Clubbing/Cyanosis] : no extremity clubbing/cyanosis [Soft] : abdomen soft [Non Tender] : non-tender [Non-distended] : non-distended [No Masses] : no abdominal mass palpated [No HSM] : no HSM [Normal Bowel Sounds] : normal bowel sounds [Normal Posterior Cervical Nodes] : no posterior cervical lymphadenopathy [Normal Anterior Cervical Nodes] : no anterior cervical lymphadenopathy [No CVA Tenderness] : no CVA  tenderness [No Spinal Tenderness] : no spinal tenderness [No Joint Swelling] : no joint swelling [Grossly Normal Strength/Tone] : grossly normal strength/tone [No Rash] : no rash [Coordination Grossly Intact] : coordination grossly intact [No Focal Deficits] : no focal deficits [Normal Gait] : normal gait [Deep Tendon Reflexes (DTR)] : deep tendon reflexes were 2+ and symmetric [Normal Affect] : the affect was normal [Normal Insight/Judgement] : insight and judgment were intact [Normal Voice/Communication] : normal voice/communication [Normal TMs] : both tympanic membranes were normal [Normal Nasal Mucosa] : the nasal mucosa was normal [Normal Supraclavicular Nodes] : no supraclavicular lymphadenopathy [Kyphosis] : kyphosis [Scoliosis] : no scoliosis [Acne] : no acne [Speech Grossly Normal] : speech grossly normal [Memory Grossly Normal] : memory grossly normal [Alert and Oriented x3] : oriented to person, place, and time [Normal Mood] : the mood was normal [de-identified] : post nasal drip (clear) [de-identified] : done by gynecology [de-identified] : overweight  [FreeTextEntry1] : done by gastroenterologist

## 2023-01-18 NOTE — ADDENDUM
[FreeTextEntry1] : I, Terrence Dickerson, acted solely as scribe for Dr. Unruly Tomlinson, DO this date 01/18/2023  7:20AM .\par \par All medical record entries made by the Scribe were at my, Dr. Unruly Tomlinson, DO direction and personally dictated by me on 01/18/2023  7:20AM. I have reviewed the chart and agree that the record accurately reflects my personal performance of the history, physical exam, assessment and plan. I have also personally directed, reviewed and agreed with the chart.

## 2023-01-22 ENCOUNTER — NON-APPOINTMENT (OUTPATIENT)
Age: 73
End: 2023-01-22

## 2023-01-22 LAB
25(OH)D3 SERPL-MCNC: 67.3 NG/ML
APPEARANCE: CLEAR
BACTERIA THROAT CULT: NORMAL
BILIRUBIN URINE: NEGATIVE
BLOOD URINE: NEGATIVE
CHOLEST SERPL-MCNC: 163 MG/DL
COLOR: NORMAL
ESTIMATED AVERAGE GLUCOSE: 117 MG/DL
GLUCOSE QUALITATIVE U: NEGATIVE
HBA1C MFR BLD HPLC: 5.7 %
HDLC SERPL-MCNC: 57 MG/DL
KETONES URINE: NEGATIVE
LDLC SERPL CALC-MCNC: 83 MG/DL
LEUKOCYTE ESTERASE URINE: NEGATIVE
NITRITE URINE: NEGATIVE
NONHDLC SERPL-MCNC: 106 MG/DL
PH URINE: 6.5
PROTEIN URINE: NEGATIVE
SARS-COV-2 N GENE NPH QL NAA+PROBE: NOT DETECTED
SPECIFIC GRAVITY URINE: 1.01
T3 SERPL-MCNC: 80 NG/DL
T3FREE SERPL-MCNC: 2.35 PG/ML
T3RU NFR SERPL: 0.9 TBI
T4 FREE SERPL-MCNC: 2.1 NG/DL
T4 SERPL-MCNC: 11.1 UG/DL
THYROGLOB AB SERPL-ACNC: <20 IU/ML
THYROPEROXIDASE AB SERPL IA-ACNC: <10 IU/ML
TRIGL SERPL-MCNC: 115 MG/DL
TSH SERPL-ACNC: 0.03 UIU/ML
UROBILINOGEN URINE: NORMAL

## 2023-03-27 ENCOUNTER — NON-APPOINTMENT (OUTPATIENT)
Age: 73
End: 2023-03-27

## 2023-04-11 ENCOUNTER — NON-APPOINTMENT (OUTPATIENT)
Age: 73
End: 2023-04-11

## 2023-04-12 ENCOUNTER — APPOINTMENT (OUTPATIENT)
Dept: INTERNAL MEDICINE | Facility: CLINIC | Age: 73
End: 2023-04-12
Payer: COMMERCIAL

## 2023-04-12 VITALS
WEIGHT: 151 LBS | HEIGHT: 61 IN | TEMPERATURE: 97.8 F | SYSTOLIC BLOOD PRESSURE: 118 MMHG | DIASTOLIC BLOOD PRESSURE: 75 MMHG | OXYGEN SATURATION: 98 % | BODY MASS INDEX: 28.51 KG/M2 | HEART RATE: 80 BPM

## 2023-04-12 DIAGNOSIS — M17.11 UNILATERAL PRIMARY OSTEOARTHRITIS, RIGHT KNEE: ICD-10-CM

## 2023-04-12 DIAGNOSIS — R60.0 LOCALIZED EDEMA: ICD-10-CM

## 2023-04-12 DIAGNOSIS — Z87.19 PERSONAL HISTORY OF OTHER DISEASES OF THE DIGESTIVE SYSTEM: ICD-10-CM

## 2023-04-12 DIAGNOSIS — Z87.898 PERSONAL HISTORY OF OTHER SPECIFIED CONDITIONS: ICD-10-CM

## 2023-04-12 DIAGNOSIS — Z82.3 FAMILY HISTORY OF STROKE: ICD-10-CM

## 2023-04-12 DIAGNOSIS — Z87.891 PERSONAL HISTORY OF NICOTINE DEPENDENCE: ICD-10-CM

## 2023-04-12 PROCEDURE — 99214 OFFICE O/P EST MOD 30 MIN: CPT | Mod: 25

## 2023-04-12 PROCEDURE — 36415 COLL VENOUS BLD VENIPUNCTURE: CPT

## 2023-04-12 RX ORDER — TRAMADOL HYDROCHLORIDE 50 MG/1
50 TABLET, COATED ORAL
Qty: 90 | Refills: 0 | Status: DISCONTINUED | COMMUNITY
Start: 2017-11-03 | End: 2023-04-12

## 2023-04-12 NOTE — PHYSICAL EXAM
[No Acute Distress] : no acute distress [Normal Sclera/Conjunctiva] : normal sclera/conjunctiva [No Respiratory Distress] : no respiratory distress  [Clear to Auscultation] : lungs were clear to auscultation bilaterally [Normal Rate] : normal rate  [Regular Rhythm] : with a regular rhythm [Normal S1, S2] : normal S1 and S2 [No Murmur] : no murmur heard

## 2023-04-12 NOTE — ASSESSMENT
[FreeTextEntry1] : 71 y/o female\par \par CREST Syndrome\par Rheumatologist: Dr. GOldberg, sees regularly\par swelling in joints, pain in joints, dry eyes, chronic sinus issues and some esophagus spasm are her main symptoms\par Has not needed steroids\par Currently managed with Diclofenac + tylenol psn\par \par Headaches: atrributes to sinusitis\par using antihistamine, nasonex and saline drops regularly\par scheduled to see her ENT\par Does have hx of migraine in the past (less since menopause from treatment), some of these headaches are global, throbbing, no visaul changes, no nausea and relieved with advil.  \par \par \par GERD:\par Omeprazole, this has helped a lot\par GI Is Dr. Wood\par \par OAB: Dr. Castillo is urologist\par Can be prone to UTIs\par Myrbetriq 25\par \par DCIS: Right lumpectomry, LN dissections, XRT, \par -completed 5 years of Tamoxifen\par -some lymphedema of right arm, complicated by CREST\par \par Hypothyroid:\par Levothyrox 125 mcg\par TSH 0.03 (suppressed) in Jan 2023\par No dose change at that time, repeat today\par \par Osteoporosis (by radius -3/1): 2022\par Takes VIt D 55531 daily\par \par HLD: on rosuva 5\par LDL at goal\par \par \par HCM:\par CLS Nina 2018: no report in chart, will request\par Mammo/Sono ordered by her GYN last week\par DEXA 2022\par Reports one pneumococcal vaccine in the past, will check with prior PCP and we will vaccinate as needed\par Shingrix - contemplating at this time\par

## 2023-04-12 NOTE — HISTORY OF PRESENT ILLNESS
[de-identified] : 73 y/o female here to establish care\екатерина \екатерина Had a recent AWV in Jan 2023 with Dr. Tomlinson\екатерина Hx of Hypothyroidism, GERD, HLD, CREST\екатерина Has a isabella team of specialists that she follows regularly with\екатерина

## 2023-04-13 ENCOUNTER — TRANSCRIPTION ENCOUNTER (OUTPATIENT)
Age: 73
End: 2023-04-13

## 2023-04-13 LAB
ALBUMIN SERPL ELPH-MCNC: 4.2 G/DL
ALP BLD-CCNC: 67 U/L
ALT SERPL-CCNC: 11 U/L
ANION GAP SERPL CALC-SCNC: 13 MMOL/L
AST SERPL-CCNC: 13 U/L
BASOPHILS # BLD AUTO: 0.07 K/UL
BASOPHILS NFR BLD AUTO: 0.8 %
BILIRUB SERPL-MCNC: 0.3 MG/DL
BUN SERPL-MCNC: 18 MG/DL
CALCIUM SERPL-MCNC: 10.2 MG/DL
CHLORIDE SERPL-SCNC: 104 MMOL/L
CO2 SERPL-SCNC: 22 MMOL/L
CREAT SERPL-MCNC: 1.03 MG/DL
EGFR: 58 ML/MIN/1.73M2
EOSINOPHIL # BLD AUTO: 0.23 K/UL
EOSINOPHIL NFR BLD AUTO: 2.7 %
GLUCOSE SERPL-MCNC: 100 MG/DL
HCT VFR BLD CALC: 37.9 %
HGB BLD-MCNC: 11.6 G/DL
IMM GRANULOCYTES NFR BLD AUTO: 0.7 %
LYMPHOCYTES # BLD AUTO: 1.89 K/UL
LYMPHOCYTES NFR BLD AUTO: 22.2 %
MAN DIFF?: NORMAL
MCHC RBC-ENTMCNC: 25.9 PG
MCHC RBC-ENTMCNC: 30.6 GM/DL
MCV RBC AUTO: 84.6 FL
MONOCYTES # BLD AUTO: 0.76 K/UL
MONOCYTES NFR BLD AUTO: 8.9 %
NEUTROPHILS # BLD AUTO: 5.5 K/UL
NEUTROPHILS NFR BLD AUTO: 64.7 %
PLATELET # BLD AUTO: 313 K/UL
POTASSIUM SERPL-SCNC: 5.1 MMOL/L
PROT SERPL-MCNC: 6.5 G/DL
RBC # BLD: 4.48 M/UL
RBC # FLD: 15.1 %
SODIUM SERPL-SCNC: 139 MMOL/L
TSH SERPL-ACNC: 0.03 UIU/ML
WBC # FLD AUTO: 8.51 K/UL

## 2023-04-13 NOTE — H&P PST ADULT - NEGATIVE BREAST SYMPTOMS
Current Events/Vocabulary
no breast tenderness L/no breast lump L/no breast lump R/no nipple discharge L/no breast tenderness R/no nipple discharge R

## 2023-04-19 ENCOUNTER — NON-APPOINTMENT (OUTPATIENT)
Age: 73
End: 2023-04-19

## 2023-04-20 ENCOUNTER — TRANSCRIPTION ENCOUNTER (OUTPATIENT)
Age: 73
End: 2023-04-20

## 2023-04-20 ENCOUNTER — NON-APPOINTMENT (OUTPATIENT)
Age: 73
End: 2023-04-20

## 2023-05-05 DIAGNOSIS — R92.8 OTHER ABNORMAL AND INCONCLUSIVE FINDINGS ON DIAGNOSTIC IMAGING OF BREAST: ICD-10-CM

## 2023-05-24 ENCOUNTER — TRANSCRIPTION ENCOUNTER (OUTPATIENT)
Age: 73
End: 2023-05-24

## 2023-08-15 ENCOUNTER — NON-APPOINTMENT (OUTPATIENT)
Age: 73
End: 2023-08-15

## 2023-09-15 ASSESSMENT — KOOS JR
GOING UP OR DOWN STAIRS: MILD
KOOS JR RAW SCORE: 7
IMPORTED LATERALITY: RIGHT
TWISING OR PIVOTING ON KNEE: MODERATE
TWISING OR PIVOTING ON KNEE: SEVERE
IMPORTED KOOS JR SCORE: 65.99
GOING UP OR DOWN STAIRS: SEVERE
IMPORTED FORM: YES
IMPORTED KOOS JR SCORE: 68.28
RISING FROM SITTING: MILD
STANDING UPRIGHT: MILD
HOW SEVERE IS YOUR KNEE STIFFNESS AFTER FIRST WAKING IN MORNING: SEVERE
IMPORTED KOOS JR SCORE: 61.58
KOOS JR RAW SCORE: 17
KOOS JR RAW SCORE: 8
KOOS JR RAW SCORE: 10
HOW SEVERE IS YOUR KNEE STIFFNESS AFTER FIRST WAKING IN MORNING: MILD
STANDING UPRIGHT: MODERATE
IMPORTED KOOS JR SCORE: 44.91
STRAIGHTENING KNEE FULLY: MILD
BENDING TO THE FLOOR TO PICK UP OBJECT: MILD
KOOS JR RAW SCORE: 9
STRAIGHTENING KNEE FULLY: MODERATE
RISING FROM SITTING: SEVERE
IMPORTED KOOS JR SCORE: 63.78
HOW SEVERE IS YOUR KNEE STIFFNESS AFTER FIRST WAKING IN MORNING: MODERATE

## 2023-09-19 NOTE — OCCUPATIONAL THERAPY INITIAL EVALUATION ADULT - VISUAL ASSESSMENT: EYE MUSCLE BALANCE
Render In Strict Bullet Format?: No Detail Level: Zone Plan: 1. Recommended cleansing face twice daily with a gentle cleanser\\n2. Start Winlevi 1 % topical cream, apply  a pea size amount twice daily to face. Moisturize after for dryness. normal

## 2023-09-28 ENCOUNTER — TRANSCRIPTION ENCOUNTER (OUTPATIENT)
Age: 73
End: 2023-09-28

## 2023-10-10 ENCOUNTER — APPOINTMENT (OUTPATIENT)
Dept: INTERNAL MEDICINE | Facility: CLINIC | Age: 73
End: 2023-10-10

## 2023-10-18 ENCOUNTER — APPOINTMENT (OUTPATIENT)
Dept: INTERNAL MEDICINE | Facility: CLINIC | Age: 73
End: 2023-10-18

## 2023-11-10 ENCOUNTER — NON-APPOINTMENT (OUTPATIENT)
Age: 73
End: 2023-11-10

## 2023-11-13 ENCOUNTER — NON-APPOINTMENT (OUTPATIENT)
Age: 73
End: 2023-11-13

## 2023-11-13 ENCOUNTER — LABORATORY RESULT (OUTPATIENT)
Age: 73
End: 2023-11-13

## 2023-11-13 ENCOUNTER — APPOINTMENT (OUTPATIENT)
Dept: INTERNAL MEDICINE | Facility: CLINIC | Age: 73
End: 2023-11-13
Payer: COMMERCIAL

## 2023-11-13 VITALS
HEART RATE: 60 BPM | WEIGHT: 156 LBS | DIASTOLIC BLOOD PRESSURE: 82 MMHG | BODY MASS INDEX: 29.48 KG/M2 | SYSTOLIC BLOOD PRESSURE: 133 MMHG | RESPIRATION RATE: 17 BRPM

## 2023-11-13 DIAGNOSIS — D25.9 LEIOMYOMA OF UTERUS, UNSPECIFIED: ICD-10-CM

## 2023-11-13 DIAGNOSIS — R09.82 POSTNASAL DRIP: ICD-10-CM

## 2023-11-13 DIAGNOSIS — Z87.39 PERSONAL HISTORY OF OTHER DISEASES OF THE MUSCULOSKELETAL SYSTEM AND CONNECTIVE TISSUE: ICD-10-CM

## 2023-11-13 DIAGNOSIS — Z12.39 ENCOUNTER FOR OTHER SCREENING FOR MALIGNANT NEOPLASM OF BREAST: ICD-10-CM

## 2023-11-13 DIAGNOSIS — K35.32 ACUTE APPENDICITIS W/ PERFORATION AND LOCALIZED PERITONITIS, W/O ABSCESS: ICD-10-CM

## 2023-11-13 DIAGNOSIS — R09.81 NASAL CONGESTION: ICD-10-CM

## 2023-11-13 PROCEDURE — G0009: CPT

## 2023-11-13 PROCEDURE — 93000 ELECTROCARDIOGRAM COMPLETE: CPT

## 2023-11-13 PROCEDURE — 99215 OFFICE O/P EST HI 40 MIN: CPT | Mod: 25

## 2023-11-13 PROCEDURE — 90677 PCV20 VACCINE IM: CPT

## 2023-11-13 RX ORDER — LEVOTHYROXINE SODIUM 0.11 MG/1
112 TABLET ORAL
Qty: 90 | Refills: 3 | Status: DISCONTINUED | COMMUNITY
Start: 2022-03-30 | End: 2023-11-13

## 2023-11-13 RX ORDER — UBIDECARENONE/VIT E ACET 100MG-5
CAPSULE ORAL
Refills: 0 | Status: ACTIVE | COMMUNITY

## 2023-11-13 RX ORDER — PHENAZOPYRIDINE HYDROCHLORIDE 100 MG/1
100 TABLET ORAL 3 TIMES DAILY
Qty: 270 | Refills: 3 | Status: DISCONTINUED | COMMUNITY
End: 2023-11-13

## 2023-11-13 RX ORDER — FLUTICASONE PROPIONATE 50 MCG
50 SPRAY, SUSPENSION NASAL
Refills: 0 | Status: ACTIVE | COMMUNITY

## 2023-11-13 RX ORDER — TRAMADOL HYDROCHLORIDE 50 MG/1
50 TABLET, COATED ORAL
Refills: 0 | Status: ACTIVE | COMMUNITY

## 2023-11-14 ENCOUNTER — NON-APPOINTMENT (OUTPATIENT)
Age: 73
End: 2023-11-14

## 2023-11-14 LAB
25(OH)D3 SERPL-MCNC: 66.5 NG/ML
ALBUMIN SERPL ELPH-MCNC: 4.2 G/DL
ALP BLD-CCNC: 66 U/L
ALT SERPL-CCNC: 11 U/L
ANION GAP SERPL CALC-SCNC: 12 MMOL/L
APPEARANCE: CLEAR
AST SERPL-CCNC: 13 U/L
BILIRUB SERPL-MCNC: 0.4 MG/DL
BILIRUBIN URINE: NEGATIVE
BLOOD URINE: NEGATIVE
BUN SERPL-MCNC: 13 MG/DL
CALCIUM SERPL-MCNC: 9.9 MG/DL
CHLORIDE SERPL-SCNC: 107 MMOL/L
CHOLEST SERPL-MCNC: 181 MG/DL
CO2 SERPL-SCNC: 22 MMOL/L
COLOR: YELLOW
CREAT SERPL-MCNC: 0.93 MG/DL
EGFR: 65 ML/MIN/1.73M2
ESTIMATED AVERAGE GLUCOSE: 114 MG/DL
GLUCOSE QUALITATIVE U: NEGATIVE MG/DL
GLUCOSE SERPL-MCNC: 94 MG/DL
HBA1C MFR BLD HPLC: 5.6 %
HCT VFR BLD CALC: 35.7 %
HDLC SERPL-MCNC: 64 MG/DL
HGB BLD-MCNC: 10.8 G/DL
KETONES URINE: NEGATIVE MG/DL
LDLC SERPL CALC-MCNC: 98 MG/DL
LEUKOCYTE ESTERASE URINE: ABNORMAL
MCHC RBC-ENTMCNC: 25.7 PG
MCHC RBC-ENTMCNC: 30.3 GM/DL
MCV RBC AUTO: 85 FL
NITRITE URINE: NEGATIVE
NONHDLC SERPL-MCNC: 117 MG/DL
PH URINE: 6.5
PLATELET # BLD AUTO: 264 K/UL
POTASSIUM SERPL-SCNC: 4.6 MMOL/L
PROT SERPL-MCNC: 6.4 G/DL
PROTEIN URINE: NEGATIVE MG/DL
RBC # BLD: 4.2 M/UL
RBC # FLD: 15.4 %
SODIUM SERPL-SCNC: 141 MMOL/L
SPECIFIC GRAVITY URINE: 1.01
TRIGL SERPL-MCNC: 104 MG/DL
TSH SERPL-ACNC: 0.03 UIU/ML
UROBILINOGEN URINE: 0.2 MG/DL
VIT B12 SERPL-MCNC: 245 PG/ML
WBC # FLD AUTO: 8.38 K/UL

## 2023-11-19 ENCOUNTER — NON-APPOINTMENT (OUTPATIENT)
Age: 73
End: 2023-11-19

## 2023-11-23 ENCOUNTER — NON-APPOINTMENT (OUTPATIENT)
Age: 73
End: 2023-11-23

## 2023-11-24 ENCOUNTER — TRANSCRIPTION ENCOUNTER (OUTPATIENT)
Age: 73
End: 2023-11-24

## 2023-11-26 RX ORDER — OMEPRAZOLE 40 MG/1
40 CAPSULE, DELAYED RELEASE ORAL
Qty: 90 | Refills: 3 | Status: ACTIVE | COMMUNITY
Start: 1900-01-01 | End: 1900-01-01

## 2023-11-26 RX ORDER — ROSUVASTATIN CALCIUM 5 MG/1
5 TABLET, FILM COATED ORAL
Qty: 90 | Refills: 3 | Status: ACTIVE | COMMUNITY
Start: 1900-01-01 | End: 1900-01-01

## 2023-11-27 ENCOUNTER — TRANSCRIPTION ENCOUNTER (OUTPATIENT)
Age: 73
End: 2023-11-27

## 2023-11-30 ENCOUNTER — OUTPATIENT (OUTPATIENT)
Dept: OUTPATIENT SERVICES | Facility: HOSPITAL | Age: 73
LOS: 1 days | End: 2023-11-30
Payer: COMMERCIAL

## 2023-11-30 ENCOUNTER — APPOINTMENT (OUTPATIENT)
Dept: ORTHOPEDIC SURGERY | Facility: CLINIC | Age: 73
End: 2023-11-30
Payer: COMMERCIAL

## 2023-11-30 ENCOUNTER — APPOINTMENT (OUTPATIENT)
Dept: CT IMAGING | Facility: CLINIC | Age: 73
End: 2023-11-30
Payer: COMMERCIAL

## 2023-11-30 VITALS — HEIGHT: 61 IN | WEIGHT: 149 LBS | BODY MASS INDEX: 28.13 KG/M2

## 2023-11-30 DIAGNOSIS — Z00.8 ENCOUNTER FOR OTHER GENERAL EXAMINATION: ICD-10-CM

## 2023-11-30 DIAGNOSIS — Z98.890 OTHER SPECIFIED POSTPROCEDURAL STATES: Chronic | ICD-10-CM

## 2023-11-30 DIAGNOSIS — Z92.3 PERSONAL HISTORY OF IRRADIATION: Chronic | ICD-10-CM

## 2023-11-30 DIAGNOSIS — Z96.651 PRESENCE OF RIGHT ARTIFICIAL KNEE JOINT: ICD-10-CM

## 2023-11-30 DIAGNOSIS — Z96.659 PRESENCE OF UNSPECIFIED ARTIFICIAL KNEE JOINT: Chronic | ICD-10-CM

## 2023-11-30 PROCEDURE — 71250 CT THORAX DX C-: CPT | Mod: 26

## 2023-11-30 PROCEDURE — 73562 X-RAY EXAM OF KNEE 3: CPT | Mod: RT

## 2023-11-30 PROCEDURE — 71250 CT THORAX DX C-: CPT

## 2023-11-30 PROCEDURE — 99203 OFFICE O/P NEW LOW 30 MIN: CPT

## 2023-12-11 ENCOUNTER — TRANSCRIPTION ENCOUNTER (OUTPATIENT)
Age: 73
End: 2023-12-11

## 2023-12-11 ENCOUNTER — NON-APPOINTMENT (OUTPATIENT)
Age: 73
End: 2023-12-11

## 2023-12-12 ENCOUNTER — TRANSCRIPTION ENCOUNTER (OUTPATIENT)
Age: 73
End: 2023-12-12

## 2023-12-20 ENCOUNTER — APPOINTMENT (OUTPATIENT)
Dept: PULMONOLOGY | Facility: CLINIC | Age: 73
End: 2023-12-20
Payer: COMMERCIAL

## 2023-12-20 ENCOUNTER — NON-APPOINTMENT (OUTPATIENT)
Age: 73
End: 2023-12-20

## 2023-12-20 VITALS
BODY MASS INDEX: 29.84 KG/M2 | HEART RATE: 76 BPM | WEIGHT: 156 LBS | DIASTOLIC BLOOD PRESSURE: 79 MMHG | SYSTOLIC BLOOD PRESSURE: 140 MMHG | OXYGEN SATURATION: 99 % | HEIGHT: 60.63 IN

## 2023-12-20 PROCEDURE — 94729 DIFFUSING CAPACITY: CPT

## 2023-12-20 PROCEDURE — 94060 EVALUATION OF WHEEZING: CPT

## 2023-12-20 PROCEDURE — ZZZZZ: CPT

## 2023-12-20 PROCEDURE — 99204 OFFICE O/P NEW MOD 45 MIN: CPT | Mod: 25

## 2023-12-20 PROCEDURE — 94726 PLETHYSMOGRAPHY LUNG VOLUMES: CPT

## 2023-12-20 NOTE — ASSESSMENT
[FreeTextEntry1] : 1. Crest syndroms: Mild dyspnea, PFTs essentially normal. Lungs clear. CT scan with some minor abnormalities, ungortunately, patient had viral illness during the time of the scan, the abnormalities may be a consequence.  Plan: Continue albuterol prn wheezing. If frequency increases, early appointment. Repeat CT chest one year with pfts. Earlier appointment if mild dyspnea increases.  Agree with cardilogy evaluation, baseline echo with PA measurement.

## 2023-12-20 NOTE — HISTORY OF PRESENT ILLNESS
[TextBox_4] : Patient with hx of scleroderma, Crest syndrome, breast Ca 20 years ago. Last saw pulmonary physician about 5 years ago. Was told no problems back then. Now, patient notes some dysponea climbing steps, still able to walk 10-15K steps daily. She does note post nasal drip and coughing. Rare wheeze, not on any inhalers until a few weeks ago, when she developed URI symptoms. In the past two weeks, has used twice with some relief of sympotms.   Plan to see cardiologist soon.

## 2023-12-21 ENCOUNTER — APPOINTMENT (OUTPATIENT)
Dept: INTERNAL MEDICINE | Facility: CLINIC | Age: 73
End: 2023-12-21

## 2024-01-10 ENCOUNTER — NON-APPOINTMENT (OUTPATIENT)
Age: 74
End: 2024-01-10

## 2024-01-11 ENCOUNTER — APPOINTMENT (OUTPATIENT)
Dept: INTERNAL MEDICINE | Facility: CLINIC | Age: 74
End: 2024-01-11
Payer: COMMERCIAL

## 2024-01-11 ENCOUNTER — NON-APPOINTMENT (OUTPATIENT)
Age: 74
End: 2024-01-11

## 2024-01-11 PROCEDURE — 96401 CHEMO ANTI-NEOPL SQ/IM: CPT

## 2024-03-06 ENCOUNTER — NON-APPOINTMENT (OUTPATIENT)
Age: 74
End: 2024-03-06

## 2024-04-08 ENCOUNTER — TRANSCRIPTION ENCOUNTER (OUTPATIENT)
Age: 74
End: 2024-04-08

## 2024-04-10 ENCOUNTER — NON-APPOINTMENT (OUTPATIENT)
Age: 74
End: 2024-04-10

## 2024-04-10 ENCOUNTER — RESULT REVIEW (OUTPATIENT)
Age: 74
End: 2024-04-10

## 2024-04-13 ENCOUNTER — APPOINTMENT (OUTPATIENT)
Dept: RADIOLOGY | Facility: CLINIC | Age: 74
End: 2024-04-13
Payer: COMMERCIAL

## 2024-04-13 ENCOUNTER — OUTPATIENT (OUTPATIENT)
Dept: OUTPATIENT SERVICES | Facility: HOSPITAL | Age: 74
LOS: 1 days | End: 2024-04-13
Payer: COMMERCIAL

## 2024-04-13 DIAGNOSIS — Z96.659 PRESENCE OF UNSPECIFIED ARTIFICIAL KNEE JOINT: Chronic | ICD-10-CM

## 2024-04-13 DIAGNOSIS — Z98.890 OTHER SPECIFIED POSTPROCEDURAL STATES: Chronic | ICD-10-CM

## 2024-04-13 DIAGNOSIS — S09.93XA UNSPECIFIED INJURY OF FACE, INITIAL ENCOUNTER: ICD-10-CM

## 2024-04-13 DIAGNOSIS — Z92.3 PERSONAL HISTORY OF IRRADIATION: Chronic | ICD-10-CM

## 2024-04-13 PROCEDURE — 70150 X-RAY EXAM OF FACIAL BONES: CPT

## 2024-04-13 PROCEDURE — 70150 X-RAY EXAM OF FACIAL BONES: CPT | Mod: 26

## 2024-04-14 ENCOUNTER — NON-APPOINTMENT (OUTPATIENT)
Age: 74
End: 2024-04-14

## 2024-05-10 ENCOUNTER — NON-APPOINTMENT (OUTPATIENT)
Age: 74
End: 2024-05-10

## 2024-05-13 ENCOUNTER — NON-APPOINTMENT (OUTPATIENT)
Age: 74
End: 2024-05-13

## 2024-05-28 ENCOUNTER — TRANSCRIPTION ENCOUNTER (OUTPATIENT)
Age: 74
End: 2024-05-28

## 2024-06-06 ENCOUNTER — NON-APPOINTMENT (OUTPATIENT)
Age: 74
End: 2024-06-06

## 2024-06-07 ENCOUNTER — LABORATORY RESULT (OUTPATIENT)
Age: 74
End: 2024-06-07

## 2024-06-07 ENCOUNTER — APPOINTMENT (OUTPATIENT)
Dept: INTERNAL MEDICINE | Facility: CLINIC | Age: 74
End: 2024-06-07
Payer: COMMERCIAL

## 2024-06-07 VITALS
WEIGHT: 148 LBS | HEART RATE: 62 BPM | HEIGHT: 60 IN | OXYGEN SATURATION: 97 % | DIASTOLIC BLOOD PRESSURE: 84 MMHG | SYSTOLIC BLOOD PRESSURE: 131 MMHG | BODY MASS INDEX: 29.06 KG/M2 | TEMPERATURE: 97.8 F

## 2024-06-07 DIAGNOSIS — K21.9 GASTRO-ESOPHAGEAL REFLUX DISEASE W/OUT ESOPHAGITIS: ICD-10-CM

## 2024-06-07 DIAGNOSIS — Z87.828 PERSONAL HISTORY OF OTHER (HEALED) PHYSICAL INJURY AND TRAUMA: ICD-10-CM

## 2024-06-07 DIAGNOSIS — E78.5 HYPERLIPIDEMIA, UNSPECIFIED: ICD-10-CM

## 2024-06-07 DIAGNOSIS — M81.0 AGE-RELATED OSTEOPOROSIS W/OUT CURRENT PATHOLOGICAL FRACTURE: ICD-10-CM

## 2024-06-07 DIAGNOSIS — E03.9 HYPOTHYROIDISM, UNSPECIFIED: ICD-10-CM

## 2024-06-07 DIAGNOSIS — Z00.00 ENCOUNTER FOR GENERAL ADULT MEDICAL EXAMINATION W/OUT ABNORMAL FINDINGS: ICD-10-CM

## 2024-06-07 DIAGNOSIS — M19.049 PRIMARY OSTEOARTHRITIS, UNSPECIFIED HAND: ICD-10-CM

## 2024-06-07 LAB
25(OH)D3 SERPL-MCNC: 49.4 NG/ML
ANION GAP SERPL CALC-SCNC: 11 MMOL/L
APPEARANCE: ABNORMAL
BILIRUB UR QL STRIP: NEGATIVE
BILIRUBIN URINE: NEGATIVE
BLOOD URINE: NEGATIVE
BUN SERPL-MCNC: 20 MG/DL
CALCIUM SERPL-MCNC: 8.3 MG/DL
CHLORIDE SERPL-SCNC: 103 MMOL/L
CLARITY UR: CLEAR
CO2 SERPL-SCNC: 20 MMOL/L
COLLECTION METHOD: NORMAL
COLOR: YELLOW
CREAT SERPL-MCNC: 1.47 MG/DL
EGFR: 37 ML/MIN/1.73M2
FERRITIN SERPL-MCNC: 43 NG/ML
GLUCOSE QUALITATIVE U: NEGATIVE MG/DL
GLUCOSE SERPL-MCNC: 88 MG/DL
GLUCOSE UR-MCNC: NEGATIVE
HCG UR QL: 0.2 EU/DL
HCT VFR BLD CALC: 30.4 %
HGB BLD-MCNC: 9.4 G/DL
HGB UR QL STRIP.AUTO: NEGATIVE
IRON SATN MFR SERPL: 9 %
IRON SERPL-MCNC: 25 UG/DL
KETONES UR-MCNC: NEGATIVE
KETONES URINE: ABNORMAL MG/DL
LEUKOCYTE ESTERASE UR QL STRIP: NORMAL
LEUKOCYTE ESTERASE URINE: ABNORMAL
MCHC RBC-ENTMCNC: 24.9 PG
MCHC RBC-ENTMCNC: 30.9 GM/DL
MCV RBC AUTO: 80.4 FL
NITRITE UR QL STRIP: NEGATIVE
NITRITE URINE: NEGATIVE
PH UR STRIP: 6
PH URINE: 6
PLATELET # BLD AUTO: 233 K/UL
POTASSIUM SERPL-SCNC: 4.6 MMOL/L
PROT UR STRIP-MCNC: NORMAL
PROTEIN URINE: NORMAL MG/DL
RBC # BLD: 3.78 M/UL
RBC # FLD: 17 %
SODIUM SERPL-SCNC: 134 MMOL/L
SP GR UR STRIP: 1.02
SPECIFIC GRAVITY URINE: 1.02
TIBC SERPL-MCNC: 286 UG/DL
TSH SERPL-ACNC: 0.19 UIU/ML
UIBC SERPL-MCNC: 262 UG/DL
UROBILINOGEN URINE: 0.2 MG/DL
VIT B12 SERPL-MCNC: >2000 PG/ML
WBC # FLD AUTO: 6.64 K/UL

## 2024-06-07 PROCEDURE — 81003 URINALYSIS AUTO W/O SCOPE: CPT | Mod: QW

## 2024-06-07 PROCEDURE — G0439: CPT

## 2024-06-07 PROCEDURE — 99214 OFFICE O/P EST MOD 30 MIN: CPT | Mod: 25

## 2024-06-07 RX ORDER — VIBEGRON 75 MG/1
75 TABLET, FILM COATED ORAL
Qty: 30 | Refills: 3 | Status: DISCONTINUED | COMMUNITY
Start: 2023-11-13 | End: 2024-06-07

## 2024-06-07 RX ORDER — LEVOTHYROXINE SODIUM 0.1 MG/1
100 TABLET ORAL
Qty: 90 | Refills: 1 | Status: ACTIVE | COMMUNITY
Start: 2023-11-13 | End: 1900-01-01

## 2024-06-07 NOTE — HISTORY OF PRESENT ILLNESS
[de-identified] : fall 1m ago. since knee replacement vision is down. noticed pavement uneven. misstepped and felt. hit face. nose still hurts. R knee still bruised.  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 gets annual mammo/us 6m apart with tvus and renal us lymphedema due to breast surgery and crest. avoids injury, lifting b/ arms CREST hx raynauds age 16, age 25 digital ulcer. rheum goldberg. gets muscle spasm on RA diet avoids flour. processed foods which helps.. walks, physically active. recent pulm eval due for cardio eval due for tte osteoporosis due for prolia today  ophtho utd derm utd dental utd  mammo utd may  colo 6m ago  pap this year  vaccines utd declines shingrix.  recent uti req ua today. no dysuria fever pain.

## 2024-06-07 NOTE — ADDENDUM
[FreeTextEntry1] : Spoke with pt re bloodwork:  -fluids -hold nsaids -cont ppi given gastritis -renal us recently normal (myrbetriq only confrimed w pt)  -hold prolia -dec synthroid 100mcg -rpt bmp 1w, spep -tylenol prn tramadol for pain -await ua

## 2024-06-07 NOTE — ASSESSMENT
[FreeTextEntry1] : fall recovered. 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 gets annual mammo/us 6m apart with tvus and renal us all utd stable lymphedema due to breast surgery and crest. decliens le therapy CREST hx raynauds age 16, age 25 digital ulcer. rheum goldberg. cont sx meds.  cont walking recent pulm eval due for cardio eval due for tte and cardio fu  osteoporosis due for prolia next visit. calcium today ua today.  ophtho utd derm utd dental utd  mammo utd may  colo 6m ago  pap this year  vaccines discussed shingrix, rsv she declined

## 2024-06-07 NOTE — HEALTH RISK ASSESSMENT
[0] : 2) Feeling down, depressed, or hopeless: Not at all (0) [PHQ-2 Negative - No further assessment needed] : PHQ-2 Negative - No further assessment needed [Former] : Former [5-9] : 5-9 [> 15 Years] : > 15 Years [Fully functional (bathing, dressing, toileting, transferring, walking, feeding)] : Fully functional (bathing, dressing, toileting, transferring, walking, feeding) [Fully functional (using the telephone, shopping, preparing meals, housekeeping, doing laundry, using] : Fully functional and needs no help or supervision to perform IADLs (using the telephone, shopping, preparing meals, housekeeping, doing laundry, using transportation, managing medications and managing finances) [With Patient/Caregiver] : , with patient/caregiver [Reviewed no changes] : Reviewed, no changes [Designated Healthcare Proxy] : Designated healthcare proxy [[id number]] : 0 [AdvancecareDate] : 06/24

## 2024-06-08 LAB
ALBUMIN SERPL ELPH-MCNC: 3.9 G/DL
ALP BLD-CCNC: 50 U/L
ALT SERPL-CCNC: 9 U/L
ANION GAP SERPL CALC-SCNC: 12 MMOL/L
AST SERPL-CCNC: 14 U/L
BILIRUB SERPL-MCNC: 0.3 MG/DL
BUN SERPL-MCNC: 21 MG/DL
CALCIUM SERPL-MCNC: 8.6 MG/DL
CHLORIDE SERPL-SCNC: 102 MMOL/L
CO2 SERPL-SCNC: 17 MMOL/L
CREAT SERPL-MCNC: 1.53 MG/DL
EGFR: 36 ML/MIN/1.73M2
GLUCOSE SERPL-MCNC: 90 MG/DL
POTASSIUM SERPL-SCNC: 4.8 MMOL/L
PROT SERPL-MCNC: 6 G/DL
SODIUM SERPL-SCNC: 132 MMOL/L

## 2024-06-10 ENCOUNTER — RX RENEWAL (OUTPATIENT)
Age: 74
End: 2024-06-10

## 2024-06-10 ENCOUNTER — NON-APPOINTMENT (OUTPATIENT)
Age: 74
End: 2024-06-10

## 2024-06-10 ENCOUNTER — TRANSCRIPTION ENCOUNTER (OUTPATIENT)
Age: 74
End: 2024-06-10

## 2024-06-10 DIAGNOSIS — R39.9 UNSPECIFIED SYMPTOMS AND SIGNS INVOLVING THE GENITOURINARY SYSTEM: ICD-10-CM

## 2024-06-10 RX ORDER — NITROFURANTOIN (MONOHYDRATE/MACROCRYSTALS) 25; 75 MG/1; MG/1
100 CAPSULE ORAL TWICE DAILY
Qty: 10 | Refills: 0 | Status: DISCONTINUED | COMMUNITY
Start: 2024-06-07 | End: 2024-06-10

## 2024-06-10 RX ORDER — CEPHALEXIN 500 MG/1
500 CAPSULE ORAL
Qty: 10 | Refills: 0 | Status: ACTIVE | COMMUNITY
Start: 2024-06-10 | End: 1900-01-01

## 2024-06-12 LAB
ALBUMIN MFR SERPL ELPH: 57.5 %
ALBUMIN SERPL-MCNC: 3.4 G/DL
ALBUMIN/GLOB SERPL: 1.4 RATIO
ALPHA1 GLOB MFR SERPL ELPH: 6.8 %
ALPHA1 GLOB SERPL ELPH-MCNC: 0.4 G/DL
ALPHA2 GLOB MFR SERPL ELPH: 14.3 %
ALPHA2 GLOB SERPL ELPH-MCNC: 0.8 G/DL
B-GLOBULIN MFR SERPL ELPH: 11.5 %
B-GLOBULIN SERPL ELPH-MCNC: 0.7 G/DL
DEPRECATED KAPPA LC FREE/LAMBDA SER: 1.36 RATIO
GAMMA GLOB FLD ELPH-MCNC: 0.6 G/DL
GAMMA GLOB MFR SERPL ELPH: 9.9 %
IGA SER QL IEP: 56 MG/DL
IGG SER QL IEP: 592 MG/DL
IGM SER QL IEP: 50 MG/DL
INTERPRETATION SERPL IEP-IMP: NORMAL
KAPPA LC CSF-MCNC: 2.09 MG/DL
KAPPA LC SERPL-MCNC: 2.85 MG/DL
M PROTEIN SPEC IFE-MCNC: NORMAL
PROT SERPL-MCNC: 5.9 G/DL
PROT SERPL-MCNC: 5.9 G/DL

## 2024-06-17 LAB
ANION GAP SERPL CALC-SCNC: 9 MMOL/L
BUN SERPL-MCNC: 16 MG/DL
CALCIUM SERPL-MCNC: 9.2 MG/DL
CHLORIDE SERPL-SCNC: 112 MMOL/L
CO2 SERPL-SCNC: 22 MMOL/L
CREAT SERPL-MCNC: 1.13 MG/DL
CREAT SPEC-SCNC: 64 MG/DL
CREAT/PROT UR: 0.3 RATIO
EGFR: 51 ML/MIN/1.73M2
GLUCOSE SERPL-MCNC: 89 MG/DL
HCT VFR BLD CALC: 33.8 %
HGB BLD-MCNC: 9.9 G/DL
MCHC RBC-ENTMCNC: 24.4 PG
MCHC RBC-ENTMCNC: 29.3 GM/DL
MCV RBC AUTO: 83.5 FL
PLATELET # BLD AUTO: 287 K/UL
POTASSIUM SERPL-SCNC: 4.9 MMOL/L
PROT UR-MCNC: 16 MG/DL
RBC # BLD: 4.05 M/UL
RBC # FLD: 17.3 %
SODIUM SERPL-SCNC: 142 MMOL/L
WBC # FLD AUTO: 9.39 K/UL

## 2024-06-24 ENCOUNTER — TRANSCRIPTION ENCOUNTER (OUTPATIENT)
Age: 74
End: 2024-06-24

## 2024-07-02 ENCOUNTER — RX RENEWAL (OUTPATIENT)
Age: 74
End: 2024-07-02

## 2024-07-02 ENCOUNTER — TRANSCRIPTION ENCOUNTER (OUTPATIENT)
Age: 74
End: 2024-07-02

## 2024-07-02 DIAGNOSIS — M34.1 CR(E)ST SYNDROME: ICD-10-CM

## 2024-07-02 RX ORDER — ALBUTEROL SULFATE 90 UG/1
108 (90 BASE) INHALANT RESPIRATORY (INHALATION)
Qty: 1 | Refills: 1 | Status: ACTIVE | COMMUNITY
Start: 2024-07-02 | End: 1900-01-01

## 2024-07-25 ENCOUNTER — NON-APPOINTMENT (OUTPATIENT)
Age: 74
End: 2024-07-25

## 2024-07-26 ENCOUNTER — NON-APPOINTMENT (OUTPATIENT)
Age: 74
End: 2024-07-26

## 2024-08-16 DIAGNOSIS — M81.0 AGE-RELATED OSTEOPOROSIS W/OUT CURRENT PATHOLOGICAL FRACTURE: ICD-10-CM

## 2024-08-19 ENCOUNTER — APPOINTMENT (OUTPATIENT)
Dept: INTERNAL MEDICINE | Facility: CLINIC | Age: 74
End: 2024-08-19
Payer: COMMERCIAL

## 2024-08-19 PROCEDURE — 96401 CHEMO ANTI-NEOPL SQ/IM: CPT

## 2024-08-19 RX ORDER — DENOSUMAB 60 MG/ML
60 INJECTION SUBCUTANEOUS
Qty: 1 | Refills: 0 | Status: COMPLETED | OUTPATIENT
Start: 2024-08-16

## 2024-08-26 ENCOUNTER — RX RENEWAL (OUTPATIENT)
Age: 74
End: 2024-08-26

## 2024-08-26 NOTE — PRE-ANESTHESIA EVALUATION ADULT - WEIGHT IN LBS
VISITED DURING SPIRITUAL CARE ROUNDS.  PT APPEARED TO BE SLEEPING.  DID NOT
DISTURB.  PROVIDED PRAYER. [id number]

## 2024-09-19 ENCOUNTER — NON-APPOINTMENT (OUTPATIENT)
Age: 74
End: 2024-09-19

## 2024-10-07 ENCOUNTER — TRANSCRIPTION ENCOUNTER (OUTPATIENT)
Age: 74
End: 2024-10-07

## 2024-10-07 DIAGNOSIS — E78.5 HYPERLIPIDEMIA, UNSPECIFIED: ICD-10-CM

## 2024-10-07 DIAGNOSIS — M34.1 CR(E)ST SYNDROME: ICD-10-CM

## 2024-10-07 RX ORDER — FOLIC ACID 1 MG/1
1 TABLET ORAL
Qty: 90 | Refills: 3 | Status: ACTIVE | COMMUNITY
Start: 2024-10-07 | End: 1900-01-01

## 2024-10-09 ENCOUNTER — LABORATORY RESULT (OUTPATIENT)
Age: 74
End: 2024-10-09

## 2024-10-09 ENCOUNTER — TRANSCRIPTION ENCOUNTER (OUTPATIENT)
Age: 74
End: 2024-10-09

## 2024-10-10 ENCOUNTER — LABORATORY RESULT (OUTPATIENT)
Age: 74
End: 2024-10-10

## 2024-10-10 DIAGNOSIS — D64.9 ANEMIA, UNSPECIFIED: ICD-10-CM

## 2024-10-10 LAB
ANION GAP SERPL CALC-SCNC: 15 MMOL/L
BASOPHILS # BLD AUTO: 0.04 K/UL
BASOPHILS NFR BLD AUTO: 0.5 %
BUN SERPL-MCNC: 24 MG/DL
CALCIUM SERPL-MCNC: 9.7 MG/DL
CHLORIDE SERPL-SCNC: 105 MMOL/L
CO2 SERPL-SCNC: 21 MMOL/L
CREAT SERPL-MCNC: 1.1 MG/DL
EGFR: 53 ML/MIN/1.73M2
EOSINOPHIL # BLD AUTO: 0.14 K/UL
EOSINOPHIL NFR BLD AUTO: 1.8 %
GLUCOSE SERPL-MCNC: 96 MG/DL
HCT VFR BLD CALC: 39.5 %
HGB BLD-MCNC: 11.9 G/DL
IMM GRANULOCYTES NFR BLD AUTO: 0.6 %
LYMPHOCYTES # BLD AUTO: 1.45 K/UL
LYMPHOCYTES NFR BLD AUTO: 18.8 %
MAN DIFF?: NORMAL
MCHC RBC-ENTMCNC: 27 PG
MCHC RBC-ENTMCNC: 30.1 GM/DL
MCV RBC AUTO: 89.8 FL
MONOCYTES # BLD AUTO: 0.55 K/UL
MONOCYTES NFR BLD AUTO: 7.1 %
NEUTROPHILS # BLD AUTO: 5.47 K/UL
NEUTROPHILS NFR BLD AUTO: 71.2 %
PLATELET # BLD AUTO: 221 K/UL
POTASSIUM SERPL-SCNC: 5 MMOL/L
RBC # BLD: 4.4 M/UL
RBC # FLD: 17.9 %
SODIUM SERPL-SCNC: 140 MMOL/L
TSH SERPL-ACNC: 0.04 UIU/ML
WBC # FLD AUTO: 7.7 K/UL

## 2024-10-10 RX ORDER — LEVOTHYROXINE SODIUM 0.11 MG/1
112 TABLET ORAL
Qty: 45 | Refills: 3 | Status: ACTIVE | COMMUNITY
Start: 2024-10-10 | End: 1900-01-01

## 2024-10-11 LAB
FERRITIN SERPL-MCNC: 377 NG/ML
FOLATE SERPL-MCNC: >20 NG/ML
IRON SATN MFR SERPL: 23 %
IRON SERPL-MCNC: 56 UG/DL
TIBC SERPL-MCNC: 246 UG/DL
TSH SERPL-ACNC: 0.04 UIU/ML
UIBC SERPL-MCNC: 190 UG/DL
VIT B12 SERPL-MCNC: >2000 PG/ML

## 2024-10-14 DIAGNOSIS — E03.9 HYPOTHYROIDISM, UNSPECIFIED: ICD-10-CM

## 2024-10-14 LAB
ALBUMIN MFR SERPL ELPH: 63.3 %
ALBUMIN SERPL-MCNC: 4.4 G/DL
ALBUMIN/GLOB SERPL: 1.7 RATIO
ALPHA1 GLOB MFR SERPL ELPH: 4.6 %
ALPHA1 GLOB SERPL ELPH-MCNC: 0.3 G/DL
ALPHA2 GLOB MFR SERPL ELPH: 12.3 %
ALPHA2 GLOB SERPL ELPH-MCNC: 0.9 G/DL
B-GLOBULIN MFR SERPL ELPH: 9.3 %
B-GLOBULIN SERPL ELPH-MCNC: 0.7 G/DL
DEPRECATED KAPPA LC FREE/LAMBDA SER: 1.29 RATIO
GAMMA GLOB FLD ELPH-MCNC: 0.7 G/DL
GAMMA GLOB MFR SERPL ELPH: 10.5 %
IGA SER QL IEP: 56 MG/DL
IGG SER QL IEP: 698 MG/DL
IGM SER QL IEP: 72 MG/DL
INTERPRETATION SERPL IEP-IMP: NORMAL
KAPPA LC CSF-MCNC: 1.51 MG/DL
KAPPA LC SERPL-MCNC: 1.95 MG/DL
M PROTEIN SPEC IFE-MCNC: NORMAL
PROT SERPL-MCNC: 7 G/DL
PROT SERPL-MCNC: 7 G/DL

## 2024-10-23 DIAGNOSIS — R92.8 OTHER ABNORMAL AND INCONCLUSIVE FINDINGS ON DIAGNOSTIC IMAGING OF BREAST: ICD-10-CM

## 2024-11-06 ENCOUNTER — RX RENEWAL (OUTPATIENT)
Age: 74
End: 2024-11-06

## 2024-11-21 ENCOUNTER — NON-APPOINTMENT (OUTPATIENT)
Age: 74
End: 2024-11-21

## 2024-11-21 ENCOUNTER — LABORATORY RESULT (OUTPATIENT)
Age: 74
End: 2024-11-21

## 2024-11-29 ENCOUNTER — RX RENEWAL (OUTPATIENT)
Age: 74
End: 2024-11-29

## 2024-12-02 ENCOUNTER — TRANSCRIPTION ENCOUNTER (OUTPATIENT)
Age: 74
End: 2024-12-02

## 2024-12-02 ENCOUNTER — APPOINTMENT (OUTPATIENT)
Dept: CARDIOLOGY | Facility: CLINIC | Age: 74
End: 2024-12-02
Payer: COMMERCIAL

## 2024-12-02 ENCOUNTER — NON-APPOINTMENT (OUTPATIENT)
Age: 74
End: 2024-12-02

## 2024-12-02 VITALS — DIASTOLIC BLOOD PRESSURE: 80 MMHG | WEIGHT: 143 LBS | BODY MASS INDEX: 27.93 KG/M2 | SYSTOLIC BLOOD PRESSURE: 127 MMHG

## 2024-12-02 DIAGNOSIS — E78.5 HYPERLIPIDEMIA, UNSPECIFIED: ICD-10-CM

## 2024-12-02 PROCEDURE — 99205 OFFICE O/P NEW HI 60 MIN: CPT

## 2024-12-02 PROCEDURE — 93306 TTE W/DOPPLER COMPLETE: CPT

## 2024-12-02 PROCEDURE — 93000 ELECTROCARDIOGRAM COMPLETE: CPT

## 2024-12-02 PROCEDURE — G2211 COMPLEX E/M VISIT ADD ON: CPT | Mod: NC

## 2024-12-02 NOTE — DISCUSSION/SUMMARY
[EKG obtained to assist in diagnosis and management of assessed problem(s)] : EKG obtained to assist in diagnosis and management of assessed problem(s) [FreeTextEntry1] :  Check TTE to evaluate pulmonary pressures and assess for cardiomyopathy.

## 2024-12-02 NOTE — CARDIOLOGY SUMMARY
[de-identified] : 12/2/2024 - sinus rhythm at 66 bpm, left atrial enlargement [de-identified] : 12/2/2024 - normal pulmonary pressure, normal LV systolic function, LVEF 72%

## 2024-12-02 NOTE — CARDIOLOGY SUMMARY
[de-identified] : 12/2/2024 - sinus rhythm at 66 bpm, left atrial enlargement [de-identified] : 12/2/2024 - normal pulmonary pressure, normal LV systolic function, LVEF 72%

## 2024-12-02 NOTE — HISTORY OF PRESENT ILLNESS
[FreeTextEntry1] : Cydney Garg is Lance Garg's wife and Nita Duran's mother.  Patient is a 74 year-old white woman with known past medical history of CREST syndrome, dyslipidemia, breast ca status post lumpectomy, presents today for cardiac evaluation due to risk factors.  She has no cardiovascular complaints. She specifically denies chest pain, shortness of breath, palpitations, and lower extremity edema. In the past, she has had some chest discomfort with stress, and she had palpitations associated with her thyroid disease.   Her mother was a smoker and  at age 69 from coronary artery disease.   She walks for exercise.  Pulmonary: Vasquez Marin MD Rheumatology: Avram Goldberg, MD

## 2024-12-10 ENCOUNTER — APPOINTMENT (OUTPATIENT)
Dept: INTERNAL MEDICINE | Facility: CLINIC | Age: 74
End: 2024-12-10
Payer: COMMERCIAL

## 2024-12-10 ENCOUNTER — TRANSCRIPTION ENCOUNTER (OUTPATIENT)
Age: 74
End: 2024-12-10

## 2024-12-10 DIAGNOSIS — M34.1 CR(E)ST SYNDROME: ICD-10-CM

## 2024-12-10 DIAGNOSIS — K21.9 GASTRO-ESOPHAGEAL REFLUX DISEASE W/OUT ESOPHAGITIS: ICD-10-CM

## 2024-12-10 PROCEDURE — 99442: CPT

## 2024-12-23 ENCOUNTER — APPOINTMENT (OUTPATIENT)
Dept: PULMONOLOGY | Facility: CLINIC | Age: 74
End: 2024-12-23
Payer: COMMERCIAL

## 2024-12-23 VITALS
SYSTOLIC BLOOD PRESSURE: 112 MMHG | OXYGEN SATURATION: 97 % | WEIGHT: 141 LBS | BODY MASS INDEX: 26.97 KG/M2 | DIASTOLIC BLOOD PRESSURE: 71 MMHG | HEIGHT: 60.6 IN | HEART RATE: 68 BPM

## 2024-12-23 DIAGNOSIS — M34.1 CR(E)ST SYNDROME: ICD-10-CM

## 2024-12-23 PROCEDURE — 94010 BREATHING CAPACITY TEST: CPT

## 2024-12-23 PROCEDURE — 99214 OFFICE O/P EST MOD 30 MIN: CPT | Mod: 25

## 2024-12-23 PROCEDURE — ZZZZZ: CPT

## 2024-12-23 PROCEDURE — 94726 PLETHYSMOGRAPHY LUNG VOLUMES: CPT

## 2024-12-23 PROCEDURE — 94729 DIFFUSING CAPACITY: CPT

## 2024-12-23 NOTE — HISTORY OF PRESENT ILLNESS
[Former] : former [TextBox_4] : Cydney Garg is a 74-year-old female who presents today for a follow-up visit. Patient with hx of scleroderma, Crest syndrome, breast Ca 20 years ago. Patient states she is doing well from a pulmonary standpoint. Patient states she has chronic sinusitis and has been prescribed albuterol for associated wheezing that occurs secondary to her sinusitis. Patient states she has relief of her wheezing when she uses it. Patient states her breathing is doing well, she doesn't have issues keeping up with her peers. States she notes some shortness of breath going up steps or hiking up elevated surfaces. Patient denies any cough or wheezing with activity.   PFTs performed in office today FEV1: 1.79 (98)  FVC:2.62 (107) FEV1/FVC: 68 T.74 (104)  DLCO: 15.5 (83)  CT chest 2023: Ill-defined airway associated foci of groundglass opacity in the right upper and middle lobe and a nodular opacity in the left upper lobe, likely postinflammatory/postinfectious. Bandlike opacity in the anteromedial segment of left lower lobe, likely due to mucoid impaction.  Follow-up CT chest has been ordered, will follow-up regarding this.

## 2024-12-23 NOTE — ASSESSMENT
[FreeTextEntry1] : 1. Crest syndrome: Mild dyspnea with exertion, PFTs essentially normal. Lungs clear to auscultation  CT Chest 11/30/23: Ill-defined airway associated foci of ground glass opacity in the right upper and middle lobe and a nodular opacity in the left upper lobe, likely post inflammatory/postinfectious. Bandlike opacity in the anteromedial segment of left lower lobe, likely due to mucoid impaction.  -Ordered follow-up CT chest to be performed  Plan: Continue albuterol prn wheezing. If frequency increases, earlier appointment. Advised patient to follow-up sooner if she finds she needs the albuterol more than a few times per week.    RTC in one year    I, Jameel Barahona NP, am scribing for and in the presence of Dr. Vasquez Marin, the following sections HISTORY OF PRESENT ILLNESS, PAST MEDICAL/FAMILY/SOCIAL HISTORY; REVIEW OF SYSTEMS; VITAL SIGNS; PHYSICAL EXAM; DISPOSITION.

## 2025-01-07 ENCOUNTER — TRANSCRIPTION ENCOUNTER (OUTPATIENT)
Age: 75
End: 2025-01-07

## 2025-01-08 ENCOUNTER — TRANSCRIPTION ENCOUNTER (OUTPATIENT)
Age: 75
End: 2025-01-08

## 2025-01-10 ENCOUNTER — LABORATORY RESULT (OUTPATIENT)
Age: 75
End: 2025-01-10

## 2025-01-10 ENCOUNTER — APPOINTMENT (OUTPATIENT)
Dept: INTERNAL MEDICINE | Facility: CLINIC | Age: 75
End: 2025-01-10
Payer: COMMERCIAL

## 2025-01-10 VITALS
WEIGHT: 142 LBS | BODY MASS INDEX: 27.16 KG/M2 | SYSTOLIC BLOOD PRESSURE: 130 MMHG | HEIGHT: 60.6 IN | TEMPERATURE: 98 F | DIASTOLIC BLOOD PRESSURE: 78 MMHG | HEART RATE: 80 BPM

## 2025-01-10 DIAGNOSIS — K21.9 GASTRO-ESOPHAGEAL REFLUX DISEASE W/OUT ESOPHAGITIS: ICD-10-CM

## 2025-01-10 DIAGNOSIS — E78.5 HYPERLIPIDEMIA, UNSPECIFIED: ICD-10-CM

## 2025-01-10 PROCEDURE — 36415 COLL VENOUS BLD VENIPUNCTURE: CPT

## 2025-01-10 PROCEDURE — 99215 OFFICE O/P EST HI 40 MIN: CPT

## 2025-01-11 ENCOUNTER — RX RENEWAL (OUTPATIENT)
Age: 75
End: 2025-01-11

## 2025-01-11 ENCOUNTER — NON-APPOINTMENT (OUTPATIENT)
Age: 75
End: 2025-01-11

## 2025-01-11 NOTE — ASSESSMENT
[FreeTextEntry1] :  gerd stable taking ppi as needed hb normal p iron. utd colo/egd. sees heme and gi.  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 gets annual mammo/us 6m apart with tvus and renal us due again in spring.  lymphedema stable due to breast surgery and crest. avoids injury, lifting b/ arms.  CREST hx raynauds age 16, age 25 digital ulcer. rheum goldberg. due for ct chest. saw pulm, cardio. on RA diet avoids flour. processed foods which helps.. walks. recent pulm eval and cardio eval. utd pfts, tte, due for ct chest has scripts.  osteoporosis gets prolia due for dexa and labs. hypothyroid due for tsh today ophtho utd derm utd dental utd mammo utd nov along with renal and tvus and breast us  colo 6m ago pap this year vaccines utd flu declines shingrix, due for covid, tetanus at pharmacy.

## 2025-01-11 NOTE — PHYSICAL EXAM
[No Acute Distress] : no acute distress [Well Nourished] : well nourished [Well Developed] : well developed [Well-Appearing] : well-appearing [Normal Sclera/Conjunctiva] : normal sclera/conjunctiva [PERRL] : pupils equal round and reactive to light [EOMI] : extraocular movements intact [Normal Outer Ear/Nose] : the outer ears and nose were normal in appearance [Normal Oropharynx] : the oropharynx was normal [No JVD] : no jugular venous distention [No Lymphadenopathy] : no lymphadenopathy [Supple] : supple [Thyroid Normal, No Nodules] : the thyroid was normal and there were no nodules present [No Respiratory Distress] : no respiratory distress  [No Accessory Muscle Use] : no accessory muscle use [Clear to Auscultation] : lungs were clear to auscultation bilaterally [Normal Rate] : normal rate  [Regular Rhythm] : with a regular rhythm [Normal S1, S2] : normal S1 and S2 [No Murmur] : no murmur heard [No Carotid Bruits] : no carotid bruits [No Abdominal Bruit] : a ~M bruit was not heard ~T in the abdomen [No Varicosities] : no varicosities [Pedal Pulses Present] : the pedal pulses are present [No Edema] : there was no peripheral edema [No Palpable Aorta] : no palpable aorta [No Extremity Clubbing/Cyanosis] : no extremity clubbing/cyanosis [Soft] : abdomen soft [Non Tender] : non-tender [Non-distended] : non-distended [No Masses] : no abdominal mass palpated [No HSM] : no HSM [Normal Bowel Sounds] : normal bowel sounds [Normal Posterior Cervical Nodes] : no posterior cervical lymphadenopathy [Normal Anterior Cervical Nodes] : no anterior cervical lymphadenopathy [No CVA Tenderness] : no CVA  tenderness [No Spinal Tenderness] : no spinal tenderness [No Joint Swelling] : no joint swelling [Grossly Normal Strength/Tone] : grossly normal strength/tone [No Rash] : no rash [Coordination Grossly Intact] : coordination grossly intact [No Focal Deficits] : no focal deficits [Normal Gait] : normal gait [Deep Tendon Reflexes (DTR)] : deep tendon reflexes were 2+ and symmetric [Normal Affect] : the affect was normal [Normal Insight/Judgement] : insight and judgment were intact [de-identified] : telangiectasias

## 2025-01-11 NOTE — PHYSICAL EXAM
[No Acute Distress] : no acute distress [Well Nourished] : well nourished [Well Developed] : well developed [Well-Appearing] : well-appearing [Normal Sclera/Conjunctiva] : normal sclera/conjunctiva [PERRL] : pupils equal round and reactive to light [EOMI] : extraocular movements intact [Normal Outer Ear/Nose] : the outer ears and nose were normal in appearance [Normal Oropharynx] : the oropharynx was normal [No JVD] : no jugular venous distention [No Lymphadenopathy] : no lymphadenopathy [Supple] : supple [Thyroid Normal, No Nodules] : the thyroid was normal and there were no nodules present [No Respiratory Distress] : no respiratory distress  [No Accessory Muscle Use] : no accessory muscle use [Clear to Auscultation] : lungs were clear to auscultation bilaterally [Normal Rate] : normal rate  [Regular Rhythm] : with a regular rhythm [Normal S1, S2] : normal S1 and S2 [No Murmur] : no murmur heard [No Carotid Bruits] : no carotid bruits [No Abdominal Bruit] : a ~M bruit was not heard ~T in the abdomen [No Varicosities] : no varicosities [Pedal Pulses Present] : the pedal pulses are present [No Edema] : there was no peripheral edema [No Palpable Aorta] : no palpable aorta [No Extremity Clubbing/Cyanosis] : no extremity clubbing/cyanosis [Soft] : abdomen soft [Non Tender] : non-tender [Non-distended] : non-distended [No Masses] : no abdominal mass palpated [No HSM] : no HSM [Normal Bowel Sounds] : normal bowel sounds [Normal Posterior Cervical Nodes] : no posterior cervical lymphadenopathy [Normal Anterior Cervical Nodes] : no anterior cervical lymphadenopathy [No CVA Tenderness] : no CVA  tenderness [No Spinal Tenderness] : no spinal tenderness [No Joint Swelling] : no joint swelling [Grossly Normal Strength/Tone] : grossly normal strength/tone [No Rash] : no rash [Coordination Grossly Intact] : coordination grossly intact [No Focal Deficits] : no focal deficits [Normal Gait] : normal gait [Deep Tendon Reflexes (DTR)] : deep tendon reflexes were 2+ and symmetric [Normal Affect] : the affect was normal [Normal Insight/Judgement] : insight and judgment were intact [de-identified] : telangiectasias

## 2025-01-11 NOTE — HISTORY OF PRESENT ILLNESS
[FreeTextEntry1] : follow up for crest. [de-identified] : son has ckd/esrd no further falls  moods ok.  vision ok saw ophtho  gerd stable taking ppi as needed hb normal p iron. 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 gets annual mammo/us 6m apart with tvus and renal us due again in spring.  lymphedema stable due to breast surgery and crest. avoids injury, lifting b/ arms.  CREST hx raynauds age 16, age 25 digital ulcer. rheum goldberg. due for ct chest. saw pulm, cardio. on RA diet avoids flour. processed foods which helps.. walks. recent pulm eval and cardio eval. utd pfts, tte, due for ct chest has scripts.  osteoporosis gets prolia due for dexa ophtho utd derm utd dental utd mammo utd nov along with renal and tvus and breast us  colo 6m ago pap this year vaccines utd flu declines shingrix, due for covid, tetanus at pharmacy.

## 2025-01-11 NOTE — HISTORY OF PRESENT ILLNESS
[FreeTextEntry1] : follow up for crest. [de-identified] : son has ckd/esrd no further falls  moods ok.  vision ok saw ophtho  gerd stable taking ppi as needed hb normal p iron. 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 gets annual mammo/us 6m apart with tvus and renal us due again in spring.  lymphedema stable due to breast surgery and crest. avoids injury, lifting b/ arms.  CREST hx raynauds age 16, age 25 digital ulcer. rheum goldberg. due for ct chest. saw pulm, cardio. on RA diet avoids flour. processed foods which helps.. walks. recent pulm eval and cardio eval. utd pfts, tte, due for ct chest has scripts.  osteoporosis gets prolia due for dexa ophtho utd derm utd dental utd mammo utd nov along with renal and tvus and breast us  colo 6m ago pap this year vaccines utd flu declines shingrix, due for covid, tetanus at pharmacy.

## 2025-01-13 ENCOUNTER — TRANSCRIPTION ENCOUNTER (OUTPATIENT)
Age: 75
End: 2025-01-13

## 2025-01-13 DIAGNOSIS — M34.1 CR(E)ST SYNDROME: ICD-10-CM

## 2025-01-13 LAB
ANION GAP SERPL CALC-SCNC: 14 MMOL/L
BUN SERPL-MCNC: 22 MG/DL
CALCIUM SERPL-MCNC: 10.3 MG/DL
CALCIUM SERPL-MCNC: 10.6 MG/DL
CHLORIDE SERPL-SCNC: 103 MMOL/L
CHOLEST SERPL-MCNC: 176 MG/DL
CO2 SERPL-SCNC: 21 MMOL/L
CREAT SERPL-MCNC: 1.12 MG/DL
EGFR: 52 ML/MIN/1.73M2
GLUCOSE SERPL-MCNC: 106 MG/DL
HDLC SERPL-MCNC: 66 MG/DL
LDLC SERPL CALC-MCNC: 96 MG/DL
NONHDLC SERPL-MCNC: 110 MG/DL
PARATHYROID HORMONE INTACT: 44 PG/ML
POTASSIUM SERPL-SCNC: 4.9 MMOL/L
SODIUM SERPL-SCNC: 138 MMOL/L
TRIGL SERPL-MCNC: 79 MG/DL
TSH SERPL-ACNC: 0.24 UIU/ML

## 2025-01-14 ENCOUNTER — NON-APPOINTMENT (OUTPATIENT)
Age: 75
End: 2025-01-14

## 2025-01-14 DIAGNOSIS — Z12.11 ENCOUNTER FOR SCREENING FOR MALIGNANT NEOPLASM OF COLON: ICD-10-CM

## 2025-01-14 DIAGNOSIS — Z12.39 ENCOUNTER FOR OTHER SCREENING FOR MALIGNANT NEOPLASM OF BREAST: ICD-10-CM

## 2025-01-14 DIAGNOSIS — Z13.31 ENCOUNTER FOR SCREENING FOR DEPRESSION: ICD-10-CM

## 2025-01-15 ENCOUNTER — NON-APPOINTMENT (OUTPATIENT)
Age: 75
End: 2025-01-15

## 2025-01-15 ENCOUNTER — APPOINTMENT (OUTPATIENT)
Dept: OBGYN | Facility: CLINIC | Age: 75
End: 2025-01-15
Payer: COMMERCIAL

## 2025-01-15 VITALS
BODY MASS INDEX: 27.88 KG/M2 | RESPIRATION RATE: 18 BRPM | WEIGHT: 142 LBS | HEART RATE: 73 BPM | OXYGEN SATURATION: 99 % | DIASTOLIC BLOOD PRESSURE: 72 MMHG | SYSTOLIC BLOOD PRESSURE: 130 MMHG | HEIGHT: 60 IN

## 2025-01-15 DIAGNOSIS — N95.2 POSTMENOPAUSAL ATROPHIC VAGINITIS: ICD-10-CM

## 2025-01-15 DIAGNOSIS — M81.0 AGE-RELATED OSTEOPOROSIS W/OUT CURRENT PATHOLOGICAL FRACTURE: ICD-10-CM

## 2025-01-15 DIAGNOSIS — Z12.39 ENCOUNTER FOR OTHER SCREENING FOR MALIGNANT NEOPLASM OF BREAST: ICD-10-CM

## 2025-01-15 DIAGNOSIS — Z86.000 PERSONAL HISTORY OF IN-SITU NEOPLASM OF BREAST: ICD-10-CM

## 2025-01-15 DIAGNOSIS — Z82.3 FAMILY HISTORY OF STROKE: ICD-10-CM

## 2025-01-15 DIAGNOSIS — Z85.3 PERSONAL HISTORY OF MALIGNANT NEOPLASM OF BREAST: ICD-10-CM

## 2025-01-15 DIAGNOSIS — Z92.29 PERSONAL HISTORY OF OTHER DRUG THERAPY: ICD-10-CM

## 2025-01-15 DIAGNOSIS — Z01.419 ENCOUNTER FOR GYNECOLOGICAL EXAMINATION (GENERAL) (ROUTINE) W/OUT ABNORMAL FINDINGS: ICD-10-CM

## 2025-01-15 LAB
BILIRUB UR QL STRIP: NORMAL
CLARITY UR: CLEAR
COLLECTION METHOD: NORMAL
GLUCOSE UR-MCNC: NORMAL
HCG UR QL: 0.2 EU/DL
HGB UR QL STRIP.AUTO: NORMAL
KETONES UR-MCNC: NORMAL
LEUKOCYTE ESTERASE UR QL STRIP: NORMAL
NITRITE UR QL STRIP: NORMAL
PH UR STRIP: 5.5
PROT UR STRIP-MCNC: NORMAL
SP GR UR STRIP: 1.01

## 2025-01-15 PROCEDURE — G0101: CPT

## 2025-01-15 PROCEDURE — 81003 URINALYSIS AUTO W/O SCOPE: CPT | Mod: QW

## 2025-01-15 PROCEDURE — G0444 DEPRESSION SCREEN ANNUAL: CPT

## 2025-01-15 RX ORDER — FERROUS SULFATE 15 MG/ML
DROPS ORAL
Refills: 0 | Status: ACTIVE | COMMUNITY

## 2025-01-15 NOTE — PLAN
[FreeTextEntry1] : 1)  Self breast exam instructions, mammography discussed with the patient. 2)  Lipid profile assessment, TSH screening, fasting glucose testing, and vitamin D supplementation were discussed with the patient. 3)  Maintain healthy weight. 4)  Regular health maintenance with PCP. 5)  Remain tobacco free. 6)  Limit alcohol intake to less than 5 drinks per week. 7)  Osteoporosis screening and colonoscopy screening. 8)  Annual cholesterol screening. 9)  Annual influenza vaccine. 10) The importance of routine physical activity was reviewed and a goal of 150 minutes of moderately vigorous exercise per week was endorsed.   Yearly breast cancer screening with no current clinical or radiographic concerns. The patient was reminded regarding future well breast and general healthcare, breast cancer risk reduction, the importance of self-examination and the need for follow up. She was again reminded of the need to take Vit D3 2500 IU daily or to keep a Vit D level above 30, and Turmeric 1000 mg daily with Black Pepper. Plan continued yearly imaging and breast follow up, sooner as needed. I counseled the patient on current recommendations to reduce breast cancer risk including but not inclusive to regular exercise 20-30 minutes 3-4 times a week, low fat diet, limiting alcohol consumption, maintenance of ideal body weight, yearly imaging and self-breast awareness. Questions answered. I encouraged in light of COVID-19, social distancing, frequent hand washing and precautions to stay healthy.  Patient is aware of vaginal atrophy and dryness. This can cause increased vaginal discomfort, in the form of burning and irritation in the vagina and discomfort with penetration. Vaginal atrophy can also be the cause of increased vaginal discharge and feeling of urgency and frequency of urine. Patient was advised to  a vaginal moisturizer, in the form of Vitamin E suppositories, Luvena, or Revaree, all nonhormonal and over the counter, and to use it on a regular basis, 2-3x per week if dryness becomes bothersome.   I have spent 40 minutes of time on this encounter. Greater than 50% of the face-to-face encounter time was spent on counseling and/or coordination of care for examination findings, differential, testing, management and planning. 10 minutes have been allotted to discuss the depression screening.

## 2025-01-15 NOTE — PHYSICAL EXAM
[Chaperone Present] : A chaperone was present in the examining room during all aspects of the physical examination [[id number]] : A chaperone was present during the pelvic exam. [Appropriately responsive] : appropriately responsive [Alert] : alert [No Acute Distress] : no acute distress [No Lymphadenopathy] : no lymphadenopathy [Soft] : soft [Non-tender] : non-tender [Non-distended] : non-distended [No HSM] : No HSM [No Lesions] : no lesions [No Mass] : no mass [Oriented x3] : oriented x3 [No Masses] : no breast masses were palpable [Labia Majora] : normal [Labia Minora] : normal [Atrophy] : atrophy [Dry Mucosa] : dry mucosa [Normal] : normal [Uterine Adnexae] : normal [FreeTextEntry2] : Coni Beal [FreeTextEntry3] : This note was written by Coni Beal on 01/15/2025, acting as a scribe for Dr. Jordan Mcgovern MD. All medic record entries were at my, Dr. Jodran Mcgovern MD, direction and personally dictated by me in 01/15/2025. I have personally reviewed the chart and agree that the record accurately reflects my personal performance of the history, physical exam, assessment, and plan. [FreeTextEntry6] : radiation changes [Lt > Rt] : the left breast was larger than the right [The Left Breast Was Examined] : a normal appearance [FreeTextEntry9] : no rectal - recent colonoscopy [TextEntry] : The patient's right breast periareolar had evidence of lumpectomy and radiation changes.

## 2025-01-15 NOTE — HISTORY OF PRESENT ILLNESS
[Y] : Positive pregnancy history [LMP unknown] : LMP unknown [Yes] : pregnancy [Post-Menopause, No Sxs] : post-menopausal, currently without symptoms [No] : Patient does not have concerns regarding sex [Currently Active] : currently active [Men] : men [postmenopausal] : postmenopausal [TextBox_4] : The 74-year-old patient presents today for a routine GYN exam. Patient is doing well. She offers no complaints. We reviewed together in detail her past medical and surgical histories, allergies and medication usage, social and family history. All questions were answered in easy-to-understand language. [Mammogramdate] : 5/10/2024 [BreastSonogramDate] : 5/10/2024 [PapSmeardate] : 2021 [BoneDensityDate] : 9/15/2022 [ColonoscopyDate] : 3/2024 [PGHxTotal] : 2 [[facility]] : 2 [[facility] Centeriving] : 3 [PGHxMultBirths] : 1

## 2025-01-22 ENCOUNTER — RX RENEWAL (OUTPATIENT)
Age: 75
End: 2025-01-22

## 2025-01-23 ENCOUNTER — TRANSCRIPTION ENCOUNTER (OUTPATIENT)
Age: 75
End: 2025-01-23

## 2025-02-05 ENCOUNTER — NON-APPOINTMENT (OUTPATIENT)
Age: 75
End: 2025-02-05

## 2025-02-18 DIAGNOSIS — M81.0 AGE-RELATED OSTEOPOROSIS W/OUT CURRENT PATHOLOGICAL FRACTURE: ICD-10-CM

## 2025-02-19 ENCOUNTER — APPOINTMENT (OUTPATIENT)
Dept: INTERNAL MEDICINE | Facility: CLINIC | Age: 75
End: 2025-02-19
Payer: COMMERCIAL

## 2025-02-19 PROCEDURE — 96401 CHEMO ANTI-NEOPL SQ/IM: CPT

## 2025-02-19 RX ORDER — DENOSUMAB 60 MG/ML
60 INJECTION SUBCUTANEOUS
Qty: 1 | Refills: 0 | Status: COMPLETED | OUTPATIENT
Start: 2025-02-18

## 2025-03-31 ENCOUNTER — APPOINTMENT (OUTPATIENT)
Dept: CT IMAGING | Facility: CLINIC | Age: 75
End: 2025-03-31
Payer: COMMERCIAL

## 2025-03-31 ENCOUNTER — OUTPATIENT (OUTPATIENT)
Dept: OUTPATIENT SERVICES | Facility: HOSPITAL | Age: 75
LOS: 1 days | End: 2025-03-31
Payer: COMMERCIAL

## 2025-03-31 ENCOUNTER — APPOINTMENT (OUTPATIENT)
Dept: RADIOLOGY | Facility: CLINIC | Age: 75
End: 2025-03-31
Payer: COMMERCIAL

## 2025-03-31 DIAGNOSIS — Z92.3 PERSONAL HISTORY OF IRRADIATION: Chronic | ICD-10-CM

## 2025-03-31 DIAGNOSIS — Z96.659 PRESENCE OF UNSPECIFIED ARTIFICIAL KNEE JOINT: Chronic | ICD-10-CM

## 2025-03-31 DIAGNOSIS — Z98.890 OTHER SPECIFIED POSTPROCEDURAL STATES: Chronic | ICD-10-CM

## 2025-03-31 DIAGNOSIS — Z00.8 ENCOUNTER FOR OTHER GENERAL EXAMINATION: ICD-10-CM

## 2025-03-31 PROCEDURE — 71250 CT THORAX DX C-: CPT | Mod: 26

## 2025-03-31 PROCEDURE — 77080 DXA BONE DENSITY AXIAL: CPT | Mod: 26

## 2025-03-31 PROCEDURE — 77080 DXA BONE DENSITY AXIAL: CPT

## 2025-03-31 PROCEDURE — 71250 CT THORAX DX C-: CPT

## 2025-04-03 ENCOUNTER — NON-APPOINTMENT (OUTPATIENT)
Age: 75
End: 2025-04-03

## 2025-04-03 ENCOUNTER — TRANSCRIPTION ENCOUNTER (OUTPATIENT)
Age: 75
End: 2025-04-03

## 2025-04-04 ENCOUNTER — TRANSCRIPTION ENCOUNTER (OUTPATIENT)
Age: 75
End: 2025-04-04

## 2025-04-22 ENCOUNTER — NON-APPOINTMENT (OUTPATIENT)
Age: 75
End: 2025-04-22

## 2025-05-15 ENCOUNTER — NON-APPOINTMENT (OUTPATIENT)
Age: 75
End: 2025-05-15

## 2025-05-31 ENCOUNTER — NON-APPOINTMENT (OUTPATIENT)
Age: 75
End: 2025-05-31

## 2025-06-03 ENCOUNTER — APPOINTMENT (OUTPATIENT)
Dept: CARDIOLOGY | Facility: CLINIC | Age: 75
End: 2025-06-03

## 2025-06-11 ENCOUNTER — RX RENEWAL (OUTPATIENT)
Age: 75
End: 2025-06-11

## 2025-06-23 ENCOUNTER — RX RENEWAL (OUTPATIENT)
Age: 75
End: 2025-06-23

## 2025-07-07 ENCOUNTER — NON-APPOINTMENT (OUTPATIENT)
Age: 75
End: 2025-07-07

## 2025-07-09 ENCOUNTER — NON-APPOINTMENT (OUTPATIENT)
Age: 75
End: 2025-07-09

## 2025-07-11 ENCOUNTER — APPOINTMENT (OUTPATIENT)
Dept: INTERNAL MEDICINE | Facility: CLINIC | Age: 75
End: 2025-07-11
Payer: COMMERCIAL

## 2025-07-11 VITALS
BODY MASS INDEX: 27.48 KG/M2 | HEART RATE: 79 BPM | TEMPERATURE: 98 F | SYSTOLIC BLOOD PRESSURE: 107 MMHG | HEIGHT: 60 IN | DIASTOLIC BLOOD PRESSURE: 71 MMHG | OXYGEN SATURATION: 98 % | WEIGHT: 140 LBS

## 2025-07-11 PROBLEM — B37.2 CANDIDAL INTERTRIGO: Status: ACTIVE | Noted: 2025-07-11

## 2025-07-11 PROCEDURE — 99214 OFFICE O/P EST MOD 30 MIN: CPT | Mod: 25

## 2025-07-11 PROCEDURE — 36415 COLL VENOUS BLD VENIPUNCTURE: CPT

## 2025-07-11 PROCEDURE — 99397 PER PM REEVAL EST PAT 65+ YR: CPT

## 2025-07-11 RX ORDER — NYSTATIN 100000 [USP'U]/G
100000 POWDER TOPICAL 3 TIMES DAILY
Qty: 1 | Refills: 1 | Status: ACTIVE | COMMUNITY
Start: 2025-07-11 | End: 1900-01-01

## 2025-07-11 NOTE — HISTORY OF PRESENT ILLNESS
[FreeTextEntry1] : cpe [de-identified] : Got sick with covid after vacation saturday. drip then a cough and head cold. dx covid and got mab x 3. feeling much better today.  crest had echo, ct chest, pfts were normal sees rheum  colo/egd was normal taking omeprazole bid taking dilofenac for arthralgias bid worst in hands and R knee osteoporosis taking prolia hypothryoid taking 100mcg oab taking myrbetriq 50mg hm utd tvus, pap, mammo, colo, dexa. due for shingrix. did rsv pneumonia. utd ophtho and dental sees urologist.  ciclopirox helping toes also has rash in groin crease needed 2 root canals this year

## 2025-07-11 NOTE — HEALTH RISK ASSESSMENT
[Good] : ~his/her~  mood as  good [0] : 2) Feeling down, depressed, or hopeless: Not at all (0) [PHQ-2 Negative - No further assessment needed] : PHQ-2 Negative - No further assessment needed [Never] : Never [Fully functional (bathing, dressing, toileting, transferring, walking, feeding)] : Fully functional (bathing, dressing, toileting, transferring, walking, feeding) [Fully functional (using the telephone, shopping, preparing meals, housekeeping, doing laundry, using] : Fully functional and needs no help or supervision to perform IADLs (using the telephone, shopping, preparing meals, housekeeping, doing laundry, using transportation, managing medications and managing finances) [With Patient/Caregiver] : , with patient/caregiver [Reviewed no changes] : Reviewed, no changes [Designated Healthcare Proxy] : Designated healthcare proxy [[id number]] : 0 [AdvancecareDate] : 07/25

## 2025-07-11 NOTE — ASSESSMENT
[Vaccines Reviewed] : Immunizations reviewed today. Please see immunization details in the vaccine log within the immunization flowsheet.  [FreeTextEntry1] : covid improving. cont sx meds.  crest utd echo, ct chest, pfts cont fu rheum  utd colo/egd  taking omeprazole bid cont dilofenac for arthralgias prn  osteoporosis cont prolia check calcium hypothryoid cont 100mcg check tsh oab taking myrbetriq 50mg cont fu urologist for cysto hm utd tvus, pap, mammo, colo, dexa. due for shingrix. did rsv pneumonia. utd ophtho and dental cont ciclopirox  nystatin tp for candida cont dental fu check iga

## 2025-07-14 LAB
ALBUMIN SERPL ELPH-MCNC: 4.1 G/DL
ALP BLD-CCNC: 53 U/L
ALT SERPL-CCNC: 17 U/L
ANION GAP SERPL CALC-SCNC: 15 MMOL/L
AST SERPL-CCNC: 19 U/L
BILIRUB SERPL-MCNC: 0.4 MG/DL
BUN SERPL-MCNC: 13 MG/DL
CALCIUM SERPL-MCNC: 9.2 MG/DL
CHLORIDE SERPL-SCNC: 111 MMOL/L
CHOLEST SERPL-MCNC: 121 MG/DL
CO2 SERPL-SCNC: 19 MMOL/L
CREAT SERPL-MCNC: 1.04 MG/DL
EGFRCR SERPLBLD CKD-EPI 2021: 56 ML/MIN/1.73M2
ESTIMATED AVERAGE GLUCOSE: 105 MG/DL
GLUCOSE SERPL-MCNC: 90 MG/DL
HBA1C MFR BLD HPLC: 5.3 %
HCT VFR BLD CALC: 37 %
HDLC SERPL-MCNC: 41 MG/DL
HGB BLD-MCNC: 11 G/DL
LDLC SERPL-MCNC: 59 MG/DL
MCHC RBC-ENTMCNC: 28.1 PG
MCHC RBC-ENTMCNC: 29.7 G/DL
MCV RBC AUTO: 94.4 FL
NONHDLC SERPL-MCNC: 81 MG/DL
PLATELET # BLD AUTO: 243 K/UL
POTASSIUM SERPL-SCNC: 4.5 MMOL/L
PROT SERPL-MCNC: 6.2 G/DL
RBC # BLD: 3.92 M/UL
RBC # FLD: 15.8 %
SODIUM SERPL-SCNC: 145 MMOL/L
TRIGL SERPL-MCNC: 123 MG/DL
TSH SERPL-ACNC: 1.09 UIU/ML
VIT B12 SERPL-MCNC: >2000 PG/ML
WBC # FLD AUTO: 7.7 K/UL

## 2025-07-15 LAB
DEPRECATED KAPPA LC FREE/LAMBDA SER: 1.15 RATIO
IGA SERPL-MCNC: 54 MG/DL
IGA SERPL-MCNC: 54 MG/DL
IGG SERPL-MCNC: 618 MG/DL
IGM SERPL-MCNC: 53 MG/DL
KAPPA LC CSF-MCNC: 1.57 MG/DL
KAPPA LC SERPL-MCNC: 1.81 MG/DL

## 2025-08-14 ENCOUNTER — APPOINTMENT (OUTPATIENT)
Dept: ALLERGY | Facility: CLINIC | Age: 75
End: 2025-08-14
Payer: COMMERCIAL

## 2025-08-14 ENCOUNTER — NON-APPOINTMENT (OUTPATIENT)
Age: 75
End: 2025-08-14

## 2025-08-14 PROCEDURE — 99204 OFFICE O/P NEW MOD 45 MIN: CPT

## 2025-08-18 VITALS
SYSTOLIC BLOOD PRESSURE: 130 MMHG | HEART RATE: 78 BPM | DIASTOLIC BLOOD PRESSURE: 78 MMHG | TEMPERATURE: 98.7 F | OXYGEN SATURATION: 95 %

## 2025-08-23 ENCOUNTER — NON-APPOINTMENT (OUTPATIENT)
Age: 75
End: 2025-08-23

## 2025-08-25 ENCOUNTER — APPOINTMENT (OUTPATIENT)
Dept: INTERNAL MEDICINE | Facility: CLINIC | Age: 75
End: 2025-08-25
Payer: COMMERCIAL

## 2025-08-25 PROCEDURE — 96401 CHEMO ANTI-NEOPL SQ/IM: CPT

## 2025-08-25 RX ORDER — DENOSUMAB 60 MG/ML
60 INJECTION SUBCUTANEOUS
Qty: 1 | Refills: 0 | Status: COMPLETED | OUTPATIENT
Start: 2025-08-25

## 2025-08-25 RX ADMIN — DENOSUMAB 0 MG/ML: 60 INJECTION SUBCUTANEOUS at 00:00

## 2025-08-27 ENCOUNTER — APPOINTMENT (OUTPATIENT)
Dept: CT IMAGING | Facility: CLINIC | Age: 75
End: 2025-08-27

## 2025-09-02 ENCOUNTER — TRANSCRIPTION ENCOUNTER (OUTPATIENT)
Age: 75
End: 2025-09-02

## (undated) DEVICE — GOWN TRIMAX LG

## (undated) DEVICE — GLV 8 PROTEXIS (WHITE)

## (undated) DEVICE — TROCAR COVIDIEN BLUNT TIP HASSAN 10MM

## (undated) DEVICE — BLADE SCALPEL SAFETYLOCK #10

## (undated) DEVICE — SPECIMEN CONTAINER 100ML

## (undated) DEVICE — GLV 7.5 PROTEXIS (WHITE)

## (undated) DEVICE — DRAPE TOWEL BLUE 17" X 24"

## (undated) DEVICE — LIGASURE IMPACT

## (undated) DEVICE — BLADE SCALPEL SAFETYLOCK #15

## (undated) DEVICE — LIGASURE MARYLAND 37CM

## (undated) DEVICE — DISSECTOR ENDO PEANUT 5MM

## (undated) DEVICE — INSUFFLATION NDL COVIDIEN STEP 14G FOR STEP/VERSASTEP

## (undated) DEVICE — SUT BIOSYN 4-0 18" P-12

## (undated) DEVICE — TROCAR ETHICON ENDOPATH XCEL BLADELESS 5MM X 100MM STABILITY

## (undated) DEVICE — ENDOCATCH 10MM SPECIMEN POUCH

## (undated) DEVICE — SOL IRR POUR NS 0.9% 500ML

## (undated) DEVICE — SOL IRR POUR H2O 250ML

## (undated) DEVICE — LIGASURE BLUNT TIP 37CM

## (undated) DEVICE — GLV 8.5 PROTEXIS (WHITE)

## (undated) DEVICE — VALVE YELLOW PORT SEAL PLUS 5MM

## (undated) DEVICE — TROCAR COVIDIEN BLUNT TIP HASSAN 12MM STANDARD

## (undated) DEVICE — TROCAR COVIDIEN VERSASTEP 5MM SHORT

## (undated) DEVICE — TUBING IRRIGATION DAVOL SYSTEM X STREAM

## (undated) DEVICE — TROCAR COVIDIEN VERSAPORT BLADELESS OPTICAL 5MM STANDARD

## (undated) DEVICE — TROCAR APPLIED MEDICAL KII BALLOON BLUNT TIP 12MM X 100MM

## (undated) DEVICE — TROCAR COVIDIEN VERSASTEP PLUS 11MM STANDARD

## (undated) DEVICE — GLV 7 PROTEXIS (WHITE)

## (undated) DEVICE — PACK LAP CHOLE LAP APPENDECTOMY

## (undated) DEVICE — DRSG TEGADERM 6"X8"

## (undated) DEVICE — VENODYNE/SCD SLEEVE CALF LARGE

## (undated) DEVICE — GLV 6.5 PROTEXIS (WHITE)

## (undated) DEVICE — TROCAR COVIDIEN BLUNT TIP HASSAN 12MM

## (undated) DEVICE — D HELP - CLEARVIEW CLEARIFY SYSTEM

## (undated) DEVICE — TUBING INSUFFLATION LAP FILTER 10FT

## (undated) DEVICE — TROCAR COVIDIEN STEP 5MM SHORT 70MM

## (undated) DEVICE — DRAPE GENERAL ENDOSCOPY

## (undated) DEVICE — PREP CHLORAPREP HI-LITE ORANGE 26ML

## (undated) DEVICE — TROCAR COVIDIEN VERSASTEP PLUS 12MM STANDARD

## (undated) DEVICE — INSUFFLATION NDL COVIDIEN STEP 14G SHORT FOR STEP/VERSASTEP

## (undated) DEVICE — SUT POLYSORB 0 30" GS-23

## (undated) DEVICE — MEDICATION LABELS W MARKER

## (undated) DEVICE — DRAPE INSTRUMENT POUCH 6.75" X 11"

## (undated) DEVICE — STAPLER COVIDIEN ENDO GIA STANDARD HANDLE

## (undated) DEVICE — DRSG STERISTRIPS 0.5 X 4"

## (undated) DEVICE — WARMING BLANKET UPPER ADULT

## (undated) DEVICE — DRSG OPSITE 2.5 X 2"

## (undated) DEVICE — TROCAR APPLIED MEDICAL KII BALLOON BLUNT TIP 12MM X 130MM

## (undated) DEVICE — POSITIONER FOAM EGG CRATE ULNAR 2PCS (PINK)